# Patient Record
Sex: MALE | Race: WHITE | NOT HISPANIC OR LATINO | Employment: FULL TIME | URBAN - METROPOLITAN AREA
[De-identification: names, ages, dates, MRNs, and addresses within clinical notes are randomized per-mention and may not be internally consistent; named-entity substitution may affect disease eponyms.]

---

## 2023-08-19 ENCOUNTER — HOSPITAL ENCOUNTER (OUTPATIENT)
Dept: DATA CONVERSION | Facility: HOSPITAL | Age: 48
Discharge: HOME | End: 2023-08-19
Payer: COMMERCIAL

## 2023-08-19 DIAGNOSIS — I10 ESSENTIAL (PRIMARY) HYPERTENSION: ICD-10-CM

## 2023-08-19 LAB
ANION GAP SERPL CALCULATED.3IONS-SCNC: 10 MMOL/L (ref 0–19)
BUN SERPL-MCNC: 19 MG/DL (ref 8–25)
BUN/CREAT SERPL: 19 RATIO (ref 8–21)
CALCIUM SERPL-MCNC: 9.3 MG/DL (ref 8.5–10.4)
CHLORIDE SERPL-SCNC: 107 MMOL/L (ref 97–107)
CO2 SERPL-SCNC: 25 MMOL/L (ref 24–31)
CREAT SERPL-MCNC: 1 MG/DL (ref 0.4–1.6)
GFR SERPL CREATININE-BSD FRML MDRD: 93 ML/MIN/1.73 M2
GLUCOSE SERPL-MCNC: 137 MG/DL (ref 65–99)
POTASSIUM SERPL-SCNC: 5.7 MMOL/L (ref 3.4–5.1)
SODIUM SERPL-SCNC: 142 MMOL/L (ref 133–145)

## 2023-10-06 PROCEDURE — 83036 HEMOGLOBIN GLYCOSYLATED A1C: CPT

## 2023-10-06 PROCEDURE — 84132 ASSAY OF SERUM POTASSIUM: CPT

## 2023-10-11 ENCOUNTER — TELEPHONE (OUTPATIENT)
Dept: PRIMARY CARE | Facility: CLINIC | Age: 48
End: 2023-10-11
Payer: COMMERCIAL

## 2023-10-12 NOTE — TELEPHONE ENCOUNTER
Called and informed patient of message, he is aware and understands. He will call back to make CPE appt. In Feb.

## 2023-10-31 ENCOUNTER — PHARMACY VISIT (OUTPATIENT)
Dept: PHARMACY | Facility: CLINIC | Age: 48
End: 2023-10-31
Payer: COMMERCIAL

## 2023-11-02 ENCOUNTER — PHARMACY VISIT (OUTPATIENT)
Dept: PHARMACY | Facility: CLINIC | Age: 48
End: 2023-11-02
Payer: COMMERCIAL

## 2023-11-02 PROCEDURE — RXMED WILLOW AMBULATORY MEDICATION CHARGE

## 2023-11-08 ENCOUNTER — TELEPHONE (OUTPATIENT)
Dept: PRIMARY CARE | Facility: CLINIC | Age: 48
End: 2023-11-08
Payer: COMMERCIAL

## 2023-11-08 DIAGNOSIS — I10 HYPERTENSION, UNSPECIFIED TYPE: ICD-10-CM

## 2023-11-08 DIAGNOSIS — E78.00 PURE HYPERCHOLESTEROLEMIA: ICD-10-CM

## 2023-11-08 DIAGNOSIS — Z00.00 ROUTINE MEDICAL EXAM: ICD-10-CM

## 2023-12-06 ENCOUNTER — OFFICE VISIT (OUTPATIENT)
Dept: PRIMARY CARE | Facility: CLINIC | Age: 48
End: 2023-12-06
Payer: COMMERCIAL

## 2023-12-06 VITALS
TEMPERATURE: 98.5 F | WEIGHT: 187 LBS | OXYGEN SATURATION: 95 % | BODY MASS INDEX: 26.77 KG/M2 | HEIGHT: 70 IN | HEART RATE: 83 BPM | DIASTOLIC BLOOD PRESSURE: 72 MMHG | SYSTOLIC BLOOD PRESSURE: 112 MMHG

## 2023-12-06 DIAGNOSIS — F33.1 MODERATE EPISODE OF RECURRENT MAJOR DEPRESSIVE DISORDER (MULTI): ICD-10-CM

## 2023-12-06 DIAGNOSIS — F41.9 ANXIETY: ICD-10-CM

## 2023-12-06 DIAGNOSIS — I10 HYPERTENSION, UNSPECIFIED TYPE: ICD-10-CM

## 2023-12-06 DIAGNOSIS — Z12.11 COLON CANCER SCREENING: ICD-10-CM

## 2023-12-06 DIAGNOSIS — Z00.00 WELL ADULT EXAM: Primary | ICD-10-CM

## 2023-12-06 DIAGNOSIS — Z72.0 TOBACCO USE: ICD-10-CM

## 2023-12-06 PROBLEM — M54.12 CERVICAL RADICULOPATHY: Status: ACTIVE | Noted: 2023-12-06

## 2023-12-06 PROBLEM — F32.A DEPRESSION: Status: ACTIVE | Noted: 2022-09-12

## 2023-12-06 PROBLEM — R73.9 HYPERGLYCEMIA: Status: ACTIVE | Noted: 2023-12-06

## 2023-12-06 PROCEDURE — 99212 OFFICE O/P EST SF 10 MIN: CPT | Performed by: NURSE PRACTITIONER

## 2023-12-06 PROCEDURE — 1036F TOBACCO NON-USER: CPT | Performed by: NURSE PRACTITIONER

## 2023-12-06 PROCEDURE — 3074F SYST BP LT 130 MM HG: CPT | Performed by: NURSE PRACTITIONER

## 2023-12-06 PROCEDURE — 90686 IIV4 VACC NO PRSV 0.5 ML IM: CPT | Performed by: NURSE PRACTITIONER

## 2023-12-06 PROCEDURE — 3078F DIAST BP <80 MM HG: CPT | Performed by: NURSE PRACTITIONER

## 2023-12-06 PROCEDURE — 99396 PREV VISIT EST AGE 40-64: CPT | Performed by: NURSE PRACTITIONER

## 2023-12-06 PROCEDURE — 90471 IMMUNIZATION ADMIN: CPT | Performed by: NURSE PRACTITIONER

## 2023-12-06 PROCEDURE — 93000 ELECTROCARDIOGRAM COMPLETE: CPT | Performed by: NURSE PRACTITIONER

## 2023-12-06 RX ORDER — VENLAFAXINE HYDROCHLORIDE 37.5 MG/1
37.5 CAPSULE, EXTENDED RELEASE ORAL DAILY
Qty: 30 CAPSULE | Refills: 1 | Status: SHIPPED | OUTPATIENT
Start: 2023-12-06 | End: 2024-01-02 | Stop reason: DRUGHIGH

## 2023-12-06 RX ORDER — LORAZEPAM 0.5 MG/1
0.5 TABLET ORAL 2 TIMES DAILY PRN
Qty: 6 TABLET | Refills: 0 | Status: SHIPPED | OUTPATIENT
Start: 2023-12-06 | End: 2023-12-09

## 2023-12-06 ASSESSMENT — PATIENT HEALTH QUESTIONNAIRE - PHQ9
3. TROUBLE FALLING OR STAYING ASLEEP OR SLEEPING TOO MUCH: NEARLY EVERY DAY
SUM OF ALL RESPONSES TO PHQ QUESTIONS 1-9: 24
2. FEELING DOWN, DEPRESSED OR HOPELESS: NEARLY EVERY DAY
SUM OF ALL RESPONSES TO PHQ9 QUESTIONS 1 AND 2: 6
8. MOVING OR SPEAKING SO SLOWLY THAT OTHER PEOPLE COULD HAVE NOTICED. OR THE OPPOSITE, BEING SO FIGETY OR RESTLESS THAT YOU HAVE BEEN MOVING AROUND A LOT MORE THAN USUAL: NEARLY EVERY DAY
5. POOR APPETITE OR OVEREATING: NEARLY EVERY DAY
9. THOUGHTS THAT YOU WOULD BE BETTER OFF DEAD, OR OF HURTING YOURSELF: NOT AT ALL
6. FEELING BAD ABOUT YOURSELF - OR THAT YOU ARE A FAILURE OR HAVE LET YOURSELF OR YOUR FAMILY DOWN: NEARLY EVERY DAY
7. TROUBLE CONCENTRATING ON THINGS, SUCH AS READING THE NEWSPAPER OR WATCHING TELEVISION: NEARLY EVERY DAY
10. IF YOU CHECKED OFF ANY PROBLEMS, HOW DIFFICULT HAVE THESE PROBLEMS MADE IT FOR YOU TO DO YOUR WORK, TAKE CARE OF THINGS AT HOME, OR GET ALONG WITH OTHER PEOPLE: EXTREMELY DIFFICULT
1. LITTLE INTEREST OR PLEASURE IN DOING THINGS: NEARLY EVERY DAY
4. FEELING TIRED OR HAVING LITTLE ENERGY: NEARLY EVERY DAY

## 2023-12-06 ASSESSMENT — PAIN SCALES - GENERAL: PAINLEVEL: 0-NO PAIN

## 2023-12-06 NOTE — PATIENT INSTRUCTIONS
Thank you for choosing our office for your healthcare needs    Please work towards healthy lifestyle including [employer] diet and regular exercise  Smoking cessation encouraged    Restart venlafaxine 37.5mg daily  Plan to follow-up in the office in 6 to 8 weeks

## 2023-12-06 NOTE — PROGRESS NOTES
"Subjective   Patient ID: Roman Ellis is a 48 y.o. male who presents for Annual Exam (Concerns about anxiety medication).    HPI   Diet \"crappy\" past few weeks  Exercise - has [employer] membership  + smoking - varies 1/2 - 1ppd     Colonoscopy - never  No colon cancer family history    Hx  of htn  Tolerating medications   Does not check blood pressure at home    Pt quit venlafaxine - stopped when rx ran out 5 month ago. He is feeling depressed.  Pt and wife state he is not himself. Pt is crabby, irritable, anhedonia. No SI/HI.   Pt was on something previously that had sexual side effects. He is interested in restarting     No predraw blood work    Review of Systems  Constitutional Symptoms: negative for fever, loss of appetite, headaches, fatigue.   Eyes: negative for loss and blurring of vision, double vision.   Ear, Nose, Mouth, Throat: negative for hearing loss, tinnitus, nasal congestion, rhinorrhea, nose bleeds, teeth problems, mouth sores, gum disease, dysphagia, sore throat.   Cardiovascular: negative for chest pain/pressure, palpitations, edema, claudication.   Respiratory: negative for shortness of breath, dyspnea on exertion, pain with breathing, coughing.   Breast: negative for tenderness, masses, gynecomastia.   Gastrointestinal: negative for anorexia, indigestion, nausea, vomiting, abdominal pain, change in bowel habits, diarrhea, constipation, hematochaezia, melena, blood in stool.    : Negative for urinary or penile complaints  Musculoskeletal: negative for joint pain, joint swelling, myalgias, cramps.   Integumentary: negative for change in mole, skin trouble or rash.   Neurological: negative for headache, numbness, tingling, weakness, tremors.   Psychiatric: Positive for depression, anxiety. Irritability, Anhedonia.  Negative for SI/HI  Endocrine: negative for weight gain, heat or cold intolerance, polyuria, polydipsia, polyphagia.   Hematologic/Lymphatic: negative for bruising, abnormal bleeding, " "swollen glands.      Objective   /72 (BP Location: Left arm)   Pulse 83   Temp 36.9 °C (98.5 °F) (Temporal)   Ht 1.778 m (5' 10\")   Wt 84.8 kg (187 lb)   SpO2 95%   BMI 26.83 kg/m²     Physical Exam  General Appearance: Vital Signs have been reviewed. Comfortable. Well nourished, and well developed. He is awake, alert, and oriented and appears his stated age. The patient is cooperative with exam.  Head: Hair pattern reveals a normal pattern for patient's age and The face shows no abnormalities.  Eyes: PERRLA, EOMI, conjunctiva and sclera clear. Extraocular muscle exam reveals EOMI.  Ears, Nose, Mouth, and Throat: EARS: External bilateral ears reveals normal helix, tragus and ear lobe. Bilateral canals are normal. Both tympanic membranes are pearly gray and landmarks normal.   NOSE: Nasal mucosa in both nostrils reveals no polyps, ulcerations, or lesions. Teeth reveal good repair. Posterior pharynx reveals no abnormalities.  Neck: Neck reveals supple, no adenopathy, no thyromegaly, or carotid bruits.  Chest: Lungs are clear to auscultation bilaterally with no wheezes, rales, or rhonchi.  Cardiovascular: RRR without MRG. Bilateral DP pulses are 2+. Extremities reveal no cyanosis, clubbing, or edema.  Abdomen: Abdomen is soft, NT, ND with no masses.   Genitourinary: Deferred.  No complaints  Musculoskeletal: 5/5 and equal strength in bilateral upper and lower extremities.  Skin: Skin reveals good turgor and no rashes.  Neurological: Neuro: Intact and non-focal.  Psychiatric: Patient has appropriate judgement. Patient has good insight. Mood and affect depressed    Assessment/Plan   Diagnoses and all orders for this visit:  Well adult exam  -     ECG 12 lead (Clinic Performed)  Continue current medications  Healthy diet and exercise  Mediterranean diet  Safety  Plan 6-month interval visit  Hypertension, unspecified type  -     ECG 12 lead (Clinic Performed)  See above  Anxiety  -     venlafaxine XR " (Effexor-XR) 37.5 mg 24 hr capsule; Take 1 capsule (37.5 mg) by mouth once daily. Do not crush or chew.  Restart venlafaxine XR as prescribed  Relaxation/mindfulness  Calm brittany  Consider therapy  If not noting improvement in about 3 weeks or so, we can increase medicine to 75 mg and have follow-up  Plan to follow-up in 6 to 8 weeks    Oarrs reviewed  3 day supply ativan given to use while restarting venlafaxine. Understands if he needs more, he will need to see psych or have further evaluation.   Moderate episode of recurrent major depressive disorder (CMS/HCC)  -     venlafaxine XR (Effexor-XR) 37.5 mg 24 hr capsule; Take 1 capsule (37.5 mg) by mouth once daily. Do not crush or chew.  See anxiety tab above  Colon cancer screening  -     Cologuard® colon cancer screening; Future  Refuses colonoscopy.  Agrees to Cologuard  Tobacco use  Smoking cessation encouraged.  Not ready to quit  Other orders  -     Flu vaccine (IIV4) age 6 months and greater, preservative free  Indications, benefits and risks/side effects discussed  -     Follow Up In Primary Care - Established; Future

## 2023-12-29 LAB — NONINV COLON CA DNA+OCC BLD SCRN STL QL: NEGATIVE

## 2024-01-02 ENCOUNTER — TELEPHONE (OUTPATIENT)
Dept: PRIMARY CARE | Facility: CLINIC | Age: 49
End: 2024-01-02
Payer: COMMERCIAL

## 2024-01-02 DIAGNOSIS — F33.1 MODERATE EPISODE OF RECURRENT MAJOR DEPRESSIVE DISORDER (MULTI): ICD-10-CM

## 2024-01-02 DIAGNOSIS — F41.9 ANXIETY: Primary | ICD-10-CM

## 2024-01-02 DIAGNOSIS — F41.9 ANXIETY: ICD-10-CM

## 2024-01-02 PROCEDURE — RXMED WILLOW AMBULATORY MEDICATION CHARGE

## 2024-01-02 RX ORDER — VENLAFAXINE HYDROCHLORIDE 75 MG/1
75 CAPSULE, EXTENDED RELEASE ORAL DAILY
Qty: 90 CAPSULE | Refills: 1 | Status: SHIPPED | OUTPATIENT
Start: 2024-01-02 | End: 2024-04-09 | Stop reason: SDUPTHER

## 2024-01-02 RX ORDER — VENLAFAXINE HYDROCHLORIDE 75 MG/1
75 CAPSULE, EXTENDED RELEASE ORAL DAILY
Qty: 30 CAPSULE | Refills: 5 | Status: SHIPPED | OUTPATIENT
Start: 2024-01-02 | End: 2024-01-02 | Stop reason: SDUPTHER

## 2024-01-02 NOTE — TELEPHONE ENCOUNTER
Called and informed wife of this, she is aware. She is requesting 90 days to be sent to [facility]. Pended for K[employer] to send.

## 2024-01-04 ENCOUNTER — PHARMACY VISIT (OUTPATIENT)
Dept: PHARMACY | Facility: CLINIC | Age: 49
End: 2024-01-04
Payer: COMMERCIAL

## 2024-02-07 PROCEDURE — RXMED WILLOW AMBULATORY MEDICATION CHARGE

## 2024-02-08 ENCOUNTER — PHARMACY VISIT (OUTPATIENT)
Dept: PHARMACY | Facility: CLINIC | Age: 49
End: 2024-02-08
Payer: COMMERCIAL

## 2024-03-27 ENCOUNTER — PHARMACY VISIT (OUTPATIENT)
Dept: PHARMACY | Facility: CLINIC | Age: 49
End: 2024-03-27
Payer: COMMERCIAL

## 2024-03-27 PROCEDURE — RXMED WILLOW AMBULATORY MEDICATION CHARGE

## 2024-04-09 ENCOUNTER — PHARMACY VISIT (OUTPATIENT)
Dept: PHARMACY | Facility: CLINIC | Age: 49
End: 2024-04-09
Payer: COMMERCIAL

## 2024-04-09 ENCOUNTER — TELEPHONE (OUTPATIENT)
Dept: PRIMARY CARE | Facility: CLINIC | Age: 49
End: 2024-04-09
Payer: COMMERCIAL

## 2024-04-09 DIAGNOSIS — F33.1 MODERATE EPISODE OF RECURRENT MAJOR DEPRESSIVE DISORDER (MULTI): ICD-10-CM

## 2024-04-09 DIAGNOSIS — F41.9 ANXIETY: ICD-10-CM

## 2024-04-09 PROCEDURE — RXMED WILLOW AMBULATORY MEDICATION CHARGE

## 2024-04-09 RX ORDER — VENLAFAXINE HYDROCHLORIDE 75 MG/1
CAPSULE, EXTENDED RELEASE ORAL
Qty: 30 CAPSULE | Refills: 0 | OUTPATIENT
Start: 2024-04-09

## 2024-04-09 RX ORDER — VENLAFAXINE HYDROCHLORIDE 75 MG/1
75 CAPSULE, EXTENDED RELEASE ORAL DAILY
Qty: 30 CAPSULE | Refills: 0 | Status: SHIPPED | OUTPATIENT
Start: 2024-04-09 | End: 2024-05-09

## 2024-04-09 NOTE — TELEPHONE ENCOUNTER
RX REQUEST  [facility]  # 30  [facility]     PT NEEDS THIS CALLED TO LOCAL PHARM JUST TILL THE MAIL ORDER COMES

## 2024-05-06 DIAGNOSIS — I10 HYPERTENSION, UNSPECIFIED TYPE: ICD-10-CM

## 2024-05-06 PROCEDURE — RXMED WILLOW AMBULATORY MEDICATION CHARGE

## 2024-05-06 RX ORDER — LISINOPRIL 10 MG/1
10 TABLET ORAL DAILY
Qty: 90 TABLET | Refills: 1 | Status: SHIPPED | OUTPATIENT
Start: 2024-05-06 | End: 2025-05-06

## 2024-05-07 ENCOUNTER — PHARMACY VISIT (OUTPATIENT)
Dept: PHARMACY | Facility: CLINIC | Age: 49
End: 2024-05-07
Payer: COMMERCIAL

## 2024-06-18 PROCEDURE — RXMED WILLOW AMBULATORY MEDICATION CHARGE

## 2024-06-20 ENCOUNTER — PHARMACY VISIT (OUTPATIENT)
Dept: PHARMACY | Facility: CLINIC | Age: 49
End: 2024-06-20
Payer: COMMERCIAL

## 2024-07-17 ENCOUNTER — PHARMACY VISIT (OUTPATIENT)
Dept: PHARMACY | Facility: CLINIC | Age: 49
End: 2024-07-17
Payer: COMMERCIAL

## 2024-07-17 PROCEDURE — RXMED WILLOW AMBULATORY MEDICATION CHARGE

## 2024-08-02 PROCEDURE — RXMED WILLOW AMBULATORY MEDICATION CHARGE

## 2024-08-05 ENCOUNTER — PHARMACY VISIT (OUTPATIENT)
Dept: PHARMACY | Facility: CLINIC | Age: 49
End: 2024-08-05
Payer: COMMERCIAL

## 2024-08-09 DIAGNOSIS — F41.9 ANXIETY: ICD-10-CM

## 2024-08-09 PROCEDURE — RXMED WILLOW AMBULATORY MEDICATION CHARGE

## 2024-08-09 RX ORDER — VENLAFAXINE HYDROCHLORIDE 75 MG/1
CAPSULE, EXTENDED RELEASE ORAL
Qty: 30 CAPSULE | Refills: 5 | Status: SHIPPED | OUTPATIENT
Start: 2024-08-09

## 2024-08-10 ENCOUNTER — PHARMACY VISIT (OUTPATIENT)
Dept: PHARMACY | Facility: CLINIC | Age: 49
End: 2024-08-10
Payer: COMMERCIAL

## 2024-09-16 DIAGNOSIS — I10 HYPERTENSION, UNSPECIFIED TYPE: Primary | ICD-10-CM

## 2024-09-16 PROCEDURE — RXMED WILLOW AMBULATORY MEDICATION CHARGE

## 2024-09-16 RX ORDER — AMLODIPINE BESYLATE 10 MG/1
10 TABLET ORAL DAILY
Qty: 90 TABLET | Refills: 0 | Status: SHIPPED | OUTPATIENT
Start: 2024-09-16 | End: 2024-12-18

## 2024-09-17 DIAGNOSIS — I10 HYPERTENSION, UNSPECIFIED TYPE: ICD-10-CM

## 2024-09-17 PROCEDURE — RXMED WILLOW AMBULATORY MEDICATION CHARGE

## 2024-09-17 RX ORDER — LISINOPRIL 10 MG/1
10 TABLET ORAL DAILY
Qty: 90 TABLET | Refills: 0 | Status: SHIPPED | OUTPATIENT
Start: 2024-09-17 | End: 2024-12-16

## 2024-09-19 ENCOUNTER — PHARMACY VISIT (OUTPATIENT)
Dept: PHARMACY | Facility: CLINIC | Age: 49
End: 2024-09-19
Payer: COMMERCIAL

## 2024-10-04 PROCEDURE — RXMED WILLOW AMBULATORY MEDICATION CHARGE

## 2024-10-05 ENCOUNTER — PHARMACY VISIT (OUTPATIENT)
Dept: PHARMACY | Facility: CLINIC | Age: 49
End: 2024-10-05
Payer: COMMERCIAL

## 2024-10-14 ENCOUNTER — TELEPHONE (OUTPATIENT)
Dept: PRIMARY CARE | Facility: CLINIC | Age: 49
End: 2024-10-14
Payer: COMMERCIAL

## 2024-10-14 DIAGNOSIS — R73.9 HYPERGLYCEMIA: ICD-10-CM

## 2024-10-14 DIAGNOSIS — Z00.00 WELL ADULT EXAM: ICD-10-CM

## 2024-10-14 DIAGNOSIS — I10 HYPERTENSION, UNSPECIFIED TYPE: ICD-10-CM

## 2024-10-14 DIAGNOSIS — E78.00 PURE HYPERCHOLESTEROLEMIA: ICD-10-CM

## 2024-12-11 DIAGNOSIS — F41.9 ANXIETY: ICD-10-CM

## 2024-12-11 PROCEDURE — RXMED WILLOW AMBULATORY MEDICATION CHARGE

## 2024-12-11 RX ORDER — VENLAFAXINE HYDROCHLORIDE 75 MG/1
CAPSULE, EXTENDED RELEASE ORAL
Qty: 30 CAPSULE | Refills: 5 | Status: SHIPPED | OUTPATIENT
Start: 2024-12-11

## 2024-12-13 ENCOUNTER — PHARMACY VISIT (OUTPATIENT)
Dept: PHARMACY | Facility: CLINIC | Age: 49
End: 2024-12-13
Payer: COMMERCIAL

## 2024-12-14 ENCOUNTER — LAB (OUTPATIENT)
Dept: LAB | Facility: LAB | Age: 49
End: 2024-12-14
Payer: COMMERCIAL

## 2024-12-14 DIAGNOSIS — Z00.00 WELL ADULT EXAM: ICD-10-CM

## 2024-12-14 DIAGNOSIS — E78.00 PURE HYPERCHOLESTEROLEMIA: ICD-10-CM

## 2024-12-14 DIAGNOSIS — I10 HYPERTENSION, UNSPECIFIED TYPE: ICD-10-CM

## 2024-12-14 DIAGNOSIS — R73.9 HYPERGLYCEMIA: ICD-10-CM

## 2024-12-14 LAB
ALBUMIN SERPL BCP-MCNC: 4.2 G/DL (ref 3.4–5)
ALP SERPL-CCNC: 38 U/L (ref 33–120)
ALT SERPL W P-5'-P-CCNC: 28 U/L (ref 10–52)
ANION GAP SERPL CALC-SCNC: 17 MMOL/L (ref 10–20)
AST SERPL W P-5'-P-CCNC: 19 U/L (ref 9–39)
BASOPHILS # BLD AUTO: 0.03 X10*3/UL (ref 0–0.1)
BASOPHILS NFR BLD AUTO: 0.3 %
BILIRUB SERPL-MCNC: 0.6 MG/DL (ref 0–1.2)
BUN SERPL-MCNC: 10 MG/DL (ref 6–23)
CALCIUM SERPL-MCNC: 8.8 MG/DL (ref 8.6–10.3)
CHLORIDE SERPL-SCNC: 98 MMOL/L (ref 98–107)
CHOLEST SERPL-MCNC: 162 MG/DL (ref 0–199)
CHOLESTEROL/HDL RATIO: 4
CO2 SERPL-SCNC: 29 MMOL/L (ref 21–32)
CREAT SERPL-MCNC: 0.9 MG/DL (ref 0.5–1.3)
CREAT UR-MCNC: 93.1 MG/DL (ref 20–370)
EGFRCR SERPLBLD CKD-EPI 2021: >90 ML/MIN/1.73M*2
EOSINOPHIL # BLD AUTO: 0.16 X10*3/UL (ref 0–0.7)
EOSINOPHIL NFR BLD AUTO: 1.5 %
ERYTHROCYTE [DISTWIDTH] IN BLOOD BY AUTOMATED COUNT: 12.6 % (ref 11.5–14.5)
EST. AVERAGE GLUCOSE BLD GHB EST-MCNC: 134 MG/DL
GLUCOSE SERPL-MCNC: 110 MG/DL (ref 74–99)
HBA1C MFR BLD: 6.3 %
HCT VFR BLD AUTO: 45.7 % (ref 41–52)
HDLC SERPL-MCNC: 41 MG/DL
HGB BLD-MCNC: 15.5 G/DL (ref 13.5–17.5)
IMM GRANULOCYTES # BLD AUTO: 0.04 X10*3/UL (ref 0–0.7)
IMM GRANULOCYTES NFR BLD AUTO: 0.4 % (ref 0–0.9)
LDLC SERPL CALC-MCNC: 105 MG/DL
LYMPHOCYTES # BLD AUTO: 1.58 X10*3/UL (ref 1.2–4.8)
LYMPHOCYTES NFR BLD AUTO: 14.8 %
MCH RBC QN AUTO: 30.4 PG (ref 26–34)
MCHC RBC AUTO-ENTMCNC: 33.9 G/DL (ref 32–36)
MCV RBC AUTO: 90 FL (ref 80–100)
MICROALBUMIN UR-MCNC: <7 MG/L
MICROALBUMIN/CREAT UR: NORMAL MG/G{CREAT}
MONOCYTES # BLD AUTO: 0.78 X10*3/UL (ref 0.1–1)
MONOCYTES NFR BLD AUTO: 7.3 %
NEUTROPHILS # BLD AUTO: 8.05 X10*3/UL (ref 1.2–7.7)
NEUTROPHILS NFR BLD AUTO: 75.7 %
NON HDL CHOLESTEROL: 121 MG/DL (ref 0–149)
NRBC BLD-RTO: 0 /100 WBCS (ref 0–0)
PLATELET # BLD AUTO: 251 X10*3/UL (ref 150–450)
POTASSIUM SERPL-SCNC: 4.8 MMOL/L (ref 3.5–5.3)
PROT SERPL-MCNC: 6.5 G/DL (ref 6.4–8.2)
RBC # BLD AUTO: 5.1 X10*6/UL (ref 4.5–5.9)
SODIUM SERPL-SCNC: 139 MMOL/L (ref 136–145)
TRIGL SERPL-MCNC: 81 MG/DL (ref 0–149)
VLDL: 16 MG/DL (ref 0–40)
WBC # BLD AUTO: 10.6 X10*3/UL (ref 4.4–11.3)

## 2024-12-14 PROCEDURE — 36415 COLL VENOUS BLD VENIPUNCTURE: CPT

## 2024-12-14 PROCEDURE — 80053 COMPREHEN METABOLIC PANEL: CPT

## 2024-12-14 PROCEDURE — 82043 UR ALBUMIN QUANTITATIVE: CPT

## 2024-12-14 PROCEDURE — 85025 COMPLETE CBC W/AUTO DIFF WBC: CPT

## 2024-12-14 PROCEDURE — 80061 LIPID PANEL: CPT

## 2024-12-14 PROCEDURE — 83036 HEMOGLOBIN GLYCOSYLATED A1C: CPT

## 2024-12-14 PROCEDURE — 82570 ASSAY OF URINE CREATININE: CPT

## 2024-12-16 DIAGNOSIS — I10 HYPERTENSION, UNSPECIFIED TYPE: ICD-10-CM

## 2024-12-16 PROCEDURE — RXMED WILLOW AMBULATORY MEDICATION CHARGE

## 2024-12-16 RX ORDER — AMLODIPINE BESYLATE 10 MG/1
10 TABLET ORAL DAILY
Qty: 90 TABLET | Refills: 1 | Status: SHIPPED | OUTPATIENT
Start: 2024-12-16 | End: 2025-06-14

## 2024-12-17 ENCOUNTER — PHARMACY VISIT (OUTPATIENT)
Dept: PHARMACY | Facility: CLINIC | Age: 49
End: 2024-12-17
Payer: COMMERCIAL

## 2024-12-18 ENCOUNTER — LAB (OUTPATIENT)
Dept: LAB | Facility: LAB | Age: 49
End: 2024-12-18
Payer: COMMERCIAL

## 2024-12-18 ENCOUNTER — APPOINTMENT (OUTPATIENT)
Dept: PRIMARY CARE | Facility: CLINIC | Age: 49
End: 2024-12-18
Payer: COMMERCIAL

## 2024-12-18 VITALS
TEMPERATURE: 98 F | BODY MASS INDEX: 26.77 KG/M2 | WEIGHT: 187 LBS | HEIGHT: 70 IN | SYSTOLIC BLOOD PRESSURE: 132 MMHG | OXYGEN SATURATION: 97 % | DIASTOLIC BLOOD PRESSURE: 84 MMHG | HEART RATE: 78 BPM

## 2024-12-18 DIAGNOSIS — R35.0 URINARY FREQUENCY: ICD-10-CM

## 2024-12-18 DIAGNOSIS — F33.1 MODERATE EPISODE OF RECURRENT MAJOR DEPRESSIVE DISORDER: ICD-10-CM

## 2024-12-18 DIAGNOSIS — R73.01 IFG (IMPAIRED FASTING GLUCOSE): ICD-10-CM

## 2024-12-18 DIAGNOSIS — I10 PRIMARY HYPERTENSION: ICD-10-CM

## 2024-12-18 DIAGNOSIS — Z00.01 ENCOUNTER FOR WELL ADULT EXAM WITH ABNORMAL FINDINGS: Primary | ICD-10-CM

## 2024-12-18 DIAGNOSIS — E78.5 DYSLIPIDEMIA: ICD-10-CM

## 2024-12-18 LAB
APPEARANCE UR: CLEAR
BILIRUB UR STRIP.AUTO-MCNC: NEGATIVE MG/DL
COLOR UR: COLORLESS
GLUCOSE UR STRIP.AUTO-MCNC: NORMAL MG/DL
KETONES UR STRIP.AUTO-MCNC: NEGATIVE MG/DL
LEUKOCYTE ESTERASE UR QL STRIP.AUTO: NEGATIVE
NITRITE UR QL STRIP.AUTO: NEGATIVE
PH UR STRIP.AUTO: 6.5 [PH]
PROT UR STRIP.AUTO-MCNC: NEGATIVE MG/DL
RBC # UR STRIP.AUTO: NEGATIVE /UL
SP GR UR STRIP.AUTO: 1
UROBILINOGEN UR STRIP.AUTO-MCNC: NORMAL MG/DL

## 2024-12-18 PROCEDURE — 3079F DIAST BP 80-89 MM HG: CPT | Performed by: NURSE PRACTITIONER

## 2024-12-18 PROCEDURE — 1036F TOBACCO NON-USER: CPT | Performed by: NURSE PRACTITIONER

## 2024-12-18 PROCEDURE — 81003 URINALYSIS AUTO W/O SCOPE: CPT

## 2024-12-18 PROCEDURE — 3008F BODY MASS INDEX DOCD: CPT | Performed by: NURSE PRACTITIONER

## 2024-12-18 PROCEDURE — 99214 OFFICE O/P EST MOD 30 MIN: CPT | Performed by: NURSE PRACTITIONER

## 2024-12-18 PROCEDURE — 99396 PREV VISIT EST AGE 40-64: CPT | Performed by: NURSE PRACTITIONER

## 2024-12-18 PROCEDURE — 3075F SYST BP GE 130 - 139MM HG: CPT | Performed by: NURSE PRACTITIONER

## 2024-12-18 ASSESSMENT — PATIENT HEALTH QUESTIONNAIRE - PHQ9
SUM OF ALL RESPONSES TO PHQ9 QUESTIONS 1 AND 2: 0
1. LITTLE INTEREST OR PLEASURE IN DOING THINGS: NOT AT ALL
2. FEELING DOWN, DEPRESSED OR HOPELESS: NOT AT ALL

## 2024-12-18 ASSESSMENT — PAIN SCALES - GENERAL: PAINLEVEL_OUTOF10: 0-NO PAIN

## 2024-12-18 NOTE — PROGRESS NOTES
Subjective   Patient ID: Roman Ellis is a 49 y.o. male who presents for Annual Exam.    HPI   Diet is improving, protein/veggies  Exercise - lacking  + smoking - varies 1/2 - 1ppd. Working on quitting     Immunizations reviewed     Colonoscopy - never  Cologuard Negative 2023  No colon cancer family history     Hx  of htn  Tolerating medications   Does not check blood pressure at home     History of depression.  Presently on venlafaxine   He is doing well.  He is less crabby, irritable, anhedonia. No SI/HI.   He did try a different antidepressant in the past which had sexual side effects, likely SSRI     Review of Systems  Constitutional Symptoms: negative for fever, loss of appetite, headaches, fatigue.   Eyes: negative for loss and blurring of vision, double vision.   Ear, Nose, Mouth, Throat: negative for hearing loss, tinnitus, nasal congestion, rhinorrhea, nose bleeds, teeth problems, mouth sores, gum disease, dysphagia, sore throat.   Cardiovascular: negative for chest pain/pressure, palpitations, edema, claudication.   Respiratory: negative for shortness of breath, dyspnea on exertion, pain with breathing, coughing.   Breast: negative for tenderness, masses, gynecomastia.   Gastrointestinal: negative for anorexia, indigestion, nausea, vomiting, abdominal pain, change in bowel habits, diarrhea, constipation, hematochezia, melena, blood in stool.    : Negative for urinary or genital complaints positive for urinary frequency  Musculoskeletal: negative for joint pain, joint swelling, myalgia, cramps.   Integumentary: negative for change in mole, skin trouble or rash.   Neurological: negative for headache, numbness, tingling, weakness, tremors.   Psychiatric: negative for depression, anxiety.   Endocrine: negative for weight gain, heat or cold intolerance, polyuria, polydipsia, polyphagia.   Hematologic/Lymphatic: negative for bruising, abnormal bleeding, swollen glands     Objective   /84   Pulse 78    "Temp 36.7 °C (98 °F)   Ht 1.778 m (5' 10\")   Wt 84.8 kg (187 lb)   SpO2 97%   BMI 26.83 kg/m²     Physical Exam  General Appearance: Vital Signs have been reviewed. Comfortable. Well nourished, and well developed. He is awake, alert, and oriented and appears his stated age. The patient is cooperative with exam.  Head: Hair pattern reveals a normal pattern for patient's age and The face shows no abnormalities.  Eyes: PERRLA, EOMI, conjunctiva and sclera clear. Extraocular muscle exam reveals EOMI.  Ears, Nose, Mouth, and Throat: EARS: External bilateral ears reveals normal helix, tragus and ear lobe. Bilateral canals are normal. Both tympanic membranes are pearly gray and landmarks normal.   NOSE: Nasal mucosa in both nostrils reveals no polyps, ulcerations, or lesions. Teeth reveal good repair. Posterior pharynx reveals no abnormalities.  Neck: Neck reveals supple, no adenopathy, no thyromegaly, or carotid bruits.  Chest: Lungs are clear to auscultation bilaterally with no wheezes, rales, or rhonchi.  Cardiovascular: RRR without MRG. Bilateral DP pulses are 2+. Extremities reveal no cyanosis, clubbing, or edema.  Abdomen: Abdomen with normoactive bowel sounds x4 quads, Abd is soft, NT, ND with no masses.   Genitourinary: defers  Musculoskeletal: 5/5 and equal strength in bilateral upper and lower extremities.  Skin: Skin reveals good turgor and no rashes.  Neurological:  Intact and non-focal.  Psychiatric: Patient has appropriate judgement. Patient has good insight. Patient's mood is appropriate.      Assessment/Plan   Diagnoses and all orders for this visit:  Encounter for well adult exam with abnormal findings  49-year-old male well exam  Preventative screenings reviewed  Immunizations reviewed  Mediterranean diet, exercise, safety  Smoking cessation encouraged  Follow up 6 months    Urinary frequency  -     Urinalysis with Reflex Microscopic; Future  Complains of urinary frequency  Admits to drinking 1 pot of " coffee per day, is a smoker  Encouraged to limit caffeine and quit smoking    Primary hypertension  BP stable on lisinopril 10 mg, amlodipine 10 mg  Continue current medications  Mediterranean diet/JAMAL diet  Exercise  Safety  Monitor bp and record  Follow up 6 months      IFG (impaired fasting glucose)  Glucose 110 with A1c 6.3  Healthy diet, exercise  Recheck 6 months    Dyslipidemia  The 10-year ASCVD risk score (Laurie JACK, et al., 2019) is: 3.5%    Values used to calculate the score:      Age: 49 years      Sex: Male      Is Non- : No      Diabetic: No      Tobacco smoker: No      Systolic Blood Pressure: 132 mmHg      Is BP treated: Yes      HDL Cholesterol: 41 mg/dL      Total Cholesterol: 162 mg/dL  Mediterranean diet, exercise, smoking cessation  Follow-up 1 year    Moderate episode of recurrent major depressive disorder  Stable on Effexor 75 mg daily  Relaxation, mindfulness, calm brittany  Follow-up 6 months    Other orders  -     Follow Up In Primary Care - Established; Future

## 2025-01-27 DIAGNOSIS — I10 HYPERTENSION, UNSPECIFIED TYPE: ICD-10-CM

## 2025-01-27 RX ORDER — LISINOPRIL 10 MG/1
10 TABLET ORAL DAILY
Qty: 90 TABLET | Refills: 0 | Status: SHIPPED | OUTPATIENT
Start: 2025-01-27 | End: 2025-04-30

## 2025-01-28 PROCEDURE — RXMED WILLOW AMBULATORY MEDICATION CHARGE

## 2025-01-30 ENCOUNTER — PHARMACY VISIT (OUTPATIENT)
Dept: PHARMACY | Facility: CLINIC | Age: 50
End: 2025-01-30
Payer: COMMERCIAL

## 2025-02-12 ENCOUNTER — TELEPHONE (OUTPATIENT)
Dept: PRIMARY CARE | Facility: CLINIC | Age: 50
End: 2025-02-12

## 2025-02-12 ENCOUNTER — OFFICE VISIT (OUTPATIENT)
Dept: PRIMARY CARE | Facility: CLINIC | Age: 50
End: 2025-02-12
Payer: COMMERCIAL

## 2025-02-12 ENCOUNTER — HOSPITAL ENCOUNTER (OUTPATIENT)
Dept: RADIOLOGY | Facility: CLINIC | Age: 50
Discharge: HOME | End: 2025-02-12
Payer: COMMERCIAL

## 2025-02-12 VITALS
HEART RATE: 80 BPM | HEIGHT: 70 IN | DIASTOLIC BLOOD PRESSURE: 80 MMHG | WEIGHT: 180 LBS | SYSTOLIC BLOOD PRESSURE: 128 MMHG | BODY MASS INDEX: 25.77 KG/M2 | OXYGEN SATURATION: 98 %

## 2025-02-12 DIAGNOSIS — S49.91XA INJURY OF RIGHT SHOULDER, INITIAL ENCOUNTER: Primary | ICD-10-CM

## 2025-02-12 PROBLEM — R50.9 FEVER: Status: RESOLVED | Noted: 2025-02-12 | Resolved: 2025-02-12

## 2025-02-12 PROBLEM — Z90.49 HISTORY OF LAPAROSCOPIC APPENDECTOMY: Status: RESOLVED | Noted: 2025-02-12 | Resolved: 2025-02-12

## 2025-02-12 PROCEDURE — 3008F BODY MASS INDEX DOCD: CPT | Performed by: STUDENT IN AN ORGANIZED HEALTH CARE EDUCATION/TRAINING PROGRAM

## 2025-02-12 PROCEDURE — 1036F TOBACCO NON-USER: CPT | Performed by: STUDENT IN AN ORGANIZED HEALTH CARE EDUCATION/TRAINING PROGRAM

## 2025-02-12 PROCEDURE — 73030 X-RAY EXAM OF SHOULDER: CPT | Mod: RT

## 2025-02-12 PROCEDURE — 3079F DIAST BP 80-89 MM HG: CPT | Performed by: STUDENT IN AN ORGANIZED HEALTH CARE EDUCATION/TRAINING PROGRAM

## 2025-02-12 PROCEDURE — 99213 OFFICE O/P EST LOW 20 MIN: CPT | Performed by: STUDENT IN AN ORGANIZED HEALTH CARE EDUCATION/TRAINING PROGRAM

## 2025-02-12 PROCEDURE — 3074F SYST BP LT 130 MM HG: CPT | Performed by: STUDENT IN AN ORGANIZED HEALTH CARE EDUCATION/TRAINING PROGRAM

## 2025-02-12 RX ORDER — ACETAMINOPHEN 500 MG
500 TABLET ORAL EVERY 6 HOURS PRN
COMMUNITY

## 2025-02-12 RX ORDER — CYCLOBENZAPRINE HCL 10 MG
10 TABLET ORAL 3 TIMES DAILY PRN
Qty: 30 TABLET | Refills: 0 | Status: SHIPPED | OUTPATIENT
Start: 2025-02-12 | End: 2025-04-13

## 2025-02-12 RX ORDER — HYDROCODONE BITARTRATE AND ACETAMINOPHEN 5; 325 MG/1; MG/1
1 TABLET ORAL EVERY 6 HOURS PRN
Qty: 6 TABLET | Refills: 0 | Status: SHIPPED | OUTPATIENT
Start: 2025-02-12 | End: 2025-02-19

## 2025-02-12 ASSESSMENT — ENCOUNTER SYMPTOMS
SLEEP DISTURBANCE: 0
LIGHT-HEADEDNESS: 0
FEVER: 0
SHORTNESS OF BREATH: 0
POLYDIPSIA: 0
CHILLS: 0
VOMITING: 0
DIARRHEA: 0
COUGH: 0
PALPITATIONS: 0
NAUSEA: 0
DYSURIA: 0
HEADACHES: 0
RHINORRHEA: 0
DYSPHORIC MOOD: 0
MYALGIAS: 1
ARTHRALGIAS: 1
WHEEZING: 0
SINUS PAIN: 0
NERVOUS/ANXIOUS: 0
CONSTIPATION: 0
WOUND: 0
FATIGUE: 0
FREQUENCY: 0
DIZZINESS: 0
SINUS PRESSURE: 0

## 2025-02-12 ASSESSMENT — PAIN SCALES - GENERAL: PAINLEVEL_OUTOF10: 6

## 2025-02-12 NOTE — TELEPHONE ENCOUNTER
Pt wife Ron called [phone]  he was seen today had a xray and scheduled a US but cannot be seen until 3/3 was the first available at any location he cannot wait that long as the pain is very bad, he was also given Hydrocodone 6 pills, can he get a MRI instead ?

## 2025-02-12 NOTE — TELEPHONE ENCOUNTER
I'll have to get the x-ray back before ordering an MRI.  But if the x-ray doesn't show the problem, MRI will be appropriate.     The can also go to the same day ortho clinic attached to [facility] if they want to get things done more efficiently.  The folks there can often get an MRI much faster than I can.     [facility]   [facility] [place]  [facility]  [address]  [address], [address]  [place] [id number] [phone]

## 2025-02-12 NOTE — PROGRESS NOTES
"Subjective   Patient ID: Roman Ellis is a 49 y.o. male who presents for Shoulder Pain (Not sure what happed. Did slip on ice on Sunday no fall,  hard to raise, rotate or turn arm. ).    Here today with right shoulder pain.  Slipped on ice on 2/9/25.  Jerked arms forward to regain balance.  Did not fall, but having ongoing right shoulder pain since then.  Pain with range of motion.  Has had weakness of flexion and abduction.     Shoulder Pain   Pertinent negatives include no fever.       Review of Systems   Constitutional:  Negative for chills, fatigue and fever.   HENT:  Negative for congestion, hearing loss, rhinorrhea, sinus pressure, sinus pain and tinnitus.    Eyes:  Negative for visual disturbance.   Respiratory:  Negative for cough, shortness of breath and wheezing.    Cardiovascular:  Negative for chest pain, palpitations and leg swelling.   Gastrointestinal:  Negative for constipation, diarrhea, nausea and vomiting.   Endocrine: Negative for cold intolerance, heat intolerance, polydipsia and polyuria.   Genitourinary:  Negative for dysuria, frequency and urgency.   Musculoskeletal:  Positive for arthralgias and myalgias.   Skin:  Negative for pallor, rash and wound.   Neurological:  Negative for dizziness, light-headedness and headaches.   Psychiatric/Behavioral:  Negative for dysphoric mood and sleep disturbance. The patient is not nervous/anxious.        Objective     Visit Vitals  /80 (BP Location: Left arm, Patient Position: Sitting, BP Cuff Size: Adult)   Pulse 80   Ht 1.778 m (5' 10\")   Wt 81.6 kg (180 lb)   SpO2 98%   BMI 25.83 kg/m²   Smoking Status Former   BSA 2.01 m²         Physical Exam  Constitutional:       Appearance: Normal appearance.   HENT:      Head: Normocephalic and atraumatic.      Right Ear: Tympanic membrane, ear canal and external ear normal.      Left Ear: Tympanic membrane, ear canal and external ear normal.      Nose: Nose normal.      Mouth/Throat:      Mouth: Mucous " membranes are moist.      Pharynx: Oropharynx is clear.   Eyes:      Extraocular Movements: Extraocular movements intact.      Conjunctiva/sclera: Conjunctivae normal.      Pupils: Pupils are equal, round, and reactive to light.   Cardiovascular:      Rate and Rhythm: Normal rate and regular rhythm.      Pulses: Normal pulses.      Heart sounds: Normal heart sounds.      Comments: Pulses intact distal to injury.  Pulmonary:      Effort: Pulmonary effort is normal.      Breath sounds: Normal breath sounds.   Abdominal:      General: There is no distension.      Palpations: Abdomen is soft.      Tenderness: There is no abdominal tenderness.   Musculoskeletal:         General: Normal range of motion.      Comments: At rest, patient sitting with right scapula shifted superior laterally and right humerus internally rotated.  Able to move to symmetrical stance with effort.    There is reduced active flexion and abduction of the right shoulder, normal motion of left shoulder.  Passive flexion and abduction of right shoulder demonstrate normal range of motion, but with pain reproduced.    There is weakness in external rotation of the right shoulder.  There is normal strength in internal rotation of the right shoulder.  There is a positive Romero Rah and empty can test on the right.  There is a negative Beer's test on the right.   Skin:     General: Skin is warm and dry.   Neurological:      Mental Status: He is alert and oriented to person, place, and time. Mental status is at baseline.   Psychiatric:         Mood and Affect: Mood normal.         Behavior: Behavior normal.         Assessment & Plan  Injury of right shoulder, initial encounter    Orders:    XR shoulder right 2+ views    US MSK upper extremity joints tendons muscles; Future    cyclobenzaprine (Flexeril) 10 mg tablet; Take 1 tablet (10 mg) by mouth 3 times a day as needed for muscle spasms.    HYDROcodone-acetaminophen (Norco) 5-325 mg tablet; Take 1  tablet by mouth every 6 hours if needed for severe pain (7 - 10) for up to 7 days.  Will obtain imaging to assess bony and muscular structures.  If ultrasound is not revealing, MRI of the shoulder would be appropriate.    Provided information regarding orthopedic same-day clinic in the event that patient was pursuing expedited workup.       Reviewed and approved by RAYMOND DU on 2/12/25 at 7:47 PM.

## 2025-03-03 ENCOUNTER — APPOINTMENT (OUTPATIENT)
Dept: RADIOLOGY | Facility: HOSPITAL | Age: 50
End: 2025-03-03
Payer: COMMERCIAL

## 2025-03-11 PROCEDURE — RXMED WILLOW AMBULATORY MEDICATION CHARGE

## 2025-03-13 ENCOUNTER — PHARMACY VISIT (OUTPATIENT)
Dept: PHARMACY | Facility: CLINIC | Age: 50
End: 2025-03-13
Payer: COMMERCIAL

## 2025-04-09 ENCOUNTER — TELEPHONE (OUTPATIENT)
Dept: PRIMARY CARE | Facility: CLINIC | Age: 50
End: 2025-04-09
Payer: COMMERCIAL

## 2025-04-09 DIAGNOSIS — I10 PRIMARY HYPERTENSION: ICD-10-CM

## 2025-04-09 DIAGNOSIS — R73.01 IFG (IMPAIRED FASTING GLUCOSE): ICD-10-CM

## 2025-04-09 DIAGNOSIS — E78.5 DYSLIPIDEMIA: ICD-10-CM

## 2025-04-25 DIAGNOSIS — I10 HYPERTENSION, UNSPECIFIED TYPE: ICD-10-CM

## 2025-04-25 PROCEDURE — RXMED WILLOW AMBULATORY MEDICATION CHARGE

## 2025-04-25 RX ORDER — LISINOPRIL 10 MG/1
10 TABLET ORAL DAILY
Qty: 90 TABLET | Refills: 1 | Status: SHIPPED | OUTPATIENT
Start: 2025-04-25 | End: 2025-07-24

## 2025-04-28 ENCOUNTER — PHARMACY VISIT (OUTPATIENT)
Dept: PHARMACY | Facility: CLINIC | Age: 50
End: 2025-04-28
Payer: COMMERCIAL

## 2025-04-30 ENCOUNTER — HOSPITAL ENCOUNTER (EMERGENCY)
Facility: HOSPITAL | Age: 50
Discharge: PSYCHIATRIC OR SUBSTANCE USE FACILITY | DRG: 881 | End: 2025-05-01
Attending: EMERGENCY MEDICINE
Payer: COMMERCIAL

## 2025-04-30 ENCOUNTER — APPOINTMENT (OUTPATIENT)
Dept: CARDIOLOGY | Facility: HOSPITAL | Age: 50
DRG: 881 | End: 2025-04-30
Payer: COMMERCIAL

## 2025-04-30 DIAGNOSIS — F29 PSYCHOSIS, UNSPECIFIED PSYCHOSIS TYPE (MULTI): Primary | ICD-10-CM

## 2025-04-30 LAB
ALBUMIN SERPL BCP-MCNC: 5 G/DL (ref 3.4–5)
ALP SERPL-CCNC: 43 U/L (ref 33–120)
ALT SERPL W P-5'-P-CCNC: 27 U/L (ref 10–52)
AMPHETAMINES UR QL SCN: ABNORMAL
ANION GAP SERPL CALC-SCNC: 15 MMOL/L (ref 10–20)
APAP SERPL-MCNC: <10 UG/ML (ref ?–30)
AST SERPL W P-5'-P-CCNC: 29 U/L (ref 9–39)
BARBITURATES UR QL SCN: ABNORMAL
BASOPHILS # BLD AUTO: 0.04 X10*3/UL (ref 0–0.1)
BASOPHILS NFR BLD AUTO: 0.4 %
BENZODIAZ UR QL SCN: ABNORMAL
BILIRUB SERPL-MCNC: 0.5 MG/DL (ref 0–1.2)
BUN SERPL-MCNC: 10 MG/DL (ref 6–23)
BZE UR QL SCN: ABNORMAL
CALCIUM SERPL-MCNC: 9.3 MG/DL (ref 8.6–10.3)
CANNABINOIDS UR QL SCN: ABNORMAL
CHLORIDE SERPL-SCNC: 99 MMOL/L (ref 98–107)
CO2 SERPL-SCNC: 26 MMOL/L (ref 21–32)
CREAT SERPL-MCNC: 0.92 MG/DL (ref 0.5–1.3)
EGFRCR SERPLBLD CKD-EPI 2021: >90 ML/MIN/1.73M*2
EOSINOPHIL # BLD AUTO: 0.14 X10*3/UL (ref 0–0.7)
EOSINOPHIL NFR BLD AUTO: 1.3 %
ERYTHROCYTE [DISTWIDTH] IN BLOOD BY AUTOMATED COUNT: 12.8 % (ref 11.5–14.5)
ETHANOL SERPL-MCNC: <10 MG/DL
FENTANYL+NORFENTANYL UR QL SCN: ABNORMAL
GLUCOSE SERPL-MCNC: 131 MG/DL (ref 74–99)
HCT VFR BLD AUTO: 44.2 % (ref 41–52)
HGB BLD-MCNC: 15.8 G/DL (ref 13.5–17.5)
IMM GRANULOCYTES # BLD AUTO: 0.02 X10*3/UL (ref 0–0.7)
IMM GRANULOCYTES NFR BLD AUTO: 0.2 % (ref 0–0.9)
LYMPHOCYTES # BLD AUTO: 1.88 X10*3/UL (ref 1.2–4.8)
LYMPHOCYTES NFR BLD AUTO: 17.4 %
MCH RBC QN AUTO: 30.9 PG (ref 26–34)
MCHC RBC AUTO-ENTMCNC: 35.7 G/DL (ref 32–36)
MCV RBC AUTO: 86 FL (ref 80–100)
METHADONE UR QL SCN: ABNORMAL
MONOCYTES # BLD AUTO: 0.93 X10*3/UL (ref 0.1–1)
MONOCYTES NFR BLD AUTO: 8.6 %
NEUTROPHILS # BLD AUTO: 7.82 X10*3/UL (ref 1.2–7.7)
NEUTROPHILS NFR BLD AUTO: 72.1 %
NRBC BLD-RTO: 0 /100 WBCS (ref 0–0)
OPIATES UR QL SCN: ABNORMAL
OXYCODONE+OXYMORPHONE UR QL SCN: ABNORMAL
PCP UR QL SCN: ABNORMAL
PLATELET # BLD AUTO: 303 X10*3/UL (ref 150–450)
POTASSIUM SERPL-SCNC: 3.7 MMOL/L (ref 3.5–5.3)
PROT SERPL-MCNC: 7.7 G/DL (ref 6.4–8.2)
RBC # BLD AUTO: 5.12 X10*6/UL (ref 4.5–5.9)
SALICYLATES SERPL-MCNC: <3 MG/DL (ref ?–20)
SODIUM SERPL-SCNC: 136 MMOL/L (ref 136–145)
WBC # BLD AUTO: 10.8 X10*3/UL (ref 4.4–11.3)

## 2025-04-30 PROCEDURE — 80307 DRUG TEST PRSMV CHEM ANLYZR: CPT | Performed by: NURSE PRACTITIONER

## 2025-04-30 PROCEDURE — 85025 COMPLETE CBC W/AUTO DIFF WBC: CPT | Performed by: NURSE PRACTITIONER

## 2025-04-30 PROCEDURE — 93005 ELECTROCARDIOGRAM TRACING: CPT

## 2025-04-30 PROCEDURE — 80053 COMPREHEN METABOLIC PANEL: CPT | Performed by: NURSE PRACTITIONER

## 2025-04-30 PROCEDURE — 36415 COLL VENOUS BLD VENIPUNCTURE: CPT | Performed by: NURSE PRACTITIONER

## 2025-04-30 PROCEDURE — 80143 DRUG ASSAY ACETAMINOPHEN: CPT | Performed by: NURSE PRACTITIONER

## 2025-04-30 PROCEDURE — 99285 EMERGENCY DEPT VISIT HI MDM: CPT | Performed by: EMERGENCY MEDICINE

## 2025-04-30 PROCEDURE — 80320 DRUG SCREEN QUANTALCOHOLS: CPT | Performed by: NURSE PRACTITIONER

## 2025-04-30 SDOH — HEALTH STABILITY: MENTAL HEALTH: SUICIDE ASSESSMENT: ADULT (C-SSRS)

## 2025-04-30 SDOH — HEALTH STABILITY: MENTAL HEALTH: HAVE YOU HAD THESE THOUGHTS AND HAD SOME INTENTION OF ACTING ON THEM?: NO

## 2025-04-30 SDOH — HEALTH STABILITY: MENTAL HEALTH: BEHAVIORAL HEALTH(WDL): WITHIN DEFINED LIMITS

## 2025-04-30 SDOH — HEALTH STABILITY: MENTAL HEALTH: HAVE YOU BEEN THINKING ABOUT HOW YOU MIGHT DO THIS?: NO

## 2025-04-30 SDOH — HEALTH STABILITY: MENTAL HEALTH: HAVE YOU EVER DONE ANYTHING, STARTED TO DO ANYTHING, OR PREPARED TO DO ANYTHING TO END YOUR LIFE?: NO

## 2025-04-30 SDOH — HEALTH STABILITY: MENTAL HEALTH
HAVE YOU STARTED TO WORK OUT OR WORKED OUT THE DETAILS OF HOW TO KILL YOURSELF? DO YOU INTENT TO CARRY OUT THIS PLAN?: NO

## 2025-04-30 SDOH — HEALTH STABILITY: MENTAL HEALTH: HAVE YOU WISHED YOU WERE DEAD OR WISHED YOU COULD GO TO SLEEP AND NOT WAKE UP?: YES

## 2025-04-30 SDOH — HEALTH STABILITY: MENTAL HEALTH: HAVE YOU ACTUALLY HAD ANY THOUGHTS OF KILLING YOURSELF?: YES

## 2025-04-30 ASSESSMENT — PAIN SCALES - GENERAL
PAINLEVEL_OUTOF10: 0 - NO PAIN

## 2025-04-30 ASSESSMENT — LIFESTYLE VARIABLES
EVER FELT BAD OR GUILTY ABOUT YOUR DRINKING: NO
HAVE PEOPLE ANNOYED YOU BY CRITICIZING YOUR DRINKING: NO
HAVE YOU EVER FELT YOU SHOULD CUT DOWN ON YOUR DRINKING: NO
TOTAL SCORE: 0
EVER HAD A DRINK FIRST THING IN THE MORNING TO STEADY YOUR NERVES TO GET RID OF A HANGOVER: NO

## 2025-04-30 ASSESSMENT — COLUMBIA-SUICIDE SEVERITY RATING SCALE - C-SSRS
1. IN THE PAST MONTH, HAVE YOU WISHED YOU WERE DEAD OR WISHED YOU COULD GO TO SLEEP AND NOT WAKE UP?: NO
6. HAVE YOU EVER DONE ANYTHING, STARTED TO DO ANYTHING, OR PREPARED TO DO ANYTHING TO END YOUR LIFE?: NO
2. HAVE YOU ACTUALLY HAD ANY THOUGHTS OF KILLING YOURSELF?: NO

## 2025-04-30 ASSESSMENT — PAIN - FUNCTIONAL ASSESSMENT: PAIN_FUNCTIONAL_ASSESSMENT: 0-10

## 2025-04-30 NOTE — ED PROVIDER NOTES
"Emergency Department Encounter  [facility] EMERGENCY MEDICINE    Patient: Roman Ellis  MRN: [id number]  : 1975  Date of Evaluation: 2025  ED Provider: MANDI Kirkpatrick    ED care was supervised by Dr. Horner who independently examined and evaluated the patient. Please see their attestation note for further details.    Limitations to history: none  Independent Historian: self, wife  Records reviewed: Care everywhere, paper chart, Inpatient and outpatient notes    Chief Complaint       Chief Complaint   Patient presents with    Brought in by RADHAO-probated today for SI comments     [place]    (Location/Symptom, Timing/Onset, Context/Setting, Quality, Duration, Modifying Factors, Severity) Note limiting factors.   Roman Ellis is a 49 y.o. male with past medical history of anxiety, hypertension, previous history of alcohol abuse, who presents to the emergency department complaining of psych eval, brought in by  for evaluation. He states to call his wife to find out why he is here: Patient denies any chest pain, shortness of breath, abdominal pain, nausea, vomiting.  Denies any suicidal ideation, homicidal ideation, hallucinations.  Per wife (wife is a RN on a  psych unit), has been in AA since February, now having increased usage of marijuana.  Last few days has been increasingly irritable, not sleeping, agitated.  In the past few days, made the statement to wife that he would \"blow his fucking head off\".  Has family hx suicide (grandfather).  Wife does report he has access to guns.  Wife noticed today that he was out on the porch talking to himself.  Wife's cousin came to the house to get the kids so they wouldn't see the officers take him, cousin got there first and patient answered the door with a knife in his hand (unopened pocket knife).  Wife also states that he has stopped showering, took the last 2 days off.  No psychiatric diagnosis other than anxiety, was on " venlofaxine 75mg but unsure if he is taking that.  ROS:     Review of Systems  14 systems reviewed and otherwise acutely negative except as in the [place].      Past History   Medical History[1]  Surgical History[2]  Social History[3]    Medications/Allergies     Previous Medications    ACETAMINOPHEN (TYLENOL) 500 MG TABLET    Take 1 tablet (500 mg) by mouth every 6 hours if needed for mild pain (1 - 3).    AMLODIPINE (NORVASC) 10 MG TABLET    TAKE 1 TABLET BY MOUTH ONE TIME DAILY    CYCLOBENZAPRINE (FLEXERIL) 10 MG TABLET    Take 1 tablet (10 mg) by mouth 3 times a day as needed for muscle spasms.    LISINOPRIL 10 MG TABLET    TAKE 1 TABLET BY MOUTH ONCE DAILY    VENLAFAXINE XR (EFFEXOR-XR) 75 MG 24 HR CAPSULE    Take 1 capsule by mouth once a day with food     Allergies[4]     Physical Exam       ED Triage Vitals [04/30/25 1639]   Temperature Heart Rate Respirations BP   36.8 °C (98.2 °F) (!) 107 17 (!) 187/98      Pulse Ox Temp Source Heart Rate Source Patient Position   98 % Temporal Monitor --      BP Location FiO2 (%)     Right arm --         Physical Exam    GENERAL:  The patient appears nourished and normally developed. Vital signs as documented.     HEENT:  Head normocephalic, atraumatic, EOMs intact, PERRLA, Mucous membranes moist. Nares patent without copious rhinorrhea.  No lymphadenopathy.    PULMONARY:  Lungs are clear to auscultation, without any respiratory distress. Able to speak full sentences, no accessory muscle use    CARDIAC:   Normal rate. No murmurs, rubs or gallops    ABDOMEN:  Soft, non-distended, non-tender, BS positive x 4 quadrants, No rebound or guarding, no peritoneal signs, no CVA tenderness, no masses or organomegaly      MUSCULOSKELETAL:   Able to ambulate, Non edematous, with no obvious deformities. Pulses intact distal    SKIN:   Good color, with no significant rashes.  No pallor.    NEURO:  No obvious neurological deficits, normal sensation and strength bilaterally.  Able to follow  commands, NIH 0, CN 2-12 intact.    Psych: Cooperative, pressured speech      Diagnostics   Labs:  Results for orders placed or performed during the hospital encounter of 04/30/25   CBC and Auto Differential    Collection Time: 04/30/25  4:49 PM   Result Value Ref Range    WBC 10.8 4.4 - 11.3 x10*3/uL    nRBC 0.0 0.0 - 0.0 /100 WBCs    RBC 5.12 4.50 - 5.90 x10*6/uL    Hemoglobin 15.8 13.5 - 17.5 g/dL    Hematocrit 44.2 41.0 - 52.0 %    MCV 86 80 - 100 fL    MCH 30.9 26.0 - 34.0 pg    MCHC 35.7 32.0 - 36.0 g/dL    RDW 12.8 11.5 - 14.5 %    Platelets 303 150 - 450 x10*3/uL    Neutrophils % 72.1 40.0 - 80.0 %    Immature Granulocytes %, Automated 0.2 0.0 - 0.9 %    Lymphocytes % 17.4 13.0 - 44.0 %    Monocytes % 8.6 2.0 - 10.0 %    Eosinophils % 1.3 0.0 - 6.0 %    Basophils % 0.4 0.0 - 2.0 %    Neutrophils Absolute 7.82 (H) 1.20 - 7.70 x10*3/uL    Immature Granulocytes Absolute, Automated 0.02 0.00 - 0.70 x10*3/uL    Lymphocytes Absolute 1.88 1.20 - 4.80 x10*3/uL    Monocytes Absolute 0.93 0.10 - 1.00 x10*3/uL    Eosinophils Absolute 0.14 0.00 - 0.70 x10*3/uL    Basophils Absolute 0.04 0.00 - 0.10 x10*3/uL   Comprehensive Metabolic Panel    Collection Time: 04/30/25  4:49 PM   Result Value Ref Range    Glucose 131 (H) 74 - 99 mg/dL    Sodium 136 136 - 145 mmol/L    Potassium 3.7 3.5 - 5.3 mmol/L    Chloride 99 98 - 107 mmol/L    Bicarbonate 26 21 - 32 mmol/L    Anion Gap 15 10 - 20 mmol/L    Urea Nitrogen 10 6 - 23 mg/dL    Creatinine 0.92 0.50 - 1.30 mg/dL    eGFR >90 >60 mL/min/1.73m*2    Calcium 9.3 8.6 - 10.3 mg/dL    Albumin 5.0 3.4 - 5.0 g/dL    Alkaline Phosphatase 43 33 - 120 U/L    Total Protein 7.7 6.4 - 8.2 g/dL    AST 29 9 - 39 U/L    Bilirubin, Total 0.5 0.0 - 1.2 mg/dL    ALT 27 10 - 52 U/L   Acute Toxicology Panel, Blood    Collection Time: 04/30/25  4:49 PM   Result Value Ref Range    Acetaminophen <10.0 10.0 - 30.0 ug/mL    Salicylate  <3 4 - 20 mg/dL    Alcohol <10 <=10 mg/dL   Drug Screen, Urine     "Collection Time: 04/30/25  4:53 PM   Result Value Ref Range    Amphetamine Screen, Urine Presumptive Negative Presumptive Negative    Barbiturate Screen, Urine Presumptive Negative Presumptive Negative    Benzodiazepines Screen, Urine Presumptive Negative Presumptive Negative    Cannabinoid Screen, Urine Presumptive Positive (A) Presumptive Negative    Cocaine Metabolite Screen, Urine Presumptive Negative Presumptive Negative    Fentanyl Screen, Urine Presumptive Negative Presumptive Negative    Opiate Screen, Urine Presumptive Negative Presumptive Negative    Oxycodone Screen, Urine Presumptive Negative Presumptive Negative    PCP Screen, Urine Presumptive Negative Presumptive Negative    Methadone Screen, Urine Presumptive Negative Presumptive Negative     Radiographs:  No orders to display       Procedures:   Procedures     EKG: Independently reviewed by this provider at 1627  Indication: Medical clearance  Rate: 112  Rhythm: Sinus tachycardia  Interval: Normal  Axis: Normal  ST Segment: No ST elevation    Assessment   In brief, Roman Ellis is a 49 y.o. male who presented to the emergency department for psychiatric evaluation, wife is concerned about psychosis drug-induced versus nondrug-induced bobo.    Plan   Medical clearance and evaluation by Radha Yeh    Differentials   Drug-induced psychosis  Manic episode  Psychosis  Intoxication    ED Course     Diagnoses as of 04/30/25 1729   Psychosis, unspecified psychosis type (Multi)       Visit Vitals  BP (!) 187/98 (BP Location: Right arm)   Pulse (!) 107   Temp 36.8 °C (98.2 °F) (Temporal)   Resp 17   Ht 1.753 m (5' 9\")   Wt 86.2 kg (190 lb)   SpO2 98%   BMI 28.06 kg/m²   Smoking Status Every Day   BSA 2.05 m²       Medications - No data to display    Plan of care discussed, patient is stable appearing, has been medically cleared, has been calm, cooperative.  Spoke with patient's wife, plan is for patient to be evaluated by Radha Yeh      Final Impression  "     1. Psychosis, unspecified psychosis type (Multi)          DISPOSITION  Disposition: {Plan; disposition:32897}  Patient condition is {condition:46725}    Comment: Please note this report has been produced using speech recognition software and may contain errors related to that system including errors in grammar, punctuation, and spelling, as well as words and phrases that may be inappropriate.  If there are any questions or concerns please feel free to contact the dictating provider for clarification.    KOKI Kirkpatrick-MICHELLE         [1]   Past Medical History:  Diagnosis Date    Fever 02/12/2025    History of laparoscopic appendectomy 02/12/2025   [2] History reviewed. No pertinent surgical history.  [3]   Social History  Socioeconomic History    Marital status:    Tobacco Use    Smoking status: Every Day     Types: Cigarettes    Smokeless tobacco: Never   Vaping Use    Vaping status: Never Used   Substance and Sexual Activity    Alcohol use: Not Currently    Drug use: Yes     Types: Marijuana   [4] No Known Allergies       Substance and Sexual Activity    Alcohol use: Not Currently    Drug use: Yes     Types: Marijuana     Social Drivers of Health     Financial Resource Strain: Low Risk  (5/2/2025)    Overall Financial Resource Strain (CARDIA)     Difficulty of Paying Living Expenses: Not hard at all   Food Insecurity: No Food Insecurity (5/1/2025)    Hunger Vital Sign     Worried About Running Out of Food in the Last Year: Never true     Ran Out of Food in the Last Year: Never true   Transportation Needs: No Transportation Needs (5/2/2025)    PRAPARE - Transportation     Lack of Transportation (Medical): No     Lack of Transportation (Non-Medical): No   Physical Activity: Sufficiently Active (5/1/2025)    Exercise Vital Sign     Days of Exercise per Week: 7 days     Minutes of Exercise per Session: 30 min   Stress: No Stress Concern Present (5/1/2025)    [employer] [place] [employer] - Occupational Stress Questionnaire     Feeling of Stress : Not at all   Social Connections: Moderately Integrated (5/1/2025)    Social Connection and Isolation Panel [NHANES]     Frequency of Communication with Friends and Family: Twice a week     Frequency of Social Gatherings with Friends and Family: Once a week     Attends Druze Services: More than 4 times per year     Active Member of Clubs or Organizations: No     Attends Club or Organization Meetings: Never     Marital Status:    Intimate Partner Violence: Not At Risk (5/1/2025)    Humiliation, Afraid, Rape, and Kick questionnaire     Fear of Current or Ex-Partner: No     Emotionally Abused: No     Physically Abused: No     Sexually Abused: No   Housing Stability: Low Risk  (5/2/2025)    Housing Stability Vital Sign     Unable to Pay for Housing in the Last Year: No     Number of Times Moved in the Last Year: 1     Homeless in the Last Year: No   [4] No Known Allergies       KOKI Kirkpatrick-MICHELLE  05/07/25 1322

## 2025-04-30 NOTE — ED PROVIDER NOTES
The patient was seen by the midlevel/resident.  I have personally saw the patient and made/approved the management plan and take responsibility for the patient management.  I reviewed the EKG's (when done) and agree with the interpretation.  I have seen and examined the patient; agree with the workup, evaluation, MDM, and diagnosis.  The care plan has been discussed with the midlevel/resident; I have reviewed the note and agree with the documented findings.     Patient was probated by his wife for bizarre behavior and suicidal thoughts.  He has history of alcohol abuse and of using marijuana.  He has had 1 episode in the remote past that was similar with delta 8 THC.  Concerned from the probate paperwork suggest the patient has been talking about blowing his brains out like his grandfather did and today he answered the door with a knife in his hand.  He was medically cleared and we are awaiting Valentin evaluation due to the probate.    Patient was endorsed to oncoming physician Dr. Jean Baptiste at 2200 awaiting placement by Radha Yeh/[place]    MEDICALLY CLEARED  ED Course as of 04/30/25 2352 Wed Apr 30, 2025 2024 Radha Yeh saw the patient in the agree that patient requires hospitalization.  They will forward the report to [facility]. [RZ]      ED Course User Index  [RZ] Anthony Horner MD         Diagnoses as of 04/30/25 2352   Psychosis, unspecified psychosis type (Multi)     MD Anthony Mittal MD  04/30/25 1725       Anthony Horner MD  04/30/25 2352

## 2025-05-01 ENCOUNTER — HOSPITAL ENCOUNTER (INPATIENT)
Facility: HOSPITAL | Age: 50
End: 2025-05-01
Attending: PSYCHIATRY & NEUROLOGY | Admitting: PSYCHIATRY & NEUROLOGY
Payer: COMMERCIAL

## 2025-05-01 VITALS
DIASTOLIC BLOOD PRESSURE: 95 MMHG | WEIGHT: 190 LBS | BODY MASS INDEX: 28.14 KG/M2 | SYSTOLIC BLOOD PRESSURE: 164 MMHG | OXYGEN SATURATION: 98 % | HEART RATE: 72 BPM | TEMPERATURE: 98.4 F | RESPIRATION RATE: 20 BRPM | HEIGHT: 69 IN

## 2025-05-01 DIAGNOSIS — R45.851 DEPRESSION WITH SUICIDAL IDEATION: ICD-10-CM

## 2025-05-01 DIAGNOSIS — F41.9 ANXIETY: ICD-10-CM

## 2025-05-01 DIAGNOSIS — F17.200 TOBACCO USE DISORDER: Primary | ICD-10-CM

## 2025-05-01 DIAGNOSIS — F32.A DEPRESSION WITH SUICIDAL IDEATION: ICD-10-CM

## 2025-05-01 PROCEDURE — S4991 NICOTINE PATCH NONLEGEND: HCPCS | Performed by: NURSE PRACTITIONER

## 2025-05-01 PROCEDURE — 1240000001 HC SEMI-PRIVATE BH ROOM DAILY

## 2025-05-01 PROCEDURE — 2500000001 HC RX 250 WO HCPCS SELF ADMINISTERED DRUGS (ALT 637 FOR MEDICARE OP): Performed by: NURSE PRACTITIONER

## 2025-05-01 PROCEDURE — 2500000002 HC RX 250 W HCPCS SELF ADMINISTERED DRUGS (ALT 637 FOR MEDICARE OP, ALT 636 FOR OP/ED): Performed by: NURSE PRACTITIONER

## 2025-05-01 PROCEDURE — 99252 IP/OBS CONSLTJ NEW/EST SF 35: CPT | Performed by: NURSE PRACTITIONER

## 2025-05-01 PROCEDURE — 2500000001 HC RX 250 WO HCPCS SELF ADMINISTERED DRUGS (ALT 637 FOR MEDICARE OP): Performed by: PSYCHIATRY & NEUROLOGY

## 2025-05-01 RX ORDER — IBUPROFEN 200 MG
1 TABLET ORAL DAILY
Status: DISPENSED | OUTPATIENT
Start: 2025-05-01

## 2025-05-01 RX ORDER — DIPHENHYDRAMINE HYDROCHLORIDE 50 MG/ML
50 INJECTION, SOLUTION INTRAMUSCULAR; INTRAVENOUS ONCE AS NEEDED
Status: ACTIVE | OUTPATIENT
Start: 2025-05-01

## 2025-05-01 RX ORDER — HALOPERIDOL 5 MG/1
10 TABLET ORAL EVERY 6 HOURS PRN
Status: ACTIVE | OUTPATIENT
Start: 2025-05-01

## 2025-05-01 RX ORDER — MICONAZOLE NITRATE 2 %
2 CREAM (GRAM) TOPICAL EVERY 2 HOUR PRN
Status: ACTIVE | OUTPATIENT
Start: 2025-05-01

## 2025-05-01 RX ORDER — HALOPERIDOL LACTATE 5 MG/ML
10 INJECTION, SOLUTION INTRAMUSCULAR EVERY 6 HOURS PRN
Status: ACTIVE | OUTPATIENT
Start: 2025-05-01

## 2025-05-01 RX ORDER — AMLODIPINE BESYLATE 5 MG/1
5 TABLET ORAL DAILY
Status: DISPENSED | OUTPATIENT
Start: 2025-05-01

## 2025-05-01 RX ORDER — LORAZEPAM 2 MG/ML
2 INJECTION INTRAMUSCULAR EVERY 6 HOURS PRN
Status: ACTIVE | OUTPATIENT
Start: 2025-05-01

## 2025-05-01 RX ORDER — HYDROXYZINE HYDROCHLORIDE 25 MG/1
50 TABLET, FILM COATED ORAL EVERY 4 HOURS PRN
Status: ACTIVE | OUTPATIENT
Start: 2025-05-01

## 2025-05-01 RX ORDER — LORAZEPAM 1 MG/1
2 TABLET ORAL EVERY 6 HOURS PRN
Status: ACTIVE | OUTPATIENT
Start: 2025-05-01

## 2025-05-01 RX ORDER — ACETAMINOPHEN 500 MG
5 TABLET ORAL NIGHTLY PRN
Status: ACTIVE | OUTPATIENT
Start: 2025-05-01

## 2025-05-01 RX ORDER — LISINOPRIL 5 MG/1
10 TABLET ORAL DAILY
Status: DISPENSED | OUTPATIENT
Start: 2025-05-01

## 2025-05-01 RX ORDER — ALUMINUM HYDROXIDE, MAGNESIUM HYDROXIDE, AND SIMETHICONE 1200; 120; 1200 MG/30ML; MG/30ML; MG/30ML
30 SUSPENSION ORAL EVERY 6 HOURS PRN
Status: ACTIVE | OUTPATIENT
Start: 2025-05-01

## 2025-05-01 RX ORDER — ACETAMINOPHEN 325 MG/1
650 TABLET ORAL EVERY 4 HOURS PRN
Status: ACTIVE | OUTPATIENT
Start: 2025-05-01

## 2025-05-01 RX ORDER — DIPHENHYDRAMINE HCL 50 MG
50 CAPSULE ORAL EVERY 6 HOURS PRN
Status: ACTIVE | OUTPATIENT
Start: 2025-05-01

## 2025-05-01 RX ORDER — POLYETHYLENE GLYCOL 3350 17 G/17G
17 POWDER, FOR SOLUTION ORAL DAILY PRN
Status: ACTIVE | OUTPATIENT
Start: 2025-05-01

## 2025-05-01 RX ADMIN — AMLODIPINE BESYLATE 5 MG: 5 TABLET ORAL at 16:34

## 2025-05-01 RX ADMIN — LISINOPRIL 10 MG: 5 TABLET ORAL at 14:11

## 2025-05-01 SDOH — ECONOMIC STABILITY: INCOME INSECURITY: IN THE PAST 12 MONTHS HAS THE ELECTRIC, GAS, OIL, OR WATER COMPANY THREATENED TO SHUT OFF SERVICES IN YOUR HOME?: NO

## 2025-05-01 SDOH — SOCIAL STABILITY: SOCIAL NETWORK: PARENT/GUARDIAN/SIGNIFICANT OTHER INVOLVEMENT: OTHER (COMMENT)

## 2025-05-01 SDOH — SOCIAL STABILITY: SOCIAL NETWORK: HOW OFTEN DO YOU ATTEND CHURCH OR RELIGIOUS SERVICES?: MORE THAN 4 TIMES PER YEAR

## 2025-05-01 SDOH — ECONOMIC STABILITY: FOOD INSECURITY: WITHIN THE PAST 12 MONTHS, THE FOOD YOU BOUGHT JUST DIDN'T LAST AND YOU DIDN'T HAVE MONEY TO GET MORE.: NEVER TRUE

## 2025-05-01 SDOH — HEALTH STABILITY: MENTAL HEALTH
OTHER SUICIDE PRECAUTIONS INCLUDE: PATIENT PLACED IN AN EASILY OBSERVABLE ROOM WITH DOOR/CURTAIN REMAINING OPEN;PATIENT PLACED IN GOWN (SNAPS OR PAPER GOWNS PREFERRED) AND WANDED;REMAINING RISKS IDENTIFIED AND MITIGATED;PATIENT PLACED IN PSYCH SAFE ROOM (IF AVAILABLE);PROVIDER NOTIFIED;ELOPEMENT RISK IDENTIFIED;HOURLY BEHAVIORAL ASSESSMENT PERFORMED;PERSONAL BELONGINGS SECURED

## 2025-05-01 SDOH — SOCIAL STABILITY: SOCIAL INSECURITY: DO YOU FEEL ANYONE HAS EXPLOITED OR TAKEN ADVANTAGE OF YOU FINANCIALLY OR OF YOUR PERSONAL PROPERTY?: NO

## 2025-05-01 SDOH — SOCIAL STABILITY: SOCIAL INSECURITY: HAVE YOU HAD THOUGHTS OF HARMING ANYONE ELSE?: NO

## 2025-05-01 SDOH — ECONOMIC STABILITY: FOOD INSECURITY: WITHIN THE PAST 12 MONTHS, YOU WORRIED THAT YOUR FOOD WOULD RUN OUT BEFORE YOU GOT THE MONEY TO BUY MORE.: NEVER TRUE

## 2025-05-01 SDOH — HEALTH STABILITY: MENTAL HEALTH

## 2025-05-01 SDOH — HEALTH STABILITY: MENTAL HEALTH: HOW MANY DRINKS CONTAINING ALCOHOL DO YOU HAVE ON A TYPICAL DAY WHEN YOU ARE DRINKING?: PATIENT DOES NOT DRINK

## 2025-05-01 SDOH — ECONOMIC STABILITY: HOUSING INSECURITY: AT ANY TIME IN THE PAST 12 MONTHS, WERE YOU HOMELESS OR LIVING IN A SHELTER (INCLUDING NOW)?: NO

## 2025-05-01 SDOH — SOCIAL STABILITY: SOCIAL INSECURITY: ABUSE: ADULT

## 2025-05-01 SDOH — ECONOMIC STABILITY: TRANSPORTATION INSECURITY: IN THE PAST 12 MONTHS, HAS LACK OF TRANSPORTATION KEPT YOU FROM MEDICAL APPOINTMENTS OR FROM GETTING MEDICATIONS?: NO

## 2025-05-01 SDOH — SOCIAL STABILITY: SOCIAL INSECURITY: ARE YOU MARRIED, WIDOWED, DIVORCED, SEPARATED, NEVER MARRIED, OR LIVING WITH A PARTNER?: MARRIED

## 2025-05-01 SDOH — HEALTH STABILITY: MENTAL HEALTH
DO YOU FEEL STRESS - TENSE, RESTLESS, NERVOUS, OR ANXIOUS, OR UNABLE TO SLEEP AT NIGHT BECAUSE YOUR MIND IS TROUBLED ALL THE TIME - THESE DAYS?: NOT AT ALL

## 2025-05-01 SDOH — SOCIAL STABILITY: SOCIAL NETWORK: HOW OFTEN DO YOU GET TOGETHER WITH FRIENDS OR RELATIVES?: ONCE A WEEK

## 2025-05-01 SDOH — SOCIAL STABILITY: SOCIAL NETWORK: IN A TYPICAL WEEK, HOW MANY TIMES DO YOU TALK ON THE PHONE WITH FAMILY, FRIENDS, OR NEIGHBORS?: TWICE A WEEK

## 2025-05-01 SDOH — HEALTH STABILITY: PHYSICAL HEALTH: ON AVERAGE, HOW MANY DAYS PER WEEK DO YOU ENGAGE IN MODERATE TO STRENUOUS EXERCISE (LIKE A BRISK WALK)?: 7 DAYS

## 2025-05-01 SDOH — SOCIAL STABILITY: SOCIAL INSECURITY: WERE YOU ABLE TO COMPLETE ALL THE BEHAVIORAL HEALTH SCREENINGS?: YES

## 2025-05-01 SDOH — SOCIAL STABILITY: SOCIAL NETWORK
DO YOU BELONG TO ANY CLUBS OR ORGANIZATIONS SUCH AS CHURCH GROUPS, UNIONS, FRATERNAL OR ATHLETIC GROUPS, OR SCHOOL GROUPS?: NO

## 2025-05-01 SDOH — HEALTH STABILITY: MENTAL HEALTH: HOW OFTEN DO YOU HAVE A DRINK CONTAINING ALCOHOL?: NEVER

## 2025-05-01 SDOH — ECONOMIC STABILITY: HOUSING INSECURITY: IN THE LAST 12 MONTHS, WAS THERE A TIME WHEN YOU WERE NOT ABLE TO PAY THE MORTGAGE OR RENT ON TIME?: NO

## 2025-05-01 SDOH — SOCIAL STABILITY: SOCIAL NETWORK: VISITOR BEHAVIORS: UNABLE TO ASSESS

## 2025-05-01 SDOH — SOCIAL STABILITY: SOCIAL INSECURITY: WITHIN THE LAST YEAR, HAVE YOU BEEN AFRAID OF YOUR PARTNER OR EX-PARTNER?: NO

## 2025-05-01 SDOH — HEALTH STABILITY: MENTAL HEALTH: HOW OFTEN DO YOU HAVE SIX OR MORE DRINKS ON ONE OCCASION?: NEVER

## 2025-05-01 SDOH — SOCIAL STABILITY: SOCIAL INSECURITY
WITHIN THE LAST YEAR, HAVE YOU BEEN KICKED, HIT, SLAPPED, OR OTHERWISE PHYSICALLY HURT BY YOUR PARTNER OR EX-PARTNER?: NO

## 2025-05-01 SDOH — SOCIAL STABILITY: SOCIAL INSECURITY
WITHIN THE LAST YEAR, HAVE YOU BEEN RAPED OR FORCED TO HAVE ANY KIND OF SEXUAL ACTIVITY BY YOUR PARTNER OR EX-PARTNER?: NO

## 2025-05-01 SDOH — SOCIAL STABILITY: SOCIAL INSECURITY: WITHIN THE LAST YEAR, HAVE YOU BEEN HUMILIATED OR EMOTIONALLY ABUSED IN OTHER WAYS BY YOUR PARTNER OR EX-PARTNER?: NO

## 2025-05-01 SDOH — HEALTH STABILITY: MENTAL HEALTH: SUICIDE ASSESSMENT: ADULT (C-SSRS)

## 2025-05-01 SDOH — ECONOMIC STABILITY: HOUSING INSECURITY: IN THE PAST 12 MONTHS, HOW MANY TIMES HAVE YOU MOVED WHERE YOU WERE LIVING?: 1

## 2025-05-01 SDOH — HEALTH STABILITY: MENTAL HEALTH: FOR HIGH RISK PATIENTS: 1:1 PATIENT OBSERVER AT ALL TIMES

## 2025-05-01 SDOH — HEALTH STABILITY: MENTAL HEALTH: HAVE YOU EVER DONE ANYTHING, STARTED TO DO ANYTHING, OR PREPARED TO DO ANYTHING TO END YOUR LIFE?: NO

## 2025-05-01 SDOH — HEALTH STABILITY: PHYSICAL HEALTH
HOW OFTEN DO YOU NEED TO HAVE SOMEONE HELP YOU WHEN YOU READ INSTRUCTIONS, PAMPHLETS, OR OTHER WRITTEN MATERIAL FROM YOUR DOCTOR OR PHARMACY?: NEVER

## 2025-05-01 SDOH — ECONOMIC STABILITY: FOOD INSECURITY: HOW HARD IS IT FOR YOU TO PAY FOR THE VERY BASICS LIKE FOOD, HOUSING, MEDICAL CARE, AND HEATING?: NOT HARD AT ALL

## 2025-05-01 SDOH — HEALTH STABILITY: PHYSICAL HEALTH: ON AVERAGE, HOW MANY MINUTES DO YOU ENGAGE IN EXERCISE AT THIS LEVEL?: 30 MIN

## 2025-05-01 SDOH — HEALTH STABILITY: MENTAL HEALTH: HAVE YOU WISHED YOU WERE DEAD OR WISHED YOU COULD GO TO SLEEP AND NOT WAKE UP?: YES

## 2025-05-01 SDOH — SOCIAL STABILITY: SOCIAL INSECURITY: HAVE YOU HAD ANY THOUGHTS OF HARMING ANYONE ELSE?: NO

## 2025-05-01 SDOH — HEALTH STABILITY: MENTAL HEALTH: HAVE YOU ACTUALLY HAD ANY THOUGHTS OF KILLING YOURSELF?: YES

## 2025-05-01 SDOH — SOCIAL STABILITY: SOCIAL INSECURITY: ARE YOU OR HAVE YOU BEEN THREATENED OR ABUSED PHYSICALLY, EMOTIONALLY, OR SEXUALLY BY ANYONE?: NO

## 2025-05-01 SDOH — SOCIAL STABILITY: SOCIAL INSECURITY: ARE THERE ANY APPARENT SIGNS OF INJURIES/BEHAVIORS THAT COULD BE RELATED TO ABUSE/NEGLECT?: NO

## 2025-05-01 SDOH — HEALTH STABILITY: MENTAL HEALTH: CONTENT: BLAMING SELF;BLAMING OTHERS

## 2025-05-01 SDOH — HEALTH STABILITY: MENTAL HEALTH: BEHAVIORS/MOOD: LABILE;MANIPULATIVE;CALM;COOPERATIVE

## 2025-05-01 SDOH — SOCIAL STABILITY: SOCIAL NETWORK: HOW OFTEN DO YOU ATTEND MEETINGS OF THE CLUBS OR ORGANIZATIONS YOU BELONG TO?: NEVER

## 2025-05-01 SDOH — SOCIAL STABILITY: SOCIAL INSECURITY: DO YOU FEEL UNSAFE GOING BACK TO THE PLACE WHERE YOU ARE LIVING?: NO

## 2025-05-01 SDOH — HEALTH STABILITY: MENTAL HEALTH: HAVE YOU BEEN THINKING ABOUT HOW YOU MIGHT DO THIS?: NO

## 2025-05-01 SDOH — HEALTH STABILITY: MENTAL HEALTH: SLEEP PATTERN: SLEEPS ALL NIGHT

## 2025-05-01 SDOH — SOCIAL STABILITY: SOCIAL INSECURITY: FAMILY BEHAVIORS: UNABLE TO ASSESS

## 2025-05-01 SDOH — HEALTH STABILITY: MENTAL HEALTH: HAVE YOU HAD THESE THOUGHTS AND HAD SOME INTENTION OF ACTING ON THEM?: NO

## 2025-05-01 SDOH — SOCIAL STABILITY: SOCIAL INSECURITY: DOES ANYONE TRY TO KEEP YOU FROM HAVING/CONTACTING OTHER FRIENDS OR DOING THINGS OUTSIDE YOUR HOME?: NO

## 2025-05-01 SDOH — HEALTH STABILITY: MENTAL HEALTH: BEHAVIORAL HEALTH(WDL): EXCEPTIONS TO WDL

## 2025-05-01 SDOH — SOCIAL STABILITY: SOCIAL INSECURITY: HAS ANYONE EVER THREATENED TO HURT YOUR FAMILY OR YOUR PETS?: NO

## 2025-05-01 ASSESSMENT — LIFESTYLE VARIABLES
NAUSEA AND VOMITING: NO NAUSEA AND NO VOMITING
TOTAL_SCORE: 0
PAROXYSMAL SWEATS: NO SWEAT VISIBLE
HOW OFTEN DO YOU HAVE 6 OR MORE DRINKS ON ONE OCCASION: NEVER
ANXIETY: NO ANXIETY, AT EASE
ORIENTATION AND CLOUDING OF SENSORIUM: ORIENTED AND CAN DO SERIAL ADDITIONS
TREMOR: NO TREMOR
NAUSEA AND VOMITING: NO NAUSEA AND NO VOMITING
SUBSTANCE_ABUSE_PAST_12_MONTHS: YES
AUDIT-C TOTAL SCORE: 0
AUDITORY DISTURBANCES: NOT PRESENT
VISUAL DISTURBANCES: NOT PRESENT
AUDIT-C TOTAL SCORE: 0
AGITATION: NORMAL ACTIVITY
HOW OFTEN DO YOU HAVE A DRINK CONTAINING ALCOHOL: NEVER
HEADACHE, FULLNESS IN HEAD: NOT PRESENT
SKIP TO QUESTIONS 9-10: 1
PRESCIPTION_ABUSE_PAST_12_MONTHS: NO
SKIP TO QUESTIONS 9-10: 1
AUDIT-C TOTAL SCORE: 0
CIWA TOTAL SCORE: 0
HOW MANY STANDARD DRINKS CONTAINING ALCOHOL DO YOU HAVE ON A TYPICAL DAY: PATIENT DOES NOT DRINK

## 2025-05-01 ASSESSMENT — PATIENT HEALTH QUESTIONNAIRE - PHQ9
1. LITTLE INTEREST OR PLEASURE IN DOING THINGS: NOT AT ALL
2. FEELING DOWN, DEPRESSED OR HOPELESS: NOT AT ALL
SUM OF ALL RESPONSES TO PHQ9 QUESTIONS 1 & 2: 0

## 2025-05-01 ASSESSMENT — ACTIVITIES OF DAILY LIVING (ADL)
GROOMING: INDEPENDENT
DRESSING YOURSELF: INDEPENDENT
HEARING - RIGHT EAR: FUNCTIONAL
PATIENT'S MEMORY ADEQUATE TO SAFELY COMPLETE DAILY ACTIVITIES?: YES
HEARING - LEFT EAR: FUNCTIONAL
ADEQUATE_TO_COMPLETE_ADL: YES
JUDGMENT_ADEQUATE_SAFELY_COMPLETE_DAILY_ACTIVITIES: YES
TOILETING: INDEPENDENT
LACK_OF_TRANSPORTATION: NO
FEEDING YOURSELF: INDEPENDENT
BATHING: INDEPENDENT
WALKS IN HOME: INDEPENDENT

## 2025-05-01 ASSESSMENT — PAIN - FUNCTIONAL ASSESSMENT
PAIN_FUNCTIONAL_ASSESSMENT: 0-10
PAIN_FUNCTIONAL_ASSESSMENT: 0-10

## 2025-05-01 ASSESSMENT — PAIN SCALES - GENERAL
PAINLEVEL_OUTOF10: 0 - NO PAIN
PAINLEVEL_OUTOF10: 0 - NO PAIN

## 2025-05-01 NOTE — PROGRESS NOTES
"ED care transition note      Roman Ellis  49 y.o.       I took over care of this patient at 0600    Pt signed out to me pending placement.  In brief this patient was brought in by the  for evaluation.  There was concern that he answered the door with a knife in his hand.  He does have access to firearms in his home.  Signed out as medically clear with the EPAT to place.  I updated the patient about a plan for placement.  He is obviously frustrated by this.  States that he feels safe and fine at this point but also understands that he got himself into this situation. Will reluctantly follow the process.   Nurse from the [facility] came down to assess the patient.  He is excepted to the [facility] pending at discharge later today.  She updated the patient.      1. Psychosis, unspecified psychosis type (Multi)           Vitals:    04/30/25 1639 04/30/25 1800   BP: (!) 187/98 168/81   BP Location: Right arm    Pulse: (!) 107 98   Resp: 17 17   Temp: 36.8 °C (98.2 °F)    TempSrc: Temporal    SpO2: 98% 99%   Weight: 86.2 kg (190 lb)    Height: 1.753 m (5' 9\")         No orders to display      Labs Reviewed   CBC WITH AUTO DIFFERENTIAL - Abnormal       Result Value    WBC 10.8      nRBC 0.0      RBC 5.12      Hemoglobin 15.8      Hematocrit 44.2      MCV 86      MCH 30.9      MCHC 35.7      RDW 12.8      Platelets 303      Neutrophils % 72.1      Immature Granulocytes %, Automated 0.2      Lymphocytes % 17.4      Monocytes % 8.6      Eosinophils % 1.3      Basophils % 0.4      Neutrophils Absolute 7.82 (*)     Immature Granulocytes Absolute, Automated 0.02      Lymphocytes Absolute 1.88      Monocytes Absolute 0.93      Eosinophils Absolute 0.14      Basophils Absolute 0.04     COMPREHENSIVE METABOLIC PANEL - Abnormal    Glucose 131 (*)     Sodium 136      Potassium 3.7      Chloride 99      Bicarbonate 26      Anion Gap 15      Urea Nitrogen 10      Creatinine 0.92      eGFR >90      Calcium 9.3      Albumin 5.0  "     Alkaline Phosphatase 43      Total Protein 7.7      AST 29      Bilirubin, Total 0.5      ALT 27     DRUG SCREEN,URINE - Abnormal    Amphetamine Screen, Urine Presumptive Negative      Barbiturate Screen, Urine Presumptive Negative      Benzodiazepines Screen, Urine Presumptive Negative      Cannabinoid Screen, Urine Presumptive Positive (*)     Cocaine Metabolite Screen, Urine Presumptive Negative      Fentanyl Screen, Urine Presumptive Negative      Opiate Screen, Urine Presumptive Negative      Oxycodone Screen, Urine Presumptive Negative      PCP Screen, Urine Presumptive Negative      Methadone Screen, Urine Presumptive Negative      Narrative:     Drug screen results are presumptive and should not be used to assess   compliance with prescribed medication. Contact the performing [facility] laboratory   to add-on definitive confirmatory testing if clinically indicated.    Toxicology screening results are reported qualitatively. The concentration must   be greater than or equal to the cutoff to be reported as positive. The concentration   at which the screening test can detect an individual drug or metabolite varies.   The absence of expected drug(s) and/or drug metabolite(s) may indicate non-compliance,   inappropriate timing of specimen collection relative to drug administration, poor drug   absorption, diluted/adulterated urine, or limitations of testing. For medical purposes   only; not valid for forensic use.    Interpretive questions should be directed to the laboratory medical directors.   ACUTE TOXICOLOGY PANEL, BLOOD - Normal    Acetaminophen <10.0      Salicylate  <3      Alcohol <10          Kae Ambrose MD

## 2025-05-01 NOTE — CARE PLAN
"The patient's goals for the shift include \"to shower and put on clean clothes\"    The clinical goals for the shift include maintain safety    Over the shift, the patient did make progress toward the following goals. Barriers to progression include acuteness of illness. Recommendations to address these barriers include educate patient and maintain Q 15 minute rounds for patient safety.      Problem: Sensory Perceptual Alteration as Evidenced by  Goal: Cooperates with admission process  Outcome: Progressing     Problem: Sensory Perceptual Alteration as Evidenced by  Goal: Participates in unit activities  Outcome: Progressing     Problem: Sensory Perceptual Alteration as Evidenced by  Goal: Initiates reality-based interactions  Outcome: Progressing     Problem: Potential for Harm to Self or Others  Goal: Identifies deescalation techniques  Outcome: Progressing     Problem: Ineffective Coping  Goal: Identifies ineffective coping skills  Outcome: Progressing     Problem: Ineffective Coping  Goal: Identifies healthy coping skills  Outcome: Progressing     Problem: Ineffective Coping  Goal: Demonstrates healthy coping skills  Outcome: Progressing       "

## 2025-05-01 NOTE — CONSULTS
Inpatient consult to Medicine  Consult performed by: KOKI Marrero-CNP  Consult ordered by: Anthony Albarran MD  Reason for consult: Medical Management          Reason For Consult  Medical Management    History Of Present Illness  Roman Ellis is a 49 y.o. male with a medical history of HTN, ETOH Use, and Depression/Anxiety, who was brought into ED by  office due to bizarre behaviors and suicidal statements. In the ED, T. 36.8, , RR 17, /98, Pox 98%. In the ED, T. 36.8, , RR 17, /98, Pox 98%. Glucose 131, sodium 136, potassium 3.7, BUN 10, creatinine 0.92. WBC 10.8, Hgb 15.8, Plts 303. ETOH 10. Urine tox positive for cannabis. Cleared by [facility] for psychiatric admission. Consulted for medical management.  Seen and examined in [facility] this afternoon. Awake and alert. Very pleasant and cooperative. He offers no new complaints at this time. He denies chest pain, breathing difficulties, abdominal pain, N/V/D/C, fever, or chills.  States last alcoholic beverage was nearly 3 months ago.      Past Medical History  Depression/Anxiety  Alcohol Use Disorder  Hypertension  Hyperlipidemia  Marijuana Use     Surgical History  Appendectomy     Social History  He reports that he has been smoking cigarettes. He has never used smokeless tobacco. He reports that he does not currently use alcohol. He reports current drug use. Drug: Marijuana.    Family History  Family History[1]     Allergies  Patient has no known allergies.    Review of Systems  Constitutional: Denies fever, chills, fatigue, weight loss/gain  HEENT: Denies ear ache, sore throat, nasal drainage  Eyes: Denies blurred or double vision  Respiratory: Denies cough, shortness or breath, wheezing  Cardiovascular: Denies chest pain, palpitations, shortness of breath with exertion, edema  GI: Denies abdominal pain, nausea, vomiting, diarrhea, constipation, bloody stools  : Denies urinary burning, urgency, frequency,  hematuria  Musculoskeletal: Denies back, neck, or joint pain; joint redness, swelling, or tenderness  Endocrine: Denies cold/heat intolerance, excessive thirst, excessive hunger  Neuro: Denies dizziness, lightheadedness, seizures, headaches  Psychiatric: See HPI  Skin: Denies wounds, rashes, lesions  Hematologic: Denies easy bruising, easy bleeding, clotting disorder     Physical Exam  Constitutional: A&O x 3; NAD; calm and cooperative  Eyes: EOM's intact  HEENT: Normocephalic, Atraumatic. Oral mucosa moist.   Neck: Supple. No JVD, lymphadenopathy.   Lungs: CTAB with fair air movement. Respirations even and unlabored on room air.   Heart: RRR  Abdomen: Soft, non-tender, non-distended, +BS  MS/Extremities: BECKWITH equally x 4. No edema. Peripheral pulses intact bilaterally.   Neuro: A&O x3; no focal deficits; gross motor and sensation intact. CN II-XII intact.   Skin: Warm and dry. No rashes or lesions  Psych: Normal affect.       Last Recorded Vitals  /90 (BP Location: Right arm, Patient Position: Sitting)   Pulse 69   Temp 37 °C (98.6 °F) (Temporal)   Resp 20   Wt 78.6 kg (173 lb 4.5 oz)   SpO2 99%     Relevant Results  Results for orders placed or performed during the hospital encounter of 04/30/25 (from the past 96 hours)   CBC and Auto Differential   Result Value Ref Range    WBC 10.8 4.4 - 11.3 x10*3/uL    nRBC 0.0 0.0 - 0.0 /100 WBCs    RBC 5.12 4.50 - 5.90 x10*6/uL    Hemoglobin 15.8 13.5 - 17.5 g/dL    Hematocrit 44.2 41.0 - 52.0 %    MCV 86 80 - 100 fL    MCH 30.9 26.0 - 34.0 pg    MCHC 35.7 32.0 - 36.0 g/dL    RDW 12.8 11.5 - 14.5 %    Platelets 303 150 - 450 x10*3/uL    Neutrophils % 72.1 40.0 - 80.0 %    Immature Granulocytes %, Automated 0.2 0.0 - 0.9 %    Lymphocytes % 17.4 13.0 - 44.0 %    Monocytes % 8.6 2.0 - 10.0 %    Eosinophils % 1.3 0.0 - 6.0 %    Basophils % 0.4 0.0 - 2.0 %    Neutrophils Absolute 7.82 (H) 1.20 - 7.70 x10*3/uL    Immature Granulocytes Absolute, Automated 0.02 0.00 - 0.70  x10*3/uL    Lymphocytes Absolute 1.88 1.20 - 4.80 x10*3/uL    Monocytes Absolute 0.93 0.10 - 1.00 x10*3/uL    Eosinophils Absolute 0.14 0.00 - 0.70 x10*3/uL    Basophils Absolute 0.04 0.00 - 0.10 x10*3/uL   Comprehensive Metabolic Panel   Result Value Ref Range    Glucose 131 (H) 74 - 99 mg/dL    Sodium 136 136 - 145 mmol/L    Potassium 3.7 3.5 - 5.3 mmol/L    Chloride 99 98 - 107 mmol/L    Bicarbonate 26 21 - 32 mmol/L    Anion Gap 15 10 - 20 mmol/L    Urea Nitrogen 10 6 - 23 mg/dL    Creatinine 0.92 0.50 - 1.30 mg/dL    eGFR >90 >60 mL/min/1.73m*2    Calcium 9.3 8.6 - 10.3 mg/dL    Albumin 5.0 3.4 - 5.0 g/dL    Alkaline Phosphatase 43 33 - 120 U/L    Total Protein 7.7 6.4 - 8.2 g/dL    AST 29 9 - 39 U/L    Bilirubin, Total 0.5 0.0 - 1.2 mg/dL    ALT 27 10 - 52 U/L   Acute Toxicology Panel, Blood   Result Value Ref Range    Acetaminophen <10.0 10.0 - 30.0 ug/mL    Salicylate  <3 4 - 20 mg/dL    Alcohol <10 <=10 mg/dL   Electrocardiogram, 12-lead   Result Value Ref Range    Ventricular Rate 112 BPM    Atrial Rate 112 BPM    KY Interval 152 ms    QRS Duration 84 ms    QT Interval 334 ms    QTC Calculation(Bazett) 455 ms    P Axis 74 degrees    R Axis 85 degrees    T Axis 63 degrees    QRS Count 18 beats    Q Onset 217 ms    P Onset 141 ms    P Offset 193 ms    T Offset 384 ms    QTC Fredericia 411 ms   Drug Screen, Urine   Result Value Ref Range    Amphetamine Screen, Urine Presumptive Negative Presumptive Negative    Barbiturate Screen, Urine Presumptive Negative Presumptive Negative    Benzodiazepines Screen, Urine Presumptive Negative Presumptive Negative    Cannabinoid Screen, Urine Presumptive Positive (A) Presumptive Negative    Cocaine Metabolite Screen, Urine Presumptive Negative Presumptive Negative    Fentanyl Screen, Urine Presumptive Negative Presumptive Negative    Opiate Screen, Urine Presumptive Negative Presumptive Negative    Oxycodone Screen, Urine Presumptive Negative Presumptive Negative    PCP  Screen, Urine Presumptive Negative Presumptive Negative    Methadone Screen, Urine Presumptive Negative Presumptive Negative      Scheduled medications  Scheduled Medications[2]  Continuous medications  Continuous Medications[3]  PRN medications  PRN Medications[4]   Assessment/Plan   49 y.o. male with a medical history of HTN, ETOH Use, and Depression/Anxiety, who was brought into ED by [facility] office due to bizarre behaviors and suicidal statements. In the ED, consulted for medical management.     Hypertension  -Accelerated in ED  -Resumed home regimen: Amlodipine, Lisinopril   -Will monitor and titrate as needed    Acute Psychosis with Suicide Ideations  -H/O anxiety/depression  -Under the care of Dr. Albarran for management    Alcohol Use Disorder  -Sober x 3 months on 5/7/25    Tobacco and Marijuana Use Disorder  -Cessation advised  -Add Nicotine Patch    Disposition  -Plan of care discussed with nursing staff.   -Discharge per psychiatry.  -> 60 minutes spent in coordination of care, including physical examination, review of chart, and discussion with pertinent staff.      Thank you for the consult. Please call/message via secure chat with medical questions.     Shayla Lee, APRN-CNP             [1]   Family History  Problem Relation Name Age of Onset    Diabetes Mother      Diabetes Father      Hypertension Father      Stroke Maternal Grandfather     [2] amLODIPine, 5 mg, oral, Daily  lisinopril, 10 mg, oral, Daily  nicotine, 1 patch, transdermal, Daily     [3]    [4] PRN medications: acetaminophen, alum-mag hydroxide-simeth, diphenhydrAMINE **OR** diphenhydrAMINE, haloperidol **OR** haloperidol lactate, hydrOXYzine HCL, LORazepam **OR** LORazepam, melatonin, nicotine polacrilex, polyethylene glycol

## 2025-05-01 NOTE — PROGRESS NOTES
This was a signout on April 30 2025 at 2200 hours. Patient is still awaiting a bed assignment. Will sign out to oncoming physician. No complaints overnight, patient slept.     Krista Guerra, DO

## 2025-05-01 NOTE — GROUP NOTE
"Group Topic: Leisure Skills   Group Date: 5/1/2025  Start Time: 1600  End Time: 1715  Facilitators: BOSSMAN Mantilla   Department: [facility] REHAB THERAPY VIRTUAL    Number of Participants: 9   Group Focus: concentration, leisure skills, problem solving, and social skills  Treatment Modality: Recreation Therapy  Interventions utilized were: Ball Toss Trivia (Over/Under), leisure development, mental fitness, and problem solving  Purpose: Leisure Awareness/Education, Social/Cognitive Stimulation, Physical Activity, Teamwork, Pleasurable Activity, communication skills    Name: Roman Ellis YOB: 1975   MR: [id number]      Facilitator: Recreational Therapist  Level of Participation: active  Quality of Participation: appropriate/pleasant, cooperative, engaged, and initiates communication  Interactions with others: loud and appropriate  Mood/Affect: appropriate and elevated   Triggers (if applicable): N/A  Cognition: capable, opinionated   Progress: Minimal  Comments: This session involved a leisure activity called \"Ball Toss Trivia (over/under)\".  The activity involved cognitive tasks, social interactions, healthy competition, physical activity, leisure awareness, and a pleasurable experience. All participants were encouraged to listen to the rules, ask questions as needed, and participate in the activity to the best of their abilities.     Mr. Ellis attended the entire session and completed all group tasks. He was social, slightly elevated, and appeared comfortable in this being his first group on the [facility]. Patient worked well with his teammates and attempted to problem solve each question/answer sharing his thoughts and encouraging others. He was independent with movement tasks.     Plan: patient will be encouraged to complete the RT assessment and attend future unit programming      "

## 2025-05-01 NOTE — GROUP NOTE
"Group Topic: Dialectical Behavioral Therapy - Distress Tolerance   Group Date: 5/1/2025  Start Time: 1400  End Time: 1510  Facilitators: BOSSMAN Mantilla   Department: [facility] REHAB THERAPY VIRTUAL    Number of Participants: 9   Group Focus: acceptance and coping skills  Treatment Modality: Recreation Therapy  Interventions utilized were: DBT-DT-STOP and Turning the Mind skills, clarification, confrontation, exploration, patient education, and support  Purpose: coping skills, maladaptive thinking, and feelings    Name: Roman Ellis YOB: 1975   MR: [id number]      Facilitator: Recreational Therapist  Level of Participation: did not attend, new admission   Progress: None  Comments: This group discussed the S.T.O.P. acronym which is stop, take a step back, observe, and proceed mindfully. Participants were encouraged to share how they may use this skill and when it may be most effective. Group also involved the distress tolerance skill \"turning the mind\" and understanding the step-by-step process to work at acceptance verse rejection. Steps include: observation, making an inner commitment, practice/doing it again, and developing a plan.     Plan: patient will be encouraged to complete the RT assessment and attend future unit programming      "

## 2025-05-01 NOTE — NURSING NOTE
"Patient was admitted this shift. Patient was cooperative with admission. Patient presents as friendly and calm. Patient was assessed sitting in the hallway away from peers for patient's privacy. Patient out on the unit and social with peers this shift. Rated anxiety 0/10 and depression 0/10. Denied SI/HI. Denied auditory/visual/other hallucinations. Patient has been medication and group compliant. No PRN medications given this shift. No complaints of pain or discomfort. Patient's stated goal for today is \"take a shower and put on clean clothes\". Able to state positive coping skills such as \"listen to music and meditation\". Patient has been pleasant and cooperative with staff. Q 15 minute checks to be maintained throughout shift for safety.    "

## 2025-05-01 NOTE — SIGNIFICANT EVENT
Application for Emergency Admission      Ready for Transfer?  Is the patient medically cleared for transfer to inpatient psychiatry: Yes  Has the patient been accepted to an inpatient psychiatric hospital: Yes    Application for Emergency Admission  IN ACCORDANCE WITH [place]  The Chief Clinical Officer of: [facility]  5/1/2025 .9:00 AM    Reason for Hospitalization  The undersigned has reason to believe that: Roman Ellis Is a mentally ill person subject to hospitalization by court order under division B Section 5122.01 of the Revised Code, i.e., this person:    1.Yes  Represents a substantial risk of physical harm to self as manifested by evidence of threats of, or attempts at, suicide or serious self-inflicted bodily harm    2.No Represents a substantial risk of physical harm to others as manifested by evidence of recent homicidal or other violent behavior, evidence of recent threats that place another in reasonable fear of violent behavior and serious physical harm, or other evidence of present dangerousness    3.No Represents a substantial and immediate risk of serious physical impairment or injury to self as manifested by  evidence that the person is unable to provide for and is not providing for the person's basic physical needs because of the person's mental illness and that appropriate provision for those needs cannot be made  immediately available in the community    4.Yes Would benefit from treatment in a hospital for his mental illness and is in need of such treatment as manifested by evidence of behavior that creates a grave and imminent risk to substantial rights of others or  himself.    5.No Would benefit from treatment as manifested by evidence of behavior that indicates all of the following:       (a) The person is unlikely to survive safely in the community without supervision, based on a clinical determination.       (b) The person has a history of lack of compliance  with treatment for mental illness and one of the following applies:      (i) At least twice within the thirty-six months prior to the filing of an affidavit seeking court-ordered treatment of the person under section 5122.111 of the Revised Code, the lack of compliance has been a significant factor in necessitating hospitalization in a hospital or receipt of services in a forensic or other mental health unit of a correctional facility, provided that the thirty-six-month period shall be extended by the length of any hospitalization or incarceration of the person that occurred within the thirty-six-month period.      (ii) Within the forty-eight months prior to the filing of an affidavit seeking court-ordered treatment of the person under section 5122.111 of the Revised Code, the lack of compliance resulted in one or more acts of serious violent behavior toward self or others or threats of, or attempts at, serious physical harm to self or others, provided that the forty-eight-month period shall be extended by the length of any hospitalization or incarceration of the person that occurred within the forty-eight-month period.      (c) The person, as a result of mental illness, is unlikely to voluntarily participate in necessary treatment.       (d) In view of the person's treatment history and current behavior, the person is in need of treatment in order to prevent a relapse or deterioration that would be likely to result in substantial risk of serious harm to the person or others.    (e) Represents a substantial risk of physical harm to self or others if allowed to remain at liberty pending examination.    Therefore, it is requested that said person be admitted to the above named facility.    STATEMENT OF BELIEF    Must be filled out by one of the following: a psychiatrist, licensed physician, licensed clinical psychologist, health or ,  or .  (Statement shall include the circumstances  under which the individual was taken into custody and the reason for the person's belief that hospitalization is necessary. The statement shall also include a reference to efforts made to secure the individual's property at his residence if he was taken into custody there. Every reasonable and appropriate effort should be made to take this person into custody in the least conspicuous manner possible.)    Patient brought in by law enforcement with concern for violence and possible suicidal ideation.  Wife is concerned and called law enforcement.  Patient does have access to guns.    Kae Ambrose MD 5/1/2025     _____________________________________________________________   Place of Employment: [employer]    STATEMENT OF OBSERVATION BY PSYCHIATRIST, LICENSED PHYSICIAN, OR LICENSED CLINICAL PSYCHOLOGIST, IF APPLICABLE    Place of Observation (e.g., [facility] mental health center, general hospital, office, emergency facility)  (If applicable, please complete)    Kae Ambrose MD 5/1/2025    _____________________________________________________________

## 2025-05-02 PROBLEM — F12.20 CANNABIS USE DISORDER, SEVERE: Status: ACTIVE | Noted: 2025-05-02

## 2025-05-02 PROBLEM — Z87.898 HISTORY OF ALCOHOL USE DISORDER: Status: ACTIVE | Noted: 2025-05-02

## 2025-05-02 PROBLEM — F19.91 HISTORY OF ILLICIT DRUG USE: Status: ACTIVE | Noted: 2025-05-02

## 2025-05-02 PROBLEM — F17.200 TOBACCO USE DISORDER: Status: ACTIVE | Noted: 2025-05-02

## 2025-05-02 LAB
25(OH)D3 SERPL-MCNC: 27 NG/ML (ref 30–100)
ATRIAL RATE: 112 BPM
CHOLEST SERPL-MCNC: 209 MG/DL (ref 0–199)
CHOLESTEROL/HDL RATIO: 2.3
GLUCOSE P FAST SERPL-MCNC: 85 MG/DL (ref 74–99)
HDLC SERPL-MCNC: 89 MG/DL
LDLC SERPL CALC-MCNC: 111 MG/DL
NON HDL CHOLESTEROL: 120 MG/DL (ref 0–149)
P AXIS: 74 DEGREES
P OFFSET: 193 MS
P ONSET: 141 MS
PR INTERVAL: 152 MS
Q ONSET: 217 MS
QRS COUNT: 18 BEATS
QRS DURATION: 84 MS
QT INTERVAL: 334 MS
QTC CALCULATION(BAZETT): 455 MS
QTC FREDERICIA: 411 MS
R AXIS: 85 DEGREES
T AXIS: 63 DEGREES
T OFFSET: 384 MS
TRIGL SERPL-MCNC: 45 MG/DL (ref 0–149)
VENTRICULAR RATE: 112 BPM
VLDL: 9 MG/DL (ref 0–40)

## 2025-05-02 PROCEDURE — 82947 ASSAY GLUCOSE BLOOD QUANT: CPT | Performed by: PSYCHIATRY & NEUROLOGY

## 2025-05-02 PROCEDURE — 99222 1ST HOSP IP/OBS MODERATE 55: CPT | Performed by: PSYCHIATRY & NEUROLOGY

## 2025-05-02 PROCEDURE — 2500000001 HC RX 250 WO HCPCS SELF ADMINISTERED DRUGS (ALT 637 FOR MEDICARE OP): Performed by: PSYCHIATRY & NEUROLOGY

## 2025-05-02 PROCEDURE — 97150 GROUP THERAPEUTIC PROCEDURES: CPT | Mod: GO,CO

## 2025-05-02 PROCEDURE — 36415 COLL VENOUS BLD VENIPUNCTURE: CPT | Performed by: PSYCHIATRY & NEUROLOGY

## 2025-05-02 PROCEDURE — 97165 OT EVAL LOW COMPLEX 30 MIN: CPT | Mod: GO

## 2025-05-02 PROCEDURE — 82306 VITAMIN D 25 HYDROXY: CPT | Mod: GEALAB | Performed by: PSYCHIATRY & NEUROLOGY

## 2025-05-02 PROCEDURE — 80061 LIPID PANEL: CPT | Performed by: PSYCHIATRY & NEUROLOGY

## 2025-05-02 PROCEDURE — 1240000001 HC SEMI-PRIVATE BH ROOM DAILY

## 2025-05-02 PROCEDURE — 2500000001 HC RX 250 WO HCPCS SELF ADMINISTERED DRUGS (ALT 637 FOR MEDICARE OP): Performed by: NURSE PRACTITIONER

## 2025-05-02 PROCEDURE — 2500000002 HC RX 250 W HCPCS SELF ADMINISTERED DRUGS (ALT 637 FOR MEDICARE OP, ALT 636 FOR OP/ED): Performed by: PSYCHIATRY & NEUROLOGY

## 2025-05-02 RX ORDER — VENLAFAXINE HYDROCHLORIDE 75 MG/1
75 CAPSULE, EXTENDED RELEASE ORAL DAILY
Status: DISPENSED | OUTPATIENT
Start: 2025-05-02

## 2025-05-02 RX ADMIN — LISINOPRIL 10 MG: 5 TABLET ORAL at 08:19

## 2025-05-02 RX ADMIN — VENLAFAXINE HYDROCHLORIDE 75 MG: 75 CAPSULE, EXTENDED RELEASE ORAL at 09:55

## 2025-05-02 RX ADMIN — AMLODIPINE BESYLATE 5 MG: 5 TABLET ORAL at 08:19

## 2025-05-02 SDOH — SOCIAL STABILITY: SOCIAL INSECURITY: HAVE YOU HAD ANY THOUGHTS OF HARMING ANYONE ELSE?: NO

## 2025-05-02 SDOH — HEALTH STABILITY: MENTAL HEALTH
EXPERIENCED ANY OF THE FOLLOWING LIFE EVENTS: DEATH OF FAMILY/FRIEND;WITNESS TO INJURY TO OR DEATH OF SOMEONE ELSE;PHYSICAL ASSAULT

## 2025-05-02 SDOH — SOCIAL STABILITY: SOCIAL INSECURITY: DO YOU FEEL UNSAFE GOING BACK TO THE PLACE WHERE YOU ARE LIVING?: NO

## 2025-05-02 SDOH — SOCIAL STABILITY: SOCIAL INSECURITY: WERE YOU ABLE TO COMPLETE ALL THE BEHAVIORAL HEALTH SCREENINGS?: YES

## 2025-05-02 SDOH — SOCIAL STABILITY: SOCIAL INSECURITY: HAS ANYONE EVER THREATENED TO HURT YOUR FAMILY OR YOUR PETS?: NO

## 2025-05-02 SDOH — SOCIAL STABILITY: SOCIAL INSECURITY: ARE THERE ANY APPARENT SIGNS OF INJURIES/BEHAVIORS THAT COULD BE RELATED TO ABUSE/NEGLECT?: NO

## 2025-05-02 SDOH — SOCIAL STABILITY: SOCIAL INSECURITY: ARE YOU OR HAVE YOU BEEN THREATENED OR ABUSED PHYSICALLY, EMOTIONALLY, OR SEXUALLY BY ANYONE?: YES

## 2025-05-02 SDOH — SOCIAL STABILITY: SOCIAL INSECURITY: DOES ANYONE TRY TO KEEP YOU FROM HAVING/CONTACTING OTHER FRIENDS OR DOING THINGS OUTSIDE YOUR HOME?: NO

## 2025-05-02 SDOH — SOCIAL STABILITY: SOCIAL INSECURITY: ABUSE: ADULT

## 2025-05-02 SDOH — SOCIAL STABILITY: SOCIAL INSECURITY: DO YOU FEEL ANYONE HAS EXPLOITED OR TAKEN ADVANTAGE OF YOU FINANCIALLY OR OF YOUR PERSONAL PROPERTY?: NO

## 2025-05-02 ASSESSMENT — LIFESTYLE VARIABLES
SUBSTANCE_ABUSE_PAST_12_MONTHS: YES
HOW OFTEN DO YOU HAVE A DRINK CONTAINING ALCOHOL: NEVER
PRESCIPTION_ABUSE_PAST_12_MONTHS: NO
SKIP TO QUESTIONS 9-10: 1
AUDIT-C TOTAL SCORE: 0
AUDIT-C TOTAL SCORE: 0
HOW MANY STANDARD DRINKS CONTAINING ALCOHOL DO YOU HAVE ON A TYPICAL DAY: PATIENT DOES NOT DRINK
HOW OFTEN DO YOU HAVE 6 OR MORE DRINKS ON ONE OCCASION: NEVER

## 2025-05-02 ASSESSMENT — COLUMBIA-SUICIDE SEVERITY RATING SCALE - C-SSRS
1. IN THE PAST MONTH, HAVE YOU WISHED YOU WERE DEAD OR WISHED YOU COULD GO TO SLEEP AND NOT WAKE UP?: NO
1. IN THE PAST MONTH, HAVE YOU WISHED YOU WERE DEAD OR WISHED YOU COULD GO TO SLEEP AND NOT WAKE UP?: NO
6. HAVE YOU EVER DONE ANYTHING, STARTED TO DO ANYTHING, OR PREPARED TO DO ANYTHING TO END YOUR LIFE?: NO
2. HAVE YOU ACTUALLY HAD ANY THOUGHTS OF KILLING YOURSELF?: NO
6. HAVE YOU EVER DONE ANYTHING, STARTED TO DO ANYTHING, OR PREPARED TO DO ANYTHING TO END YOUR LIFE?: NO
2. HAVE YOU ACTUALLY HAD ANY THOUGHTS OF KILLING YOURSELF?: NO

## 2025-05-02 ASSESSMENT — PAIN - FUNCTIONAL ASSESSMENT: PAIN_FUNCTIONAL_ASSESSMENT: 0-10

## 2025-05-02 ASSESSMENT — PAIN SCALES - GENERAL
PAINLEVEL_OUTOF10: 0 - NO PAIN
PAINLEVEL_OUTOF10: 0 - NO PAIN

## 2025-05-02 ASSESSMENT — ACTIVITIES OF DAILY LIVING (ADL): LACK_OF_TRANSPORTATION: NO

## 2025-05-02 NOTE — NURSING NOTE
Patient assessed in his room after he had a lengthy phone conversation with his wife. Patient eager to talk about the situation that let to his admission.  Requesting information on the treatment plan.  Patient discussed in lengths his journey through AA for the past several months and how he smokes THC daily to help with staying clean from alcohol.  Patient does state he has cut down on his daily use as a method of tapering back.  Patient mentions this is an area of concern with his wife and creates many problems in their marriage.  He does deny any anxiety, depression, suicidal ideations, and hallucinations.  States he plays with his kids, draws, listens to music, or meditates as ways to coping skills.  His strengths include his intelligence and gianna and his goal is to make sure his two kids are better men than he is.  Patient declined any PRN medications at this time.  States he will have no issues with sleep.  Encouraged patient to request assistance if he experiences any form of insomnia.  Will continue to monitor.      5/3/25  0520  Patient able to sleep well throgh the night.  No PRN medications administered.   No changes from previous assessmet

## 2025-05-02 NOTE — PROGRESS NOTES
"Occupational Therapy     REHAB Therapy Assessment & Treatment    Patient Name: Roman Ellis  MRN: [id number]  Today's Date: 5/2/2025      Activity Assessment:   Emotional Wellness and Life Balance Group: [id number]  Positive Affirmations and Gratitude Group: [id number]  Leisure Exploration and Team Collaboration Group ([facility]): [id number]    2/3 Groups attended      Following OT eval and in collaboration with the OTR/L, pt attends and participates in all above groups excluding affirmation group as pt with provider at that time. During attended groups, pt initially presents self as demanding/entitled, attention seeking, and initiates sharing aloud throughout. Pt at times, is observed taking over peers when they are sharing, not in a mean way but more to make sure the group hears what he has to say. Pt at one point tells another peer to \"shh\" so that he can have \"5 seconds of meditation\" while he introduces himself to the group. As this same group continues, pt is able to demo improved social interaction and is able to make relatable comments intermittently to select peers. Pt also able to share X1 way he works toward emotional wellbeing stating \"I need to allow myself to feel anger with no judgement and not hurt anyone at the same time\" During leisure group, pt with much improved social interaction and awareness for others during the activity. Pt is with brighter affect, less attention seeking behaviors, and is able to share \"enjoying\" the activity near the end of same group. Pt with gradual progress toward OT goals as evidenced above. Pt would benefit from continued OT services in order to improve overall self-esteem, personal confidence and positive supports for safe transition at discharge.      Encounter Problems       Encounter Problems (Active)       OT Goals       Pt will improve ability to ID and express emotions while accepting and tolerating all emotions, in order to increase awareness of positive emotions. "  (Progressing)       Start:  05/02/25    Expected End:  05/16/25            Pt will demo/ID ways to improve effectiveness in completing tasks and responsibilities, while focusing attention on the present. (Progressing)       Start:  05/02/25    Expected End:  05/16/25            Pt will ID 2-3 effective ways to distract from crisis and ID stressors/ personal symptoms of stress in order to improve management of stress during daily activities.  (Progressing)       Start:  05/02/25    Expected End:  05/16/25            Pt will ID 2 community resources/programs to join/attend after D/C to improve their support system (Progressing)       Start:  05/02/25    Expected End:  05/16/25            Pt will explore and ID 1-2 strategies to manage stressors/symptoms of illness/ grief more effectively prior to discharge.  (Progressing)       Start:  05/02/25    Expected End:  05/16/25                     Additional Comments:  MCNAIR collaborated with patients nurse and charge nurse throughout the day to provide appropriate support and encouragement to attend groups. Pt up on unit when MCNAIR left last group of the day. All needs met.

## 2025-05-02 NOTE — PROGRESS NOTES
" REHAB Therapy Assessment & Treatment    Patient Name: Roman Ellis  MRN: [id number]  Today's Date: 5/2/2025    Activity Assessment:  Initial Assessment  Attention Span: 15-30 Miutes  Cognitive Behavior Status/Orientation: Person, Place, Time, Capable  Crisis Triggers: Emotions, Family/friends, Mood (possible divorce, \"preparing my kids to live in this messed up world\")  Emotional Concerns/Mood/Affect: Alert, Grandiose, Labile, Lability (Crying), Angry/irritable, Cooperative  Hearing: Adequate  Memory: Memory intact  Motivation Level: Minimum encouragement needed  Negative Coping Skills: Substance use (marijuana, ETOH, smoking/chewing, stimulants, \"controling conversations and talking faster than anyone else to do so\", self-sabotage)  Speech/Communication/Socialization: Verbal, Responds when approached, Initiatives conversattion with others (excessive, intrusive)  Vision: Adequate, Glasses  Additional Comments: refer to end of assessment for additional comments    Leisure Survey:  [facility] Leisure Interest Survey  Activity Preference: Group, Independent  Activity Tolerance: Fair 15-30 minutes  At Home ADL Deficits:  (denied any at this time)  Barriers to Leisure Participation: Emotions, Habits, Mood/affect, Personality, Substance use, Thought process, Behaviors (trust issues, divorce \"looming x1 year\", spouse with issues, \"guilt related to feeling good or doing good\")  Community Resources: Active in community groups (\"God\", 2 sons, A.A. meetings)  Creative Activities:  (unable to assess at this time)  Education/School:  (unknown)  Following Directions: Able to follow simple commands  Leisure Interests: Needs to expand leisure interests  Living Arrangement: Spouse, Relative  Motivators for Recreation/Leisure Involvement: Sense of well being/contentment, Fun/entertainment  Outdoor Activities:  (unable to assess at this time)  Passive Games: Educational games, Trivia games  Patient/Family Education Needs:  (Coping " Strategies)  Patient Strengths:  (unknown)  Patient Weakness:  (unknown)  Physial Activity: Fitness/exercise  Social/Group Activities: Clubs/organizations, Oriental orthodox/Scientology activities (A.A. meetings)  Solitary Activities: Watch/listen television, Meditation, Reading, Music  Special Hobbies:  (Coping Skills - meditation, breathing, talking)  Spectator Events: Movies, Sporting events  Transportation: Drives  Work/Volunteer:  (unknown)  Additional Comments: Mr. Ellis was met with during x3 RT sessions and during a 1:1 assessment. The assessment ended prior to its completion so patient could meet with SW and physician. If further assessment is required, it will be completed in the future. Patient has a history of depression, anxiety, HTN, and substance use. He has been experiencing poor sleep, irritability, and agitation. He was admitted for suicidal ideations. Patient shared information about his relationship with his wife. He states that they have had a tumultuous relationship x10 years with discussions of a possible divorce x1. Patient during the assessment was tearful, intense, elevated, and expressing grandiose thoughts. Patient is aware of unit programming and available independent leisure activities. He will be encouraged to continue attending a variety of unit programs.    Therapeutic Recreation:     Encounter Problems       Encounter Problems (Active)       Emotional BH RT       Behaviors (Progressing)       Start:  05/02/25    Expected End:  05/09/25               Mindfulness BH RT       To participate in an activity without judgement (Progressing)       Start:  05/02/25    Expected End:  05/09/25               Physical BH RT       Relaxation (Progressing)       Start:  05/02/25    Expected End:  05/09/25
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
## 2025-05-02 NOTE — CARE PLAN
Phoned wife, Ron, along with Dr. Silva, via speaker phone: wife stated patient shows a lot of symptoms of bipolar; sleeping 3 hours per night, increased energy, restless, not eating, showing rapid, pressured speech;  stated he smokes marijuana every 20 minutes from 4:30 am to 1 AM next morning and she thinks this has changed his personality, thinks he needs to be sober from the weed also, as he has been sober for 3 months on the alcohol; stated weed use has increased since he stopped drinking; stated glad he is not drinking but is worried about this addiction to weed;  Scheduled a family meeting for Monday, 05/05/2025 at 1 PM to talk with patient and wife about discharge plan; want to recommend dual diagnosis IOP/PHP to patient; still stabilizing; sw to follow.

## 2025-05-02 NOTE — CARE PLAN
Problem: Discharge Planning - Care Management  Goal: Discharge to post-acute care or home with appropriate resources  Outcome: Progressing     Problem: Community resource needs  Goal: Patient is receiving increased resource support to enhance ability to remain at home  Outcome: Progressing     Problem: Mental health issues  Goal: Stabilize adverse mental health factors affecting plan of care  Outcome: Progressing     Problem: Access to Care Issue  Goal: Assess and Address Access Barriers  Outcome: Progressing     Problem: Assessment of Caregiver  Goal: Caregiver Demonstrates Personal Health and Wellbeing; as well as Ability to Safely and Effectively Care for Patient  Outcome: Progressing     Problem: Assistance Required for Daily Activities  Goal: Develop a Plan to Assist Patient with Activites of Daily Living  Outcome: Progressing

## 2025-05-02 NOTE — NURSING NOTE
"Patient was assessed in the hallway away from peers for patient's privacy. Patient presents as bright and calm. Patient out on the unit and social with peers this shift. Rated anxiety 0/10 and depression 0/10. Denied SI/HI. Denied auditory/visual/other hallucinations. Patient has been medication and group compliant. No PRN medications requested this shift. No complaints of pain or discomfort. Patient's stated goal for today is \"take a shower and put on my own clothes\". Able to state positive coping skills such as \"meditation, prayer, love, kindness, and blue book from AA\". Patient has been friendly and cooperative with staff. Q 15 minute checks to be maintained throughout shift for safety.    "
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
## 2025-05-02 NOTE — CARE PLAN
"The patient's goals for the shift include \"take a shower and put on my clothes\"    The clinical goals for the shift include maintain safety    Over the shift, the patient did make progress toward the following goals. Barriers to progression include acuteness of illness. Recommendations to address these barriers include educate patient and maintain Q 15 minute rounds for patient safety.      Problem: Sensory Perceptual Alteration as Evidenced by  Goal: Participates in unit activities  Outcome: Progressing     Problem: Sensory Perceptual Alteration as Evidenced by  Goal: Initiates reality-based interactions  Outcome: Progressing     Problem: Ineffective Coping  Goal: Identifies ineffective coping skills  Outcome: Progressing     Problem: Ineffective Coping  Goal: Identifies healthy coping skills  Outcome: Progressing     Problem: Ineffective Coping  Goal: Demonstrates healthy coping skills  Outcome: Progressing     Problem: Anxiety  Goal: Attempts to manage anxiety with help  Outcome: Progressing     Problem: Anxiety  Goal: Verbalizes ways to manage anxiety  Outcome: Progressing     Problem: Anxiety  Goal: Implements measures to reduce anxiety  Outcome: Progressing     Problem: Anxiety  Goal: Free from restraint events  Outcome: Progressing       "

## 2025-05-02 NOTE — GROUP NOTE
Group Topic: Goals   Group Date: 5/2/2025  Start Time: 0730  End Time: 0830  Facilitators: BOSSMAN Mantilla   Department: [facility] REHAB THERAPY VIRTUAL    Number of Participants: 11   Group Focus: check in, daily focus, goals, and personal responsibility  Treatment Modality: Recreation Therapy  Interventions utilized were: Setting Life Goals (handout), clarification, confrontation, exploration, orientation, and support  Purpose: maladaptive thinking, feelings, insight or knowledge, self-worth, and self-care    Name: Roman Ellis YOB: 1975   MR: [id number]      Facilitator: Recreational Therapist  Level of Participation: active  Quality of Participation: cooperative, engaged, and initiates communication  Interactions with others: loud, assertive, excessive at times, and monopolizing  Mood/Affect: elevated, pressured, slightly labile, joking, blunt  Triggers (if applicable): N/A  Cognition: capable, slightly tangential   Progress: Moderate  Comments: Patients were provided a handout that included different areas of life (family/friends, leisure lifestyle, work/school, spirituality, physical, and mental health) and encouraged to think about each area and answer questions. Participants were provided examples and assistance to complete the activity. Questions included: what I’m doing well, where I need improvement, and my goals.     Mr. Ellis attended the entire session. He was vocal and slightly restless/elevated during the discussion. Patient shared his thoughts and opinions about each topic. He shared he's an alcoholic and had marital issues. Patient identified that his ETOH use has impacted his relationships. Patient exhibited some blaming of his partner. Patient was apologetic for his behavior and validated for checking in. Patient was able to complete all desired group tasks.     Plan: patient will be encouraged to complete the RT assessment and attend future group programming

## 2025-05-02 NOTE — PROGRESS NOTES
"Occupational Therapy     REHAB Therapy Assessment & Treatment    Patient Name: Roman Ellis  MRN: [id number]  Today's Date: 5/2/2025      Time In: 0847 Time Out: 0858  Date of OT Referral: 5/2/2025   Admission Diagnosis: depression with SI   Reason for Referral: psych  General Information   Past Medical History/History of Present Illness: HTN, ETOH Use, and Depression/Anxiety    Pain: 0/10   Precautions: none   Appearance: pt eating breakfast conversing with peers in common area upon arrival to evaluation   Initial Response to Eval: pt receptive to information provided but states \"there's nothing that can help me here\". Verbalizes willingness to participate in OT POC and group activities   Current Stressors: difficulty with marriage  Personal History   Marital Status: , states he and his wife have been having significant difficulties with communication and states \"I'm only here because my wife probated me, it's her who needs to accept who I am\"   Community Support: Attends AA meetings, enjoys them and reports they help significantly   Support System: good familial supports   Living Situation: at home with 2 kids, wife   DME Used and/or Owned: none   Prior Level of Function: independent   Level of Education: high school   Employment Status: works at [employer], has hx of being in  but was kicked out for using drugs   Leisure Interests: spending time with kids  Psychosocial/Cognition Assessment   Orientation: AOx4   Attention: Pt hyper verbal but able to stay on task through duration of evaluation   Follows Commands: WFL   Mood: impaired, states he is \"very passionate in what I talk about, my wife thinks I'm manic but I'm not\"   Safety Awareness: impaired as evidenced by admission reason    Patient Identified Life Roles: is very passionate about role as a father, \"I want them to have a better life than I had growing up\"   Patient Identified Goals-Short Term: go home, attend groups     Long " "Term:salvage marriage, provide for kids  Perceptual/Communication Skills   Speech (dysarthric, exp/rec aphasia, pressured, etc.): pt with rapid speech, passionate about explaining situation at home   Vision: WFL   Perception (inattention, delusions, hallucinations, suicidal, etc): WFL   Reality Based Behavior: WFL   Perseverations: believes wife needs to accept current version of himself, \"I use marijuana to help combat the alcohol addiction I had, and my wife is un accepting of that\"    Social Skills/Behavior: pt friendly, conversational, makes good eye contact. Asks/answers questions appropriately. Excited to participate in group activities  Basic Functional Mobility   Device Used: none   Bed Mobility: independent   Transfer Status: independent   Ambulatory Status: independent   Balance: WFL   Endurance: WFL  Activities of Daily Living   Grooming/Hygiene: independent   Dressing: independent   Bathing: independent   Toileting: independent   Sleep: WFL  Instrumental Activities of Daily Living   Medication Management/Compliance: unable to identify medication routine   Managing Finances: WFL   Patient Reported Daily Routine/Responsibility: able to identify components of daily routine including ADLs, IADLs, childcare   Emotional/Mental Health Management   Patient Identified Coping Skills- Positive: spending time with kids      Negative: marijuana   Knowledge of Illness/Emotions: pt lacks insight into responsibilities of actions, believes his wife is to blame for communication issues. States \"these groups aren't going to help me, they aren't as advanced as I neef them to be\"   Stress Management: Maximally impaired, would benefit from exploration of stress management techniques and coping skills   Anger/Frustration Management: impaired   Depression/Anxiety Management: Maximally impaired, would benefit from exploration of stress management techniques and coping skills   Relapse Prevention Skills/Supports: Pt would benefit " from exploration of community resources to use for post-discharge carryover of learned skills.          Encounter Problems       Encounter Problems (Active)       OT Goals       Pt will improve ability to ID and express emotions while accepting and tolerating all emotions, in order to increase awareness of positive emotions.        Start:  05/02/25    Expected End:  05/16/25            Pt will demo/ID ways to improve effectiveness in completing tasks and responsibilities, while focusing attention on the present.       Start:  05/02/25    Expected End:  05/16/25            Pt will ID 2-3 effective ways to distract from crisis and ID stressors/ personal symptoms of stress in order to improve management of stress during daily activities.        Start:  05/02/25    Expected End:  05/16/25            Pt will ID 2 community resources/programs to join/attend after D/C to improve their support system       Start:  05/02/25    Expected End:  05/16/25            Pt will explore and ID 1-2 strategies to manage stressors/symptoms of illness/ grief more effectively prior to discharge.        Start:  05/02/25    Expected End:  05/16/25                     Education Documentation  No documentation found.  Education Comments  No comments found.          Additional Comments:

## 2025-05-02 NOTE — GROUP NOTE
"Group Topic: Personal Responsibility   Group Date: 5/2/2025  Start Time: 1400  End Time: 1500  Facilitators: BOSSMAN Mantilla   Department: [facility] REHAB THERAPY VIRTUAL    Number of Participants: 9   Group Focus: communication and personal responsibility  Treatment Modality: Recreation Therapy  Interventions utilized were: Living Skills-Interpersonal Skills, Communication, Conflict Resolution, Maintaining Relationships, patient education, reminiscence, story telling, and support  Purpose: communication skills and insight or knowledge    Name: Roman Ellis YOB: 1975   MR: [id number]      Facilitator: Recreational Therapist  Level of Participation: active  Quality of Participation: cooperative, engaged, impulsive, and initiates communication  Interactions with others: load/boisterous, gave feedback, and intrusive  Mood/Affect: labile and elevated  Triggers (if applicable): N/A  Cognition: grandiose, capable with encouragement  Progress: Moderate  Comments: We watched a video, had dialogue, and reviewed handouts around the living skill of communication.  The handouts included:  positive communication skills, healthy steps to conflict resolution, building/maintaining relationships, and identifying what you have learned in relationships.  The video included educational information and the personal sharing of individuals both in recovery and assisting a recovery process.     Mr. Ellis attended most of the session. While in attendance he speaks his thoughts frequently and with authority. Patient expressed being aware of his less effective communication techniques. He also discussed \"being himself\" and the importance of \"loving oneself\". Patient also talked about not caring what others perceive. He expressed that he understood the information and importance of effective communication.     Plan: continue with services      "

## 2025-05-02 NOTE — CARE PLAN
"The patient's goals for the shift include \"Make sure my 2 boys are better men that I am\"    The clinical goals for the shift include Medication compliance and Safety    Over the shift, the patient did not make progress toward the following goals. Barriers to progression include acuity of illness. Recommendations to address these barriers include medication compliance, safety, and education.    "

## 2025-05-03 PROCEDURE — 1240000001 HC SEMI-PRIVATE BH ROOM DAILY

## 2025-05-03 PROCEDURE — 2500000001 HC RX 250 WO HCPCS SELF ADMINISTERED DRUGS (ALT 637 FOR MEDICARE OP): Performed by: PSYCHIATRY & NEUROLOGY

## 2025-05-03 PROCEDURE — 2500000001 HC RX 250 WO HCPCS SELF ADMINISTERED DRUGS (ALT 637 FOR MEDICARE OP): Performed by: NURSE PRACTITIONER

## 2025-05-03 PROCEDURE — 2500000002 HC RX 250 W HCPCS SELF ADMINISTERED DRUGS (ALT 637 FOR MEDICARE OP, ALT 636 FOR OP/ED): Performed by: PSYCHIATRY & NEUROLOGY

## 2025-05-03 PROCEDURE — 99232 SBSQ HOSP IP/OBS MODERATE 35: CPT | Performed by: PSYCHIATRY & NEUROLOGY

## 2025-05-03 RX ADMIN — VENLAFAXINE HYDROCHLORIDE 75 MG: 75 CAPSULE, EXTENDED RELEASE ORAL at 08:31

## 2025-05-03 RX ADMIN — LISINOPRIL 10 MG: 5 TABLET ORAL at 08:30

## 2025-05-03 RX ADMIN — AMLODIPINE BESYLATE 5 MG: 5 TABLET ORAL at 08:30

## 2025-05-03 ASSESSMENT — COLUMBIA-SUICIDE SEVERITY RATING SCALE - C-SSRS
6. HAVE YOU EVER DONE ANYTHING, STARTED TO DO ANYTHING, OR PREPARED TO DO ANYTHING TO END YOUR LIFE?: NO
2. HAVE YOU ACTUALLY HAD ANY THOUGHTS OF KILLING YOURSELF?: NO
1. IN THE PAST MONTH, HAVE YOU WISHED YOU WERE DEAD OR WISHED YOU COULD GO TO SLEEP AND NOT WAKE UP?: NO
6. HAVE YOU EVER DONE ANYTHING, STARTED TO DO ANYTHING, OR PREPARED TO DO ANYTHING TO END YOUR LIFE?: NO
2. HAVE YOU ACTUALLY HAD ANY THOUGHTS OF KILLING YOURSELF?: NO
1. IN THE PAST MONTH, HAVE YOU WISHED YOU WERE DEAD OR WISHED YOU COULD GO TO SLEEP AND NOT WAKE UP?: NO

## 2025-05-03 ASSESSMENT — PAIN SCALES - GENERAL
PAINLEVEL_OUTOF10: 0 - NO PAIN
PAINLEVEL_OUTOF10: 0 - NO PAIN

## 2025-05-03 ASSESSMENT — PAIN - FUNCTIONAL ASSESSMENT: PAIN_FUNCTIONAL_ASSESSMENT: 0-10

## 2025-05-03 NOTE — PROGRESS NOTES
"Roman Ellis is a 49 y.o. male on day 2 of admission presenting with Depression with suicidal ideation.    The patient was seen and examined. I reviewed the chart and vital signs from overnight. I reviewed previous notes. I reviewed medications, administered overnight and their reported benefits or side effects. Patient reported by nursing to have slept 8UB. I spoke with the patient this am, at the end of the hallway, in the presence of his nurse. Roman said he talked to his wife this am, stated \"she is allowing me to smoke weed, a little\". He said he would prefer not to be reliant on mental health medication. He said he has signed off on alcohol, hard drugs \"but not cannabis\". He gave reasons, excused, to keep using, could not identify why he is needing to use.   Patient denied drug side effects. The patient's nurse this morning reported, overnight, no agitated behavioral disturbances. I shared that the family phone meeting is to talk about the cannabis use, treatment op recs. One done, may be ready for dc home.      Mental Status Exam:   General: appropriately groomed and dressed in hospital attire.  Appearance: appears stated age.  Attitude: cooperative.  Behavior: appropriate eye contact, very pleasant  Motor Activity: no agitation or retardation. No EPS/TD, normal gait and station, normal muscle tone and bulk.  Speech: regular rate, rhythm, volume and tone, spontaneous, fluent, non-pressured.  Mood: denied depression, anxiety  Affect: animated, bright can be loud at times  Thought Process: organized, and goal directed.  Thought Content: does not currently endorse suicidal ideation,  denies homicidal ideations, no delusions elicited   Thought Perception: does not endorse auditory hallucinations, denies visual hallucinations, no tactile, olfactory, or gustatory hallucinations elicited.   Cognition: alert, oriented to person, place and time, adequate fund of knowledge, no deficit in recent and remote memory, no " deficits in attention, concentration or language.  Insight: fair, as patient recognizes symptoms of  illness and need for recommended treatments.   Judgment: fair, as patient can make reasonable decisions about ordinary activities of daily living and necessary medical care recommendations.      Objective:        5/1/2025     8:21 PM 5/2/2025     7:28 AM 5/2/2025     7:30 AM 5/2/2025     7:50 PM 5/2/2025     7:51 PM 5/3/2025     7:55 AM 5/3/2025     7:56 AM   Vitals   Systolic 137 147 156 139 156 143 156   Diastolic 77 96 92 79 92 95 97   BP Location Right arm Left arm  Right arm Right arm Left arm    Heart Rate 82 77 82 77 90 75 82   Temp  36.4 °C (97.5 °F)  36.3 °C (97.3 °F)  36.7 °C (98.1 °F)    Resp 16   16 16           Labs:  Results for orders placed or performed during the hospital encounter of 05/01/25 (from the past 96 hours)   Glucose, Fasting   Result Value Ref Range    Glucose, Fasting 85 74 - 99 mg/dL   Lipid Panel   Result Value Ref Range    Cholesterol 209 (H) 0 - 199 mg/dL    HDL-Cholesterol 89.0 mg/dL    Cholesterol/HDL Ratio 2.3     LDL Calculated 111 (H) <=99 mg/dL    VLDL 9 0 - 40 mg/dL    Triglycerides 45 0 - 149 mg/dL    Non HDL Cholesterol 120 0 - 149 mg/dL   Vitamin D 25-Hydroxy,Total (for eval of Vitamin D levels)   Result Value Ref Range    Vitamin D, 25-Hydroxy, Total 27 (L) 30 - 100 ng/mL          Assessment & Plan  Depression with suicidal ideation  Plan: 1) Venlafaxine XR 75mg Qdaily           2) Group and milieu therapy   Anxiety  Plan: 1) See above   Cannabis use disorder, severe  Plan: 1) Refer to outpatient treatment program   History of illicit drug use  Plan: 1) Refer to outpatient program            2) Continue to monitor   History of alcohol use disorder  Plan: 1) Continue AA   Tobacco use disorder  Plan: 1) Nicoderm patch as needed  Hypertension  Plan: 1) IM service to follow and manage      Family meeting Monday on phone, once done plan to dc home      I spent 40 minutes in the  professional and overall care of this patient.    Patient was seen and examined today. Time spent was in review of events and medications given overnight, both pending and completed lab work, consults pending/completed, medication prescribed (discussion of class, reason for recommendation, estimated onset of action, risk and benefits, group therapy notes, ot notes, vital signs, pcos (if appropriate), behaviors, nursing report, nursing assessments, plan of care, discharge plan.      Lorri Silva MD

## 2025-05-03 NOTE — NURSING NOTE
"0800- Pt was assessed in the hallway away from peers for privacy. Pt was very polite and friendly, pt was bright and out on the unit social with peers. Pt was out for breakfast this am. Pt denied all anxiety, depression, SI/HI, AVH and pain. Pt stated his goal is \"wait for my visit for my wife,\" his coping skill is \"AA tool bar,\" and his strength is \"helping people.\" Pt refused buffy patch as he stated \"I am using this stay to quit cold turkey.\" Pt was otherwise medication compliant.       1730- Pt had a good day, pt attended groups and was out social with peers. Pt is currently sitting in the front lounge without any obvious signs or symptoms of distress. No new orders to carry out at this time. Q15 minute safety checks maintained.   "

## 2025-05-03 NOTE — GROUP NOTE
Group Topic: Open Recreation   Group Date: 5/3/2025  Start Time: 1600  End Time: 1710  Facilitators: BOSSMAN Hayward   Department: [facility] REHAB THERAPY VIRTUAL    Number of Participants: 11   Group Focus: leisure skills and social skills  Treatment Modality: Recreational Therapy   Interventions utilized were Open Recreation/Guardians Game, exploration, leisure development, and support  Purpose: other: leisure awareness, social engagement    Name: Roman Ellis YOB: 1975   MR: [id number]      Facilitator: Recreational Therapist  Level of Participation: active  Quality of Participation: appropriate/pleasant, cooperative, and engaged  Interactions with others: appropriate  Mood/Affect: appropriate and positive  Triggers (if applicable): n/a  Cognition: coherent/clear and goal directed  Progress: Moderate  Comments: Patients were gathered in the group area and encouraged to engage in a leisure activity. Options included board games, puzzles, cards, word searches/crosswords, coloring, and song choices.  This group offers an opportunity to work on cognitive skills, promotes positive social interaction, and leisure awareness.    Pt was pleasant, social, and engaged with peers appropriately. He spent time walking the unit, listening to music, and talking 1:1 with CTRS.     Plan: continue with services

## 2025-05-03 NOTE — GROUP NOTE
"Group Topic: Leisure Skills   Group Date: 5/3/2025  Start Time: 0930  End Time: 1050  Facilitators: PHILLIP HaywardS   Department: [facility] REHAB THERAPY VIRTUAL    Number of Participants: 10   Group Focus: coping skills, dual diagnosis, goals, leisure skills, and social skills  Treatment Modality: Recreational Therapy   Interventions utilized were Leisure, Are You Complete?, exploration, leisure development, mental fitness, patient education, story telling, and support  Purpose: leisure awareness, coping skills, communication skills, insight or knowledge, self-worth, and self-care    Name: Roman Ellis YOB: 1975   MR: [id number]      Facilitator: Recreational Therapist  Level of Participation: active  Quality of Participation: appropriate/pleasant, cooperative, and engaged  Interactions with others: appropriate, gave feedback, and offered helpful suggestions  Mood/Affect: appropriate and positive  Triggers (if applicable): n/a  Cognition: coherent/clear, goal directed, and insightful  Progress: Moderate  Comments: Patients were provided with the “Leisure, Are You Complete?” handout. We discussed the importance of having a variety of leisure interests to be well-rounded in our leisure time. The areas included (improvement, pleasure, socialization, identification, escape, creativity, consumption, spiritual, and fitness). Patients were encouraged to record up to three leisure activities in each area. Patients were also asked to identify their two strongest areas and two areas they could improve upon.    Pt was pleasant, social, and engaged this morning. He shared openly and frequently, often speaking over peers/CTRS, but able to redirect when necessary. Pt very open about his addiction issues, past trauma, and struggles. Pt identified a long-term goals of becoming and addiction/recovery counselor and \"being the best father I can be\".     Plan: continue with services      "

## 2025-05-03 NOTE — CARE PLAN
"Roman had an uneventful night. He denies anxiety, depression, SI/HI, and all hallucinations. He stated his strengths as \"my AA toolbox.\" He is proud to be almost 3 months sober and states AA has helped him tremendously. He states his strengths as \"I am intelligent and charismatic.\" He stated his short-term goal as \"continue to help the people around feel better.\" He stated his long-term goal as \"making sure my children grow up to be a better man than I am.\" He had no medications this shift. Social with peers. Out on the unit. Pleasant and cooperative.  " Attending Attestation (For Attendings USE Only)...

## 2025-05-03 NOTE — CARE PLAN
The patient's goals for the shift include Wait for my visit    The clinical goals for the shift include maintain safety      Problem: Sensory Perceptual Alteration as Evidenced by  Goal: Patient/Family participate in treatment and discharge plans  Outcome: Progressing  Goal: Patient/Family verbalizes awareness of resources  Outcome: Progressing  Goal: Participates in unit activities  Outcome: Progressing  Goal: Discusses signs/symptoms of illness/treatment options  Outcome: Progressing  Goal: Initiates reality-based interactions  Outcome: Progressing  Goal: Able to discuss content of hallucinations/delusions  Outcome: Progressing  Goal: Notifies staff when experiencing hallucinations/delusions  Outcome: Progressing  Goal: Verbalizes reduction in hallucinations/delusions  Outcome: Progressing  Goal: Will not act on psychotic perception  Outcome: Progressing  Goal: Understands least restrictive measures  Outcome: Progressing  Goal: Free from restraint events  Outcome: Progressing

## 2025-05-03 NOTE — GROUP NOTE
"Group Topic: Leisure Skills   Group Date: 5/3/2025  Start Time: 1400  End Time: 1500  Facilitators: BOSSMAN Hayward   Department: [facility] REHAB THERAPY VIRTUAL    Number of Participants: 9   Group Focus: leisure skills, problem solving, and social skills  Treatment Modality: Recreational Therapy   Interventions utilized were Confident?, exploration, leisure development, and mental fitness  Purpose: other: cognitive skills/focus, team/healthy competition, leisure awareness     Name: Roman Ellis YOB: 1975   MR: [id number]      Facilitator: Recreational Therapist  Level of Participation: active  Quality of Participation: appropriate/pleasant, cooperative, and engaged  Interactions with others: appropriate and gave feedback  Mood/Affect: appropriate and positive  Triggers (if applicable): n/a  Cognition: coherent/clear and goal directed  Progress: Moderate  Comments: Patients were gathered to learn and participate in a game called \"Confident?\". This activity works on cognitive skills, following directions, positive social interaction/teamwork, and promotes leisure awareness.    Pt was pleasant, social, and engaged appropriately in group task. Pt can be loud and overbearing at times, but able to be redirected.     Plan: continue with services      "

## 2025-05-03 NOTE — CARE PLAN
"The patient's goals for the shift include \"continue to help the people around me here.\"    The clinical goals for the shift include pt will remain safe, pleasant, and cooperative, and pt will sleep >6 hours this shift    "

## 2025-05-04 VITALS
RESPIRATION RATE: 16 BRPM | BODY MASS INDEX: 25.67 KG/M2 | SYSTOLIC BLOOD PRESSURE: 122 MMHG | WEIGHT: 173.28 LBS | HEART RATE: 85 BPM | DIASTOLIC BLOOD PRESSURE: 87 MMHG | OXYGEN SATURATION: 98 % | TEMPERATURE: 97 F | HEIGHT: 69 IN

## 2025-05-04 PROCEDURE — 2500000002 HC RX 250 W HCPCS SELF ADMINISTERED DRUGS (ALT 637 FOR MEDICARE OP, ALT 636 FOR OP/ED): Performed by: PSYCHIATRY & NEUROLOGY

## 2025-05-04 PROCEDURE — 99233 SBSQ HOSP IP/OBS HIGH 50: CPT | Performed by: PSYCHIATRY & NEUROLOGY

## 2025-05-04 PROCEDURE — 1240000001 HC SEMI-PRIVATE BH ROOM DAILY

## 2025-05-04 PROCEDURE — 2500000001 HC RX 250 WO HCPCS SELF ADMINISTERED DRUGS (ALT 637 FOR MEDICARE OP): Performed by: PSYCHIATRY & NEUROLOGY

## 2025-05-04 PROCEDURE — 2500000001 HC RX 250 WO HCPCS SELF ADMINISTERED DRUGS (ALT 637 FOR MEDICARE OP): Performed by: NURSE PRACTITIONER

## 2025-05-04 RX ADMIN — LISINOPRIL 10 MG: 5 TABLET ORAL at 08:08

## 2025-05-04 RX ADMIN — VENLAFAXINE HYDROCHLORIDE 75 MG: 75 CAPSULE, EXTENDED RELEASE ORAL at 08:08

## 2025-05-04 RX ADMIN — AMLODIPINE BESYLATE 5 MG: 5 TABLET ORAL at 08:08

## 2025-05-04 ASSESSMENT — COLUMBIA-SUICIDE SEVERITY RATING SCALE - C-SSRS
2. HAVE YOU ACTUALLY HAD ANY THOUGHTS OF KILLING YOURSELF?: NO
1. IN THE PAST MONTH, HAVE YOU WISHED YOU WERE DEAD OR WISHED YOU COULD GO TO SLEEP AND NOT WAKE UP?: NO
1. IN THE PAST MONTH, HAVE YOU WISHED YOU WERE DEAD OR WISHED YOU COULD GO TO SLEEP AND NOT WAKE UP?: NO
2. HAVE YOU ACTUALLY HAD ANY THOUGHTS OF KILLING YOURSELF?: NO
6. HAVE YOU EVER DONE ANYTHING, STARTED TO DO ANYTHING, OR PREPARED TO DO ANYTHING TO END YOUR LIFE?: NO
6. HAVE YOU EVER DONE ANYTHING, STARTED TO DO ANYTHING, OR PREPARED TO DO ANYTHING TO END YOUR LIFE?: NO

## 2025-05-04 ASSESSMENT — PAIN SCALES - GENERAL
PAINLEVEL_OUTOF10: 0 - NO PAIN
PAINLEVEL_OUTOF10: 0 - NO PAIN

## 2025-05-04 ASSESSMENT — PAIN - FUNCTIONAL ASSESSMENT: PAIN_FUNCTIONAL_ASSESSMENT: 0-10

## 2025-05-04 NOTE — PROGRESS NOTES
Roman Ellis is a 49 y.o. male on day 3 of admission presenting with Depression with suicidal ideation.    The patient was seen and examined. I reviewed the chart and vital signs from overnight. I reviewed previous notes. I reviewed medications, administered overnight and their reported benefits or side effects. Patient reported by nursing to have slept 8UB. I spoke with the patient this am, at the end of the hallway, in the presence of his nurse. Roman said he is feeling good without use. Patient still did not seem decided on abstinence. Working on coping skills.      Mental Status Exam:   General: appropriately groomed and dressed in hospital attire.  Appearance: appears stated age.  Attitude: cooperative.  Behavior: appropriate eye contact, very pleasant  Motor Activity: no agitation or retardation. No EPS/TD, normal gait and station, normal muscle tone and bulk.  Speech: regular rate, rhythm, volume and tone, spontaneous, fluent, non-pressured.  Mood: denied depression, anxiety  Affect: animated, bright can be loud at times  Thought Process: organized, and goal directed.  Thought Content: does not currently endorse suicidal ideation,  denies homicidal ideations, no delusions elicited   Thought Perception: does not endorse auditory hallucinations, denies visual hallucinations, no tactile, olfactory, or gustatory hallucinations elicited.   Cognition: alert, oriented to person, place and time, adequate fund of knowledge, no deficit in recent and remote memory, no deficits in attention, concentration or language.  Insight: fair, as patient recognizes symptoms of  illness and need for recommended treatments.   Judgment: fair, as patient can make reasonable decisions about ordinary activities of daily living and necessary medical care recommendations.      Objective:        5/2/2025     7:51 PM 5/3/2025     7:55 AM 5/3/2025     7:56 AM 5/3/2025     7:00 PM 5/4/2025     6:51 AM 5/4/2025     6:52 AM 5/4/2025     8:08 AM    Vitals   Systolic 156 143 156 182 123 119 135   Diastolic 92 95 97 96 70 80 85   BP Location Right arm Left arm   Right arm Right arm    Heart Rate 90 75 82 63 68 77 82   Temp  36.7 °C (98.1 °F)  36.3 °C (97.3 °F) 36 °C (96.8 °F)     Resp 16               Labs:  Results for orders placed or performed during the hospital encounter of 05/01/25 (from the past 96 hours)   Glucose, Fasting   Result Value Ref Range    Glucose, Fasting 85 74 - 99 mg/dL   Lipid Panel   Result Value Ref Range    Cholesterol 209 (H) 0 - 199 mg/dL    HDL-Cholesterol 89.0 mg/dL    Cholesterol/HDL Ratio 2.3     LDL Calculated 111 (H) <=99 mg/dL    VLDL 9 0 - 40 mg/dL    Triglycerides 45 0 - 149 mg/dL    Non HDL Cholesterol 120 0 - 149 mg/dL   Vitamin D 25-Hydroxy,Total (for eval of Vitamin D levels)   Result Value Ref Range    Vitamin D, 25-Hydroxy, Total 27 (L) 30 - 100 ng/mL        Assessment & Plan  Depression with suicidal ideation  Plan: 1) Venlafaxine XR 75mg Qdaily           2) Group and milieu therapy   Anxiety  Plan: 1) See above   Cannabis use disorder, severe  Plan: 1) Refer to outpatient treatment program   History of illicit drug use  Plan: 1) Refer to outpatient program            2) Continue to monitor   History of alcohol use disorder  Plan: 1) Continue AA   Tobacco use disorder  Plan: 1) Nicoderm patch as needed  Hypertension  Plan: 1) IM service to follow and manage      Family meeting Monday on phone, once done plan to dc home      I spent 40 minutes in the professional and overall care of this patient.    Patient was seen and examined today. Time spent was in review of events and medications given overnight, both pending and completed lab work, consults pending/completed, medication prescribed (discussion of class, reason for recommendation, estimated onset of action, risk and benefits, group therapy notes, ot notes, vital signs, pcos (if appropriate), behaviors, nursing report, nursing assessments, plan of care, discharge  pb.      Lorri Silva MD

## 2025-05-04 NOTE — GROUP NOTE
Group Topic: Art Creative   Group Date: 5/4/2025  Start Time: 1600  End Time: 1715  Facilitators: PHILLIP HaywardS   Department: [facility] REHAB THERAPY VIRTUAL    Number of Participants: 11   Group Focus: leisure skills and social skills  Treatment Modality: Recreational Therapy   Interventions utilized were Acrylic Painting/Ceramics, Music, exploration and leisure development  Purpose: other: creative expression, leisure awareness, social engagement     Name: Roman Ellis YOB: 1975   MR: [id number]      Facilitator: Recreational Therapist  Level of Participation: active  Quality of Participation: appropriate/pleasant, cooperative, and engaged  Interactions with others: appropriate  Mood/Affect: appropriate and positive  Triggers (if applicable): n/a  Cognition: coherent/clear and goal directed  Progress: Moderate  Comments: Patients were gathered to participate in Acrylic Painting (Ceramics). This activity works on creative expression, fine motor skills, following directions, positive social interaction, and leisure awareness.    Pt politely declined to participate in creative task, but sat at separate table and talked 1:1 with CTRS for majority of session.     Plan: continue with services

## 2025-05-04 NOTE — GROUP NOTE
"Group Topic: Community   Group Date: 5/4/2025  Start Time: 1100  End Time: 1200  Facilitators: PHILLIP HaywardS   Department: [facility] REHAB THERAPY VIRTUAL    Number of Participants: 10   Group Focus: community group, leisure skills, and social skills  Treatment Modality: Recreational Therapy   Interventions utilized were Community Meeting, That's It, exploration, leisure development, and support  Purpose: other: community meeting/support, social interaction, leisure awareness    Name: Roman Ellis YOB: 1975   MR: [id number]      Facilitator: Recreational Therapist  Level of Participation: active  Quality of Participation: appropriate/pleasant, cooperative, and engaged  Interactions with others: appropriate and gave feedback  Mood/Affect: appropriate and positive  Triggers (if applicable): n/a  Cognition: coherent/clear and goal directed  Progress: Moderate  Comments: This session included providing participants an opportunity to understand unit guidelines, rules, expectations, and provide a healthy environment to give suggestions for unit improvements. CTRS prioritized suggestions to discuss during session through an activity based intervention (\"That's it\"). Community Meeting questionnaire slips were passed out and collected.    Pt was cooperative, pleasant, and engaged during session. He provided feedback when prompted and appeared to enjoy the friendly competition of the game.     Plan: continue with services      "

## 2025-05-04 NOTE — NURSING NOTE
"0930- Pt was assessed in the front lounge, pt was pleasant and cooperative. Pt denied anxiety, depression, SI/HI, AVH and pain. Pt stated his goal is \"enjoy my visit,\" his coping skill is \"AA,\" and his strength is \"making people smile.\" Pt has been out on the unit socializing with peers and watching TV. Pt refused buffy patch, but was otherwise medication compliant.     1700- Pt is out in the front lounge socializing with peers without any obvious physical signs or symptoms of distress. No new orders to carry out at this time. Q15 minute safety checks maintained.   "

## 2025-05-04 NOTE — CARE PLAN
"The patient's goals for the shift include \"sleep through tonight\"    The clinical goals for the shift include maintain safety      Problem: Sensory Perceptual Alteration as Evidenced by  Goal: Patient/Family participate in treatment and discharge plans  Outcome: Progressing  Goal: Patient/Family verbalizes awareness of resources  Outcome: Progressing  Goal: Participates in unit activities  Outcome: Progressing  Goal: Discusses signs/symptoms of illness/treatment options  Outcome: Progressing  Goal: Initiates reality-based interactions  Outcome: Progressing  Goal: Able to discuss content of hallucinations/delusions  Outcome: Progressing  Goal: Notifies staff when experiencing hallucinations/delusions  Outcome: Progressing  Goal: Verbalizes reduction in hallucinations/delusions  Outcome: Progressing  Goal: Will not act on psychotic perception  Outcome: Progressing  Goal: Understands least restrictive measures  Outcome: Progressing  Goal: Free from restraint events  Outcome: Progressing     "

## 2025-05-04 NOTE — CARE PLAN
"The patient's goals for the shift include \"sleep through tonight\"    The clinical goals for the shift include Maintain safety    Over the shift, the patient did make progress toward the following goals.   Problem: Altered Thought Processes as Evidenced by  Goal: STG - Desires improvement in ability to think and concentrate  Outcome: Progressing  Goal: STG - Participates in Occupational Therapy and other cognitive assessments  Outcome: Progressing     Problem: Potential for Harm to Self or Others  Goal: Participates in unit activities  Outcome: Progressing  Goal: Patient/Family participate in treatment and discharge plans  Outcome: Progressing  Goal: Identifies deescalation techniques  Outcome: Progressing  Goal: Understands least restrictive measures  Outcome: Progressing  Goal: Identifies stressors that lead to harmful behaviors  Outcome: Progressing  Goal: Notifies staff when experiencing harmful thoughts toward self/others  Outcome: Progressing  Goal: Denies harm toward self or others  Outcome: Progressing  Goal: Free from restraint events  Outcome: Progressing     Problem: Alteration in Sleep  Goal: STG - Reports nightly sleep, duration, and quality  Outcome: Progressing  Goal: STG - Identifies sleep hygiene aids  Outcome: Progressing  Goal: STG - Informs staff if unable to sleep  Outcome: Progressing  Goal: STG - Attends breathing and relaxation group  Outcome: Progressing     "

## 2025-05-04 NOTE — GROUP NOTE
"Group Topic: Dialectical Behavioral Therapy - Distress Tolerance   Group Date: 5/4/2025  Start Time: 0930  End Time: 1030  Facilitators: BOSSMAN Hayward   Department: [facility] REHAB THERAPY VIRTUAL    Number of Participants: 10   Group Focus: coping skills, mindfulness, and relaxation  Treatment Modality: Recreational Therapy  Interventions utilized were DBT - Self-Soothing, Guided Meditation, exploration, reminiscence, story telling, and support  Purpose: coping skills, feelings, and insight or knowledge    Name: Roman Ellis YOB: 1975   MR: [id number]      Facilitator: Recreational Therapist  Level of Participation: active  Quality of Participation: appropriate/pleasant, cooperative, and engaged  Interactions with others: appropriate and gave feedback  Mood/Affect: appropriate and positive  Triggers (if applicable): n/a  Cognition: coherent/clear and goal directed  Progress: Moderate  Comments: Patients were provided with the DBT - Distress Tolerance Handout “Self-Soothing” with our five senses. We discussed ways to self-soothe in each of the five categories (Vision, Hearing, Smell, Taste, Touch). We also listened/watched a 10 minute guided mediation as a group and had discussion afterwards.    Pt was pleasant, cooperative, and engaged this morning. He was able to listen others and shared a variety of self-soothing techniques including time in nature (his \"safe place\") and a comfortable velvet blanket at home.     Plan: continue with services      "

## 2025-05-04 NOTE — NURSING NOTE
"Pt out in group area social with peers, talking and playing chess. A&Ox4 with VSS. Calm and cooperative while maintaining good eye contact during nursing assessment. Pt denied anxiety, depression, SI/HI, VAT hallucinations and physical pain. Last BM 5/3. Pt stated he attended groups throughout the day. Coping skills are \"magical toolbox from AA\". Shift goal is to \"sleep through the night\". Patient stated strengths are \"spending time with my kids and being louder than life\". Pt out in the group room till 0000. In bed sleeping at 0030. Slept through till 0500. Sitting in lobby talking to peers.   "

## 2025-05-05 VITALS
OXYGEN SATURATION: 99 % | WEIGHT: 173.28 LBS | BODY MASS INDEX: 25.67 KG/M2 | SYSTOLIC BLOOD PRESSURE: 124 MMHG | TEMPERATURE: 97 F | HEART RATE: 76 BPM | DIASTOLIC BLOOD PRESSURE: 89 MMHG | RESPIRATION RATE: 16 BRPM | HEIGHT: 69 IN

## 2025-05-05 PROBLEM — F32.A DEPRESSION WITH SUICIDAL IDEATION: Status: RESOLVED | Noted: 2025-05-01 | Resolved: 2025-05-05

## 2025-05-05 PROBLEM — R45.851 DEPRESSION WITH SUICIDAL IDEATION: Status: RESOLVED | Noted: 2025-05-01 | Resolved: 2025-05-05

## 2025-05-05 PROBLEM — F41.9 ANXIETY: Status: RESOLVED | Noted: 2023-12-06 | Resolved: 2025-05-05

## 2025-05-05 PROBLEM — F19.91 HISTORY OF ILLICIT DRUG USE: Status: RESOLVED | Noted: 2025-05-02 | Resolved: 2025-05-05

## 2025-05-05 PROBLEM — Z87.898 HISTORY OF ALCOHOL USE DISORDER: Status: RESOLVED | Noted: 2025-05-02 | Resolved: 2025-05-05

## 2025-05-05 PROCEDURE — 2500000001 HC RX 250 WO HCPCS SELF ADMINISTERED DRUGS (ALT 637 FOR MEDICARE OP): Performed by: PSYCHIATRY & NEUROLOGY

## 2025-05-05 PROCEDURE — 2500000001 HC RX 250 WO HCPCS SELF ADMINISTERED DRUGS (ALT 637 FOR MEDICARE OP): Performed by: NURSE PRACTITIONER

## 2025-05-05 PROCEDURE — 97530 THERAPEUTIC ACTIVITIES: CPT | Mod: GO,CO

## 2025-05-05 PROCEDURE — 97150 GROUP THERAPEUTIC PROCEDURES: CPT | Mod: GO,CO

## 2025-05-05 PROCEDURE — 2500000002 HC RX 250 W HCPCS SELF ADMINISTERED DRUGS (ALT 637 FOR MEDICARE OP, ALT 636 FOR OP/ED): Performed by: PSYCHIATRY & NEUROLOGY

## 2025-05-05 PROCEDURE — 99239 HOSP IP/OBS DSCHRG MGMT >30: CPT | Performed by: PSYCHIATRY & NEUROLOGY

## 2025-05-05 RX ORDER — MICONAZOLE NITRATE 2 %
2 CREAM (GRAM) TOPICAL EVERY 2 HOUR PRN
Qty: 100 EACH | Refills: 0 | Status: SHIPPED | OUTPATIENT
Start: 2025-05-05

## 2025-05-05 RX ORDER — VENLAFAXINE HYDROCHLORIDE 75 MG/1
75 CAPSULE, EXTENDED RELEASE ORAL DAILY
Qty: 30 CAPSULE | Refills: 1 | Status: SHIPPED | OUTPATIENT
Start: 2025-05-06 | End: 2025-07-05

## 2025-05-05 RX ADMIN — LISINOPRIL 10 MG: 5 TABLET ORAL at 08:14

## 2025-05-05 RX ADMIN — VENLAFAXINE HYDROCHLORIDE 75 MG: 75 CAPSULE, EXTENDED RELEASE ORAL at 08:14

## 2025-05-05 RX ADMIN — AMLODIPINE BESYLATE 5 MG: 5 TABLET ORAL at 08:14

## 2025-05-05 ASSESSMENT — COLUMBIA-SUICIDE SEVERITY RATING SCALE - C-SSRS
2. HAVE YOU ACTUALLY HAD ANY THOUGHTS OF KILLING YOURSELF?: NO
6. HAVE YOU EVER DONE ANYTHING, STARTED TO DO ANYTHING, OR PREPARED TO DO ANYTHING TO END YOUR LIFE?: NO
1. IN THE PAST MONTH, HAVE YOU WISHED YOU WERE DEAD OR WISHED YOU COULD GO TO SLEEP AND NOT WAKE UP?: NO

## 2025-05-05 ASSESSMENT — PAIN SCALES - GENERAL: PAINLEVEL_OUTOF10: 0 - NO PAIN

## 2025-05-05 ASSESSMENT — PAIN - FUNCTIONAL ASSESSMENT: PAIN_FUNCTIONAL_ASSESSMENT: 0-10
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
## 2025-05-05 NOTE — DISCHARGE INSTR - APPOINTMENTS
Tobacco Cessation Follow Up Appointment:  At  [facility],  Date: 05/12/2025 at 11:00 AM.  This appointment will be conducted over the phone; Call [phone], to schedule or verify your appointment.      Outpatient Mental Health Follow Up Appointments:     Individual Therapy with Melchor Shirley, In Person, Dual Diagnosis (Mental Health & Addiction)  On  Friday, May 9th, 2025 at 11:00 AM.  And  2.   Psychiatry with ANA Ferrari, In Person, Dual Diagnosis (Mental Health & Addiction)  On  Friday, May 16th, 2025 at 2:00 PM.    Both above appointments at:  [facility],  [place] [facility],  [address]  [place], [place]    P. [phone];  F. [phone];    You will receive intake paperwork in your email, so please complete before the above appointments.

## 2025-05-05 NOTE — CARE PLAN
"The patient's goals for the shift include \"sleep more than 4 hrs\"    The clinical goals for the shift include maintain safety    Over the shift, the patient did make progress toward the following goals.   Problem: Altered Thought Processes as Evidenced by  Goal: STG - Desires improvement in ability to think and concentrate  Outcome: Progressing  Goal: STG - Participates in Occupational Therapy and other cognitive assessments  Outcome: Progressing     Problem: Potential for Harm to Self or Others  Goal: Participates in unit activities  Outcome: Progressing  Goal: Patient/Family participate in treatment and discharge plans  Outcome: Progressing  Goal: Identifies deescalation techniques  Outcome: Progressing  Goal: Understands least restrictive measures  Outcome: Progressing  Goal: Identifies stressors that lead to harmful behaviors  Outcome: Progressing  Goal: Notifies staff when experiencing harmful thoughts toward self/others  Outcome: Progressing  Goal: Denies harm toward self or others  Outcome: Progressing  Goal: Free from restraint events  Outcome: Progressing     Problem: Ineffective Coping  Goal: Identifies ineffective coping skills  Outcome: Progressing  Goal: Identifies healthy coping skills  Outcome: Progressing  Goal: Demonstrates healthy coping skills  Outcome: Progressing  Goal: Participates in unit activities  Outcome: Progressing  Goal: Patient/Family participate in treatment and discharge plans  Outcome: Progressing  Goal: Patient/Family verbalizes awareness of resources  Outcome: Progressing  Goal: Understands least restrictive measures  Outcome: Progressing  Goal: Free from restraint events  Outcome: Progressing     Problem: Alteration in Sleep  Goal: STG - Reports nightly sleep, duration, and quality  Outcome: Progressing  Goal: STG - Identifies sleep hygiene aids  Outcome: Progressing  Goal: STG - Informs staff if unable to sleep  Outcome: Progressing  Goal: STG - Attends breathing and relaxation " group  Outcome: Progressing

## 2025-05-05 NOTE — CARE PLAN
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

## 2025-05-05 NOTE — GROUP NOTE
Group Topic: Community   Group Date: 5/5/2025  Start Time: 0730  End Time: 0815  Facilitators: BOSSMAN Mantilla   Department: [facility] REHAB THERAPY VIRTUAL    Number of Participants: 9   Group Focus: check in and community group  Treatment Modality: Recreation Therapy, [employer] Volunteer  Interventions utilized were: JAM[employer] Information and Discussion, exploration, patient education, story telling, and support  Purpose: Community Reintegration, Support, coping skills, insight or knowledge, and self-care    Name: Roman Ellis YOB: 1975   MR: [id number]      Facilitator: Recreational Therapist  Level of Participation: active  Quality of Participation: cooperative, engaged, and initiates communication  Interactions with others: appropriate  Mood/Affect: appropriate  Triggers (if applicable): N/A  Cognition: capable  Progress: Moderate  Comments: This session involved a volunteer from [employer] ([employer] [place] [employer]) providing community based resources for participants to utilize post discharge. The group provided some education on [facility] services in addition to being a support group for those to share their experiences dealing with mental health, being hospitalized, and any additional feedback.      Mr. Ellis attended the entire session. He shared information about his personal journey and utilizing community support systems. Patient was receptive and eager to learn more about the [facility] offered services. Patient was provided additional resources after group concluded.     Plan: continue with services

## 2025-05-05 NOTE — GROUP NOTE
"Group Topic: Music Therapy   Group Date: 5/5/2025  Start Time: 0930  End Time: 1030  Facilitators: Yee Harris   Department: [facility] EXPRESSIVE THER VIRTUAL    Number of Participants: 10   Group Focus: communication/socialization, expressive outlet, music therapy, and opportunity for choice/control  Treatment Modality: Music Therapy  Interventions Utilized were: active music engagement, empathic listening/validating emotions, and sharing/discussion    Participants were presented with a \"song menu\" and given opportunities to choose preferred songs. Participants invited to sing along and discuss lyrics afterwards.  Name: Roman Ellis YOB: 1975   MR: [id number]      Level of Participation: active  Quality of Participation: appropriate/pleasant, attention seeking, and engaged  Interactions with others: monopolizing and supportive  Mood/Affect: brightens with interaction and positive  Cognition, Pre Treatment: attentive  Cognition, Post Treatment: coherent/clear  Progress: Moderate  Plan: continue with services    Pt was an enthusiastic participant throughout. He chose songs, discussed music/musicians with peers and MT, shared about personal impact of music and lyrics, and sang along consistently throughout the group. Pt needed occasional reminders to redirect attention back to the music and to \"turn the volume down,\" but was pleasant and agreeable.   "

## 2025-05-05 NOTE — NURSING NOTE
Interdisciplinary team met with the patient and the patient met criteria for discharge. The patient was given and reviewed signs and symptoms sheet regarding when to seek treatment. The crisis hotline was given and reviewed. The patient was provided information on all medications that he was prescribed upon discharge. The patient was minimally receptive to education. Discussed the importance of compliance with medications and follow up care to promote wellness, prevent exacerbation of illness/symptoms and to prevent readmission. The patient verbalized understanding of all discharge instructions and was able to repeat back how he is supposed to take his medications, purpose of them and when his follow up appointment is. The patient denies suicidal/homicidal ideation or safety concerns at the time of this assessment. The patient will be discharged home today. The patient was receptive to a follow up call at discharge.

## 2025-05-05 NOTE — CARE PLAN
"The patient's goals for the shift include \"go home with my wife today\"    The clinical goals for the shift include maintain safety    Over the shift, the patient did make progress toward the following goals. Barriers to progression include acuteness of illness. Recommendations to address these barriers include educate patient and maintain Q 15 minute rounds for patient safety.      Problem: Sensory Perceptual Alteration as Evidenced by  Goal: Participates in unit activities  Outcome: Progressing     Problem: Potential for Harm to Self or Others  Goal: Identifies deescalation techniques  Outcome: Progressing     Problem: Ineffective Coping  Goal: Identifies healthy coping skills  Outcome: Progressing     Problem: Ineffective Coping  Goal: Demonstrates healthy coping skills  Outcome: Progressing     Problem: Anxiety  Goal: Attempts to manage anxiety with help  Outcome: Progressing     Problem: Anxiety  Goal: Implements measures to reduce anxiety  Outcome: Progressing     Problem: Anxiety  Goal: Free from restraint events  Outcome: Progressing       "

## 2025-05-05 NOTE — NURSING NOTE
"Pt out in the group room. Social with peers and staff. A&Ox4 with VSS. Calm and cooperative, maintaining good eye contact during nursing assessment. Denied anxiety, depression, SI/HI, VAT hallucinations and physical pain. Last BM 5/4. Medication compliant. Coping skill is \"meditation\". Shift goal \"sleep more than 4 hours\". Patient stated strengths are \"gianna and compassion\". Family meeting planned for 1pm today with spouse. Pt in bed at 2130. Slept through the night with no issues.   "

## 2025-05-08 ENCOUNTER — PATIENT OUTREACH (OUTPATIENT)
Dept: CARE COORDINATION | Facility: CLINIC | Age: 50
End: 2025-05-08
Payer: COMMERCIAL

## 2025-05-08 NOTE — PROGRESS NOTES
Pt. Identified for post discharge services. Initial outreach call to introduce self, available services, and assess needs.  Unable to LVM due to number having vm full.  Will re-attempt outreach.   SHORTY Whaley  [facility]    Contact: [phone]

## 2025-05-08 NOTE — SIGNIFICANT EVENT
Follow Up Phone Call    Outgoing phone call    Spoke to: Rmoan Ellis Relationship:self   Phone number: [phone]      Outcome: No answer/busy signal   No chief complaint on file.         Diagnosis:Not applicable    Unable to contact patient. Voice mailbox full.

## 2025-05-15 ENCOUNTER — HOSPITAL ENCOUNTER (INPATIENT)
Facility: HOSPITAL | Age: 50
LOS: 1 days | Discharge: NURSING, REHAB OR LONG-TERM CARE | End: 2025-05-17
Attending: STUDENT IN AN ORGANIZED HEALTH CARE EDUCATION/TRAINING PROGRAM | Admitting: INTERNAL MEDICINE
Payer: COMMERCIAL

## 2025-05-15 DIAGNOSIS — F10.10 ALCOHOL ABUSE: Primary | ICD-10-CM

## 2025-05-15 DIAGNOSIS — F17.200 TOBACCO USE DISORDER: ICD-10-CM

## 2025-05-15 LAB
AMPHETAMINES UR QL SCN: ABNORMAL
BARBITURATES UR QL SCN: ABNORMAL
BENZODIAZ UR QL SCN: ABNORMAL
BZE UR QL SCN: ABNORMAL
CANNABINOIDS UR QL SCN: ABNORMAL
ETHANOL SERPL-MCNC: 193 MG/DL
FENTANYL+NORFENTANYL UR QL SCN: ABNORMAL
METHADONE UR QL SCN: ABNORMAL
OPIATES UR QL SCN: ABNORMAL
OXYCODONE+OXYMORPHONE UR QL SCN: ABNORMAL
PCP UR QL SCN: ABNORMAL

## 2025-05-15 PROCEDURE — 36415 COLL VENOUS BLD VENIPUNCTURE: CPT | Performed by: STUDENT IN AN ORGANIZED HEALTH CARE EDUCATION/TRAINING PROGRAM

## 2025-05-15 PROCEDURE — 80349 CANNABINOIDS NATURAL: CPT | Performed by: STUDENT IN AN ORGANIZED HEALTH CARE EDUCATION/TRAINING PROGRAM

## 2025-05-15 PROCEDURE — 99285 EMERGENCY DEPT VISIT HI MDM: CPT | Performed by: STUDENT IN AN ORGANIZED HEALTH CARE EDUCATION/TRAINING PROGRAM

## 2025-05-15 PROCEDURE — 82077 ASSAY SPEC XCP UR&BREATH IA: CPT | Performed by: STUDENT IN AN ORGANIZED HEALTH CARE EDUCATION/TRAINING PROGRAM

## 2025-05-15 PROCEDURE — 2500000001 HC RX 250 WO HCPCS SELF ADMINISTERED DRUGS (ALT 637 FOR MEDICARE OP): Performed by: STUDENT IN AN ORGANIZED HEALTH CARE EDUCATION/TRAINING PROGRAM

## 2025-05-15 PROCEDURE — 80307 DRUG TEST PRSMV CHEM ANLYZR: CPT | Performed by: STUDENT IN AN ORGANIZED HEALTH CARE EDUCATION/TRAINING PROGRAM

## 2025-05-15 RX ORDER — MULTIVIT-MIN/IRON FUM/FOLIC AC 7.5 MG-4
1 TABLET ORAL DAILY
Status: DISCONTINUED | OUTPATIENT
Start: 2025-05-15 | End: 2025-05-17 | Stop reason: HOSPADM

## 2025-05-15 RX ORDER — LANOLIN ALCOHOL/MO/W.PET/CERES
100 CREAM (GRAM) TOPICAL DAILY
Status: DISCONTINUED | OUTPATIENT
Start: 2025-05-15 | End: 2025-05-17 | Stop reason: HOSPADM

## 2025-05-15 RX ORDER — LORAZEPAM 2 MG/ML
0.5 INJECTION INTRAMUSCULAR EVERY 2 HOUR PRN
Status: DISCONTINUED | OUTPATIENT
Start: 2025-05-15 | End: 2025-05-17 | Stop reason: HOSPADM

## 2025-05-15 RX ORDER — LORAZEPAM 2 MG/ML
1 INJECTION INTRAMUSCULAR EVERY 2 HOUR PRN
Status: DISCONTINUED | OUTPATIENT
Start: 2025-05-15 | End: 2025-05-17 | Stop reason: HOSPADM

## 2025-05-15 RX ORDER — LORAZEPAM 2 MG/ML
2 INJECTION INTRAMUSCULAR EVERY 2 HOUR PRN
Status: DISCONTINUED | OUTPATIENT
Start: 2025-05-15 | End: 2025-05-17 | Stop reason: HOSPADM

## 2025-05-15 RX ORDER — FOLIC ACID 1 MG/1
1 TABLET ORAL DAILY
Status: DISCONTINUED | OUTPATIENT
Start: 2025-05-15 | End: 2025-05-17 | Stop reason: HOSPADM

## 2025-05-15 RX ADMIN — Medication 1 TABLET: at 23:03

## 2025-05-15 RX ADMIN — FOLIC ACID 1 MG: 1 TABLET ORAL at 23:03

## 2025-05-15 RX ADMIN — THIAMINE HCL TAB 100 MG 100 MG: 100 TAB at 23:03

## 2025-05-15 SDOH — HEALTH STABILITY: MENTAL HEALTH: NEEDS EXPRESSED: DENIES

## 2025-05-15 SDOH — HEALTH STABILITY: MENTAL HEALTH: BEHAVIORAL HEALTH(WDL): EXCEPTIONS TO WDL

## 2025-05-15 SDOH — SOCIAL STABILITY: SOCIAL INSECURITY: FAMILY BEHAVIORS: COOPERATIVE;CALM

## 2025-05-15 SDOH — HEALTH STABILITY: MENTAL HEALTH: BEHAVIORS/MOOD: AGITATED;APPEARS INTOXICATED;COOPERATIVE

## 2025-05-15 ASSESSMENT — LIFESTYLE VARIABLES
TOTAL SCORE: 0
PAROXYSMAL SWEATS: NO SWEAT VISIBLE
AUDITORY DISTURBANCES: NOT PRESENT
HAVE PEOPLE ANNOYED YOU BY CRITICIZING YOUR DRINKING: NO
VISUAL DISTURBANCES: NOT PRESENT
EVER HAD A DRINK FIRST THING IN THE MORNING TO STEADY YOUR NERVES TO GET RID OF A HANGOVER: NO
PULSE: 98
TREMOR: NO TREMOR
EVER FELT BAD OR GUILTY ABOUT YOUR DRINKING: NO
BLOOD PRESSURE: 132/86
AUDITORY DISTURBANCES: NOT PRESENT
AGITATION: NORMAL ACTIVITY
TREMOR: NO TREMOR
ANXIETY: NO ANXIETY, AT EASE
HAVE YOU EVER FELT YOU SHOULD CUT DOWN ON YOUR DRINKING: NO
AGITATION: NORMAL ACTIVITY
ANXIETY: NO ANXIETY, AT EASE
HEADACHE, FULLNESS IN HEAD: NOT PRESENT
HEADACHE, FULLNESS IN HEAD: NOT PRESENT
BLOOD PRESSURE: 140/80
TOTAL SCORE: 0
ORIENTATION AND CLOUDING OF SENSORIUM: ORIENTED AND CAN DO SERIAL ADDITIONS
VISUAL DISTURBANCES: NOT PRESENT
PULSE: 95
TOTAL SCORE: 0
PAROXYSMAL SWEATS: NO SWEAT VISIBLE
NAUSEA AND VOMITING: NO NAUSEA AND NO VOMITING
ORIENTATION AND CLOUDING OF SENSORIUM: ORIENTED AND CAN DO SERIAL ADDITIONS
NAUSEA AND VOMITING: NO NAUSEA AND NO VOMITING

## 2025-05-15 ASSESSMENT — ABNORMAL INVOLUNTARY MOVEMENT SCALE (AIMS)
UPPER_BODY_EXTREMITIES: NONE, NORMAL
LOWER_BODY_EXTREMITIES: NONE, NORMAL
NECK_SHOULDER_HIPS: NONE, NORMAL
TONGUE: NONE, NORMAL
LIPS_PARIETAL: NONE, NORMAL
PATIENT_WEARS_DENTURES: NO
CURRENT_PROBLEMS_TEETH_DENTURES: NO
JAW: NONE, NORMAL
AIMS_PATIENT_INCAPACITATION: NONE, NORMAL
FACIAL_EXPRESSION_MUSCLES: NONE, NORMAL

## 2025-05-15 ASSESSMENT — PAIN - FUNCTIONAL ASSESSMENT: PAIN_FUNCTIONAL_ASSESSMENT: 0-10

## 2025-05-15 ASSESSMENT — PAIN SCALES - GENERAL: PAINLEVEL_OUTOF10: 0 - NO PAIN

## 2025-05-16 ENCOUNTER — APPOINTMENT (OUTPATIENT)
Dept: CARDIOLOGY | Facility: HOSPITAL | Age: 50
End: 2025-05-16
Payer: COMMERCIAL

## 2025-05-16 PROBLEM — F10.10 ALCOHOL ABUSE: Status: ACTIVE | Noted: 2025-05-16

## 2025-05-16 LAB
ALBUMIN SERPL BCP-MCNC: 4.2 G/DL (ref 3.4–5)
ALBUMIN SERPL BCP-MCNC: 4.6 G/DL (ref 3.4–5)
ALP SERPL-CCNC: 39 U/L (ref 33–120)
ALP SERPL-CCNC: 43 U/L (ref 33–120)
ALT SERPL W P-5'-P-CCNC: 35 U/L (ref 10–52)
ALT SERPL W P-5'-P-CCNC: 42 U/L (ref 10–52)
ANION GAP SERPL CALC-SCNC: 12 MMOL/L (ref 10–20)
ANION GAP SERPL CALC-SCNC: 17 MMOL/L (ref 10–20)
AST SERPL W P-5'-P-CCNC: 33 U/L (ref 9–39)
AST SERPL W P-5'-P-CCNC: 41 U/L (ref 9–39)
ATRIAL RATE: 84 BPM
BILIRUB SERPL-MCNC: 0.4 MG/DL (ref 0–1.2)
BILIRUB SERPL-MCNC: 0.5 MG/DL (ref 0–1.2)
BUN SERPL-MCNC: 10 MG/DL (ref 6–23)
BUN SERPL-MCNC: 9 MG/DL (ref 6–23)
CALCIUM SERPL-MCNC: 8.9 MG/DL (ref 8.6–10.3)
CALCIUM SERPL-MCNC: 8.9 MG/DL (ref 8.6–10.3)
CHLORIDE SERPL-SCNC: 102 MMOL/L (ref 98–107)
CHLORIDE SERPL-SCNC: 103 MMOL/L (ref 98–107)
CO2 SERPL-SCNC: 24 MMOL/L (ref 21–32)
CO2 SERPL-SCNC: 29 MMOL/L (ref 21–32)
CREAT SERPL-MCNC: 0.86 MG/DL (ref 0.5–1.3)
CREAT SERPL-MCNC: 0.9 MG/DL (ref 0.5–1.3)
EGFRCR SERPLBLD CKD-EPI 2021: >90 ML/MIN/1.73M*2
EGFRCR SERPLBLD CKD-EPI 2021: >90 ML/MIN/1.73M*2
ERYTHROCYTE [DISTWIDTH] IN BLOOD BY AUTOMATED COUNT: 13.6 % (ref 11.5–14.5)
ERYTHROCYTE [DISTWIDTH] IN BLOOD BY AUTOMATED COUNT: 13.7 % (ref 11.5–14.5)
GLUCOSE SERPL-MCNC: 102 MG/DL (ref 74–99)
GLUCOSE SERPL-MCNC: 117 MG/DL (ref 74–99)
HCT VFR BLD AUTO: 44 % (ref 41–52)
HCT VFR BLD AUTO: 44.5 % (ref 41–52)
HGB BLD-MCNC: 15.4 G/DL (ref 13.5–17.5)
HGB BLD-MCNC: 15.5 G/DL (ref 13.5–17.5)
INR PPP: 1 (ref 0.9–1.1)
MAGNESIUM SERPL-MCNC: 2.35 MG/DL (ref 1.6–2.4)
MCH RBC QN AUTO: 30.4 PG (ref 26–34)
MCH RBC QN AUTO: 30.9 PG (ref 26–34)
MCHC RBC AUTO-ENTMCNC: 34.6 G/DL (ref 32–36)
MCHC RBC AUTO-ENTMCNC: 35.2 G/DL (ref 32–36)
MCV RBC AUTO: 88 FL (ref 80–100)
MCV RBC AUTO: 88 FL (ref 80–100)
NRBC BLD-RTO: 0 /100 WBCS (ref 0–0)
NRBC BLD-RTO: 0 /100 WBCS (ref 0–0)
P AXIS: 19 DEGREES
P OFFSET: 188 MS
P ONSET: 131 MS
PLATELET # BLD AUTO: 301 X10*3/UL (ref 150–450)
PLATELET # BLD AUTO: 343 X10*3/UL (ref 150–450)
POTASSIUM SERPL-SCNC: 4.1 MMOL/L (ref 3.5–5.3)
POTASSIUM SERPL-SCNC: 4.2 MMOL/L (ref 3.5–5.3)
PR INTERVAL: 168 MS
PROT SERPL-MCNC: 6.5 G/DL (ref 6.4–8.2)
PROT SERPL-MCNC: 7.2 G/DL (ref 6.4–8.2)
PROTHROMBIN TIME: 11.2 SECONDS (ref 9.8–12.4)
Q ONSET: 215 MS
QRS COUNT: 14 BEATS
QRS DURATION: 82 MS
QT INTERVAL: 380 MS
QTC CALCULATION(BAZETT): 449 MS
QTC FREDERICIA: 424 MS
R AXIS: 83 DEGREES
RBC # BLD AUTO: 5.01 X10*6/UL (ref 4.5–5.9)
RBC # BLD AUTO: 5.07 X10*6/UL (ref 4.5–5.9)
SODIUM SERPL-SCNC: 139 MMOL/L (ref 136–145)
SODIUM SERPL-SCNC: 140 MMOL/L (ref 136–145)
T AXIS: 58 DEGREES
T OFFSET: 405 MS
VENTRICULAR RATE: 84 BPM
WBC # BLD AUTO: 10.9 X10*3/UL (ref 4.4–11.3)
WBC # BLD AUTO: 8.4 X10*3/UL (ref 4.4–11.3)

## 2025-05-16 PROCEDURE — 36415 COLL VENOUS BLD VENIPUNCTURE: CPT

## 2025-05-16 PROCEDURE — 2500000004 HC RX 250 GENERAL PHARMACY W/ HCPCS (ALT 636 FOR OP/ED): Mod: JZ

## 2025-05-16 PROCEDURE — 85610 PROTHROMBIN TIME: CPT | Performed by: STUDENT IN AN ORGANIZED HEALTH CARE EDUCATION/TRAINING PROGRAM

## 2025-05-16 PROCEDURE — 99223 1ST HOSP IP/OBS HIGH 75: CPT

## 2025-05-16 PROCEDURE — 83735 ASSAY OF MAGNESIUM: CPT | Performed by: STUDENT IN AN ORGANIZED HEALTH CARE EDUCATION/TRAINING PROGRAM

## 2025-05-16 PROCEDURE — 80053 COMPREHEN METABOLIC PANEL: CPT | Performed by: STUDENT IN AN ORGANIZED HEALTH CARE EDUCATION/TRAINING PROGRAM

## 2025-05-16 PROCEDURE — 80053 COMPREHEN METABOLIC PANEL: CPT

## 2025-05-16 PROCEDURE — 2500000002 HC RX 250 W HCPCS SELF ADMINISTERED DRUGS (ALT 637 FOR MEDICARE OP, ALT 636 FOR OP/ED)

## 2025-05-16 PROCEDURE — 99255 IP/OBS CONSLTJ NEW/EST HI 80: CPT | Performed by: NURSE PRACTITIONER

## 2025-05-16 PROCEDURE — 1200000002 HC GENERAL ROOM WITH TELEMETRY DAILY

## 2025-05-16 PROCEDURE — 85027 COMPLETE CBC AUTOMATED: CPT | Performed by: STUDENT IN AN ORGANIZED HEALTH CARE EDUCATION/TRAINING PROGRAM

## 2025-05-16 PROCEDURE — 93005 ELECTROCARDIOGRAM TRACING: CPT

## 2025-05-16 PROCEDURE — 85027 COMPLETE CBC AUTOMATED: CPT

## 2025-05-16 PROCEDURE — 36415 COLL VENOUS BLD VENIPUNCTURE: CPT | Performed by: STUDENT IN AN ORGANIZED HEALTH CARE EDUCATION/TRAINING PROGRAM

## 2025-05-16 PROCEDURE — 2500000001 HC RX 250 WO HCPCS SELF ADMINISTERED DRUGS (ALT 637 FOR MEDICARE OP)

## 2025-05-16 RX ORDER — ENOXAPARIN SODIUM 100 MG/ML
40 INJECTION SUBCUTANEOUS EVERY 24 HOURS
Status: DISCONTINUED | OUTPATIENT
Start: 2025-05-16 | End: 2025-05-17 | Stop reason: HOSPADM

## 2025-05-16 RX ORDER — LISINOPRIL 10 MG/1
10 TABLET ORAL DAILY
Status: DISCONTINUED | OUTPATIENT
Start: 2025-05-16 | End: 2025-05-17 | Stop reason: HOSPADM

## 2025-05-16 RX ORDER — AMLODIPINE BESYLATE 10 MG/1
10 TABLET ORAL DAILY
Status: DISCONTINUED | OUTPATIENT
Start: 2025-05-16 | End: 2025-05-17 | Stop reason: HOSPADM

## 2025-05-16 RX ORDER — POLYETHYLENE GLYCOL 3350 17 G/17G
17 POWDER, FOR SOLUTION ORAL DAILY PRN
Status: DISCONTINUED | OUTPATIENT
Start: 2025-05-16 | End: 2025-05-17 | Stop reason: HOSPADM

## 2025-05-16 RX ORDER — VENLAFAXINE HYDROCHLORIDE 75 MG/1
75 CAPSULE, EXTENDED RELEASE ORAL DAILY
Status: DISCONTINUED | OUTPATIENT
Start: 2025-05-16 | End: 2025-05-17 | Stop reason: HOSPADM

## 2025-05-16 RX ORDER — IBUPROFEN 200 MG
1 TABLET ORAL DAILY
Status: DISCONTINUED | OUTPATIENT
Start: 2025-05-16 | End: 2025-05-17 | Stop reason: HOSPADM

## 2025-05-16 RX ADMIN — ENOXAPARIN SODIUM 40 MG: 40 INJECTION SUBCUTANEOUS at 08:35

## 2025-05-16 RX ADMIN — AMLODIPINE BESYLATE 10 MG: 10 TABLET ORAL at 08:36

## 2025-05-16 RX ADMIN — LISINOPRIL 10 MG: 10 TABLET ORAL at 08:36

## 2025-05-16 RX ADMIN — VENLAFAXINE HYDROCHLORIDE 75 MG: 75 CAPSULE, EXTENDED RELEASE ORAL at 08:34

## 2025-05-16 SDOH — HEALTH STABILITY: MENTAL HEALTH: NEEDS EXPRESSED: DENIES

## 2025-05-16 SDOH — HEALTH STABILITY: MENTAL HEALTH: BEHAVIORAL HEALTH(WDL): EXCEPTIONS TO WDL

## 2025-05-16 SDOH — SOCIAL STABILITY: SOCIAL INSECURITY: WITHIN THE LAST YEAR, HAVE YOU BEEN HUMILIATED OR EMOTIONALLY ABUSED IN OTHER WAYS BY YOUR PARTNER OR EX-PARTNER?: NO

## 2025-05-16 SDOH — ECONOMIC STABILITY: FOOD INSECURITY: WITHIN THE PAST 12 MONTHS, THE FOOD YOU BOUGHT JUST DIDN'T LAST AND YOU DIDN'T HAVE MONEY TO GET MORE.: NEVER TRUE

## 2025-05-16 SDOH — ECONOMIC STABILITY: FOOD INSECURITY: HOW HARD IS IT FOR YOU TO PAY FOR THE VERY BASICS LIKE FOOD, HOUSING, MEDICAL CARE, AND HEATING?: NOT HARD AT ALL

## 2025-05-16 SDOH — ECONOMIC STABILITY: HOUSING INSECURITY: IN THE PAST 12 MONTHS, HOW MANY TIMES HAVE YOU MOVED WHERE YOU WERE LIVING?: 0

## 2025-05-16 SDOH — ECONOMIC STABILITY: INCOME INSECURITY: IN THE PAST 12 MONTHS HAS THE ELECTRIC, GAS, OIL, OR WATER COMPANY THREATENED TO SHUT OFF SERVICES IN YOUR HOME?: NO

## 2025-05-16 SDOH — HEALTH STABILITY: MENTAL HEALTH: PREVIOUS MENTAL HEALTH TREATMENT: INPATIENT TREATMENT

## 2025-05-16 SDOH — SOCIAL STABILITY: SOCIAL INSECURITY: DO YOU FEEL ANYONE HAS EXPLOITED OR TAKEN ADVANTAGE OF YOU FINANCIALLY OR OF YOUR PERSONAL PROPERTY?: NO

## 2025-05-16 SDOH — SOCIAL STABILITY: SOCIAL INSECURITY: WITHIN THE LAST YEAR, HAVE YOU BEEN AFRAID OF YOUR PARTNER OR EX-PARTNER?: NO

## 2025-05-16 SDOH — ECONOMIC STABILITY: HOUSING INSECURITY: IN THE LAST 12 MONTHS, WAS THERE A TIME WHEN YOU WERE NOT ABLE TO PAY THE MORTGAGE OR RENT ON TIME?: NO

## 2025-05-16 SDOH — SOCIAL STABILITY: SOCIAL INSECURITY: HAVE YOU HAD ANY THOUGHTS OF HARMING ANYONE ELSE?: NO

## 2025-05-16 SDOH — SOCIAL STABILITY: SOCIAL INSECURITY: HAVE YOU HAD THOUGHTS OF HARMING ANYONE ELSE?: NO

## 2025-05-16 SDOH — ECONOMIC STABILITY: FOOD INSECURITY: WITHIN THE PAST 12 MONTHS, YOU WORRIED THAT YOUR FOOD WOULD RUN OUT BEFORE YOU GOT THE MONEY TO BUY MORE.: NEVER TRUE

## 2025-05-16 SDOH — SOCIAL STABILITY: SOCIAL INSECURITY: ARE YOU OR HAVE YOU BEEN THREATENED OR ABUSED PHYSICALLY, EMOTIONALLY, OR SEXUALLY BY ANYONE?: NO

## 2025-05-16 SDOH — HEALTH STABILITY: MENTAL HEALTH: BEHAVIOR: ORIENTED

## 2025-05-16 SDOH — ECONOMIC STABILITY: HOUSING INSECURITY: AT ANY TIME IN THE PAST 12 MONTHS, WERE YOU HOMELESS OR LIVING IN A SHELTER (INCLUDING NOW)?: NO

## 2025-05-16 SDOH — SOCIAL STABILITY: SOCIAL INSECURITY: FAMILY BEHAVIORS: COOPERATIVE;CALM

## 2025-05-16 SDOH — HEALTH STABILITY: MENTAL HEALTH: BEHAVIORS/MOOD: SLEEPING

## 2025-05-16 SDOH — SOCIAL STABILITY: SOCIAL INSECURITY: DOES ANYONE TRY TO KEEP YOU FROM HAVING/CONTACTING OTHER FRIENDS OR DOING THINGS OUTSIDE YOUR HOME?: NO

## 2025-05-16 SDOH — SOCIAL STABILITY: SOCIAL INSECURITY: DO YOU FEEL UNSAFE GOING BACK TO THE PLACE WHERE YOU ARE LIVING?: NO

## 2025-05-16 SDOH — SOCIAL STABILITY: SOCIAL INSECURITY: ABUSE: ADULT

## 2025-05-16 SDOH — HEALTH STABILITY: MENTAL HEALTH: REPORTED TYPE OF SUBSTANCE: ALCOHOL;STREET DRUG

## 2025-05-16 SDOH — ECONOMIC STABILITY: TRANSPORTATION INSECURITY: IN THE PAST 12 MONTHS, HAS LACK OF TRANSPORTATION KEPT YOU FROM MEDICAL APPOINTMENTS OR FROM GETTING MEDICATIONS?: NO

## 2025-05-16 SDOH — HEALTH STABILITY: MENTAL HEALTH
SUBSTANCE USE: PT ADMITTED FOR DETOX; REQUESTING TRANSFER TO THE VA WHICH IS IN PROCESS VIA THE TRANSFER CENTER.  SW UNABLE TO SEE PT DUE TO SITTER AND BEING SOMNOLENT.  SW WILL FOLLOW.

## 2025-05-16 SDOH — SOCIAL STABILITY: SOCIAL INSECURITY: WERE YOU ABLE TO COMPLETE ALL THE BEHAVIORAL HEALTH SCREENINGS?: YES

## 2025-05-16 SDOH — SOCIAL STABILITY: SOCIAL INSECURITY: ARE THERE ANY APPARENT SIGNS OF INJURIES/BEHAVIORS THAT COULD BE RELATED TO ABUSE/NEGLECT?: NO

## 2025-05-16 SDOH — SOCIAL STABILITY: SOCIAL INSECURITY: HAS ANYONE EVER THREATENED TO HURT YOUR FAMILY OR YOUR PETS?: NO

## 2025-05-16 ASSESSMENT — LIFESTYLE VARIABLES
PULSE: 98
VISUAL DISTURBANCES: NOT PRESENT
ANXIETY: NO ANXIETY, AT EASE
AUDIT-C TOTAL SCORE: 7
ANXIETY: NO ANXIETY, AT EASE
ANXIETY: NO ANXIETY, AT EASE
HOW MANY STANDARD DRINKS CONTAINING ALCOHOL DO YOU HAVE ON A TYPICAL DAY: 1 OR 2
NAUSEA AND VOMITING: NO NAUSEA AND NO VOMITING
NAUSEA AND VOMITING: NO NAUSEA AND NO VOMITING
TOTAL SCORE: 1
AUDITORY DISTURBANCES: NOT PRESENT
TREMOR: NO TREMOR
VISUAL DISTURBANCES: NOT PRESENT
HOW OFTEN DO YOU HAVE A DRINK CONTAINING ALCOHOL: 4 OR MORE TIMES A WEEK
NAUSEA AND VOMITING: NO NAUSEA AND NO VOMITING
NAUSEA AND VOMITING: NO NAUSEA AND NO VOMITING
AUDITORY DISTURBANCES: NOT PRESENT
TOTAL SCORE: 4
TOTAL SCORE: 2
ORIENTATION AND CLOUDING OF SENSORIUM: ORIENTED AND CAN DO SERIAL ADDITIONS
HEADACHE, FULLNESS IN HEAD: NOT PRESENT
PAROXYSMAL SWEATS: 2
VISUAL DISTURBANCES: NOT PRESENT
VISUAL DISTURBANCES: NOT PRESENT
ANXIETY: NO ANXIETY, AT EASE
TREMOR: NO TREMOR
ORIENTATION AND CLOUDING OF SENSORIUM: ORIENTED AND CAN DO SERIAL ADDITIONS
AGITATION: NORMAL ACTIVITY
ORIENTATION AND CLOUDING OF SENSORIUM: ORIENTED AND CAN DO SERIAL ADDITIONS
AUDITORY DISTURBANCES: NOT PRESENT
HEADACHE, FULLNESS IN HEAD: NOT PRESENT
LEVEL_ALCOHOL_USE: ADDICTION/DEPENDENCY
AGITATION: NORMAL ACTIVITY
ORIENTATION AND CLOUDING OF SENSORIUM: ORIENTED AND CAN DO SERIAL ADDITIONS
AGITATION: NORMAL ACTIVITY
ORIENTATION AND CLOUDING OF SENSORIUM: ORIENTED AND CAN DO SERIAL ADDITIONS
AUDITORY DISTURBANCES: NOT PRESENT
ANXIETY: NO ANXIETY, AT EASE
NAUSEA AND VOMITING: NO NAUSEA AND NO VOMITING
TOTAL SCORE: 0
AUDITORY DISTURBANCES: NOT PRESENT
VISUAL DISTURBANCES: NOT PRESENT
AUDITORY DISTURBANCES: NOT PRESENT
ORIENTATION AND CLOUDING OF SENSORIUM: ORIENTED AND CAN DO SERIAL ADDITIONS
VISUAL DISTURBANCES: NOT PRESENT
NAUSEA AND VOMITING: NO NAUSEA AND NO VOMITING
PAROXYSMAL SWEATS: NO SWEAT VISIBLE
HEADACHE, FULLNESS IN HEAD: NOT PRESENT
ORIENTATION AND CLOUDING OF SENSORIUM: ORIENTED AND CAN DO SERIAL ADDITIONS
ORIENTATION AND CLOUDING OF SENSORIUM: ORIENTED AND CAN DO SERIAL ADDITIONS
ANXIETY: NO ANXIETY, AT EASE
TREMOR: NO TREMOR
AGITATION: NORMAL ACTIVITY
TOTAL SCORE: 0
TREMOR: NO TREMOR
NAUSEA AND VOMITING: NO NAUSEA AND NO VOMITING
ANXIETY: NO ANXIETY, AT EASE
PAROXYSMAL SWEATS: BARELY PERCEPTIBLE SWEATING, PALMS MOIST
AGITATION: NORMAL ACTIVITY
HEADACHE, FULLNESS IN HEAD: NOT PRESENT
TREMOR: NO TREMOR
AGITATION: NORMAL ACTIVITY
TOTAL SCORE: 2
VISUAL DISTURBANCES: NOT PRESENT
NAUSEA AND VOMITING: NO NAUSEA AND NO VOMITING
HEADACHE, FULLNESS IN HEAD: NOT PRESENT
TREMOR: NO TREMOR
TOTAL SCORE: 0
VISUAL DISTURBANCES: NOT PRESENT
PAROXYSMAL SWEATS: NO SWEAT VISIBLE
VISUAL DISTURBANCES: NOT PRESENT
HEADACHE, FULLNESS IN HEAD: NOT PRESENT
PAROXYSMAL SWEATS: NO SWEAT VISIBLE
AUDITORY DISTURBANCES: NOT PRESENT
ORIENTATION AND CLOUDING OF SENSORIUM: ORIENTED AND CAN DO SERIAL ADDITIONS
ANXIETY: 2
TREMOR: NO TREMOR
VISUAL DISTURBANCES: NOT PRESENT
AUDIT-C TOTAL SCORE: 7
PAROXYSMAL SWEATS: BARELY PERCEPTIBLE SWEATING, PALMS MOIST
AUDITORY DISTURBANCES: NOT PRESENT
PAROXYSMAL SWEATS: NO SWEAT VISIBLE
TOTAL SCORE: 0
TREMOR: 2
HEADACHE, FULLNESS IN HEAD: NOT PRESENT
AUDITORY DISTURBANCES: NOT PRESENT
ORIENTATION AND CLOUDING OF SENSORIUM: ORIENTED AND CAN DO SERIAL ADDITIONS
ANXIETY: NO ANXIETY, AT EASE
HEADACHE, FULLNESS IN HEAD: NOT PRESENT
NAUSEA AND VOMITING: NO NAUSEA AND NO VOMITING
ORIENTATION AND CLOUDING OF SENSORIUM: ORIENTED AND CAN DO SERIAL ADDITIONS
TOTAL SCORE: 0
NAUSEA AND VOMITING: NO NAUSEA AND NO VOMITING
ANXIETY: NO ANXIETY, AT EASE
HEADACHE, FULLNESS IN HEAD: NOT PRESENT
TREMOR: NO TREMOR
PAROXYSMAL SWEATS: NO SWEAT VISIBLE
AGITATION: NORMAL ACTIVITY
PAROXYSMAL SWEATS: BARELY PERCEPTIBLE SWEATING, PALMS MOIST
TREMOR: NO TREMOR
SKIP TO QUESTIONS 9-10: 0
AGITATION: SOMEWHAT MORE THAN NORMAL ACTIVITY
ANXIETY: NO ANXIETY, AT EASE
TREMOR: NO TREMOR
AGITATION: SOMEWHAT MORE THAN NORMAL ACTIVITY
NAUSEA AND VOMITING: NO NAUSEA AND NO VOMITING
AGITATION: NORMAL ACTIVITY
HEADACHE, FULLNESS IN HEAD: NOT PRESENT
AGITATION: NORMAL ACTIVITY
PAROXYSMAL SWEATS: NO SWEAT VISIBLE
AUDITORY DISTURBANCES: NOT PRESENT
TOTAL SCORE: 0
PAROXYSMAL SWEATS: NO SWEAT VISIBLE
AUDITORY DISTURBANCES: NOT PRESENT
VISUAL DISTURBANCES: NOT PRESENT
HEADACHE, FULLNESS IN HEAD: NOT PRESENT
TOTAL SCORE: 2
HOW OFTEN DO YOU HAVE 6 OR MORE DRINKS ON ONE OCCASION: WEEKLY

## 2025-05-16 ASSESSMENT — ACTIVITIES OF DAILY LIVING (ADL)
WALKS IN HOME: INDEPENDENT
DRESSING YOURSELF: INDEPENDENT
TOILETING: INDEPENDENT
JUDGMENT_ADEQUATE_SAFELY_COMPLETE_DAILY_ACTIVITIES: YES
LACK_OF_TRANSPORTATION: NO
BATHING: INDEPENDENT
GROOMING: INDEPENDENT
LACK_OF_TRANSPORTATION: NO
BATHING: NO ASSISTANCE
ADEQUATE_TO_COMPLETE_ADL: YES
HEARING - LEFT EAR: FUNCTIONAL
PATIENT'S MEMORY ADEQUATE TO SAFELY COMPLETE DAILY ACTIVITIES?: YES
FEEDING YOURSELF: INDEPENDENT
HEARING - RIGHT EAR: FUNCTIONAL

## 2025-05-16 ASSESSMENT — PATIENT HEALTH QUESTIONNAIRE - PHQ9
2. FEELING DOWN, DEPRESSED OR HOPELESS: NOT AT ALL
SUM OF ALL RESPONSES TO PHQ9 QUESTIONS 1 & 2: 0
1. LITTLE INTEREST OR PLEASURE IN DOING THINGS: NOT AT ALL

## 2025-05-16 ASSESSMENT — COGNITIVE AND FUNCTIONAL STATUS - GENERAL
PATIENT BASELINE BEDBOUND: NO
DAILY ACTIVITIY SCORE: 24
MOBILITY SCORE: 24
DAILY ACTIVITIY SCORE: 24
MOBILITY SCORE: 24

## 2025-05-16 ASSESSMENT — ENCOUNTER SYMPTOMS
FATIGUE: 0
FEVER: 0
SHORTNESS OF BREATH: 0
ABDOMINAL PAIN: 0
DIFFICULTY URINATING: 0

## 2025-05-16 ASSESSMENT — PAIN SCALES - GENERAL: PAINLEVEL_OUTOF10: 0 - NO PAIN

## 2025-05-16 NOTE — PROGRESS NOTES
Patient: Roman Ellis  Room/bed: 102/102-A  Admitted on: 5/15/2025    Age: 50 y.o.   Gender: male  Code Status:  Full Code   Admitting Dx: Alcohol abuse [F10.10]    MRN: [id number]  PCP: Antoine Zarate DO       Subjective   Seen and examined in his room this AM. He is awake and alert, resting in bed. He offers no new complaints at this time and denies withdrawal symptoms of sweating, shaking, dizziness, tremors, etc. He denies chest pain, breathing difficulties, abdominal pain, N/V/D/C, fever, or chills.  He denies thoughts of SI/HI. Upset that he is in the hospital and missing his psychiatry appointment scheduled for 2PM today.     Objective    Physical Exam  Constitutional: A&O x 3; NAD; cooperative; mild restlessness  Eyes: EOM's intact  HEENT: Normocephalic, Atraumatic. Oral mucosa moist.   Neck: Supple. No JVD, lymphadenopathy.   Lungs: CTAB with fair air movement. Respirations even and unlabored on room air.   Heart: RRR  Abdomen: Soft, non-tender, non-distended, +BS  MS/Extremities: BECKWITH equally x 4. No edema. Peripheral pulses intact bilaterally. No asterixis.   Neuro: A&O x3; no focal deficits; gross motor and sensation intact.   Skin: Warm with some diaphoresis. No rashes or lesions  Psych: Normal affect.       Temp:  [36.4 °C (97.5 °F)-37.2 °C (99 °F)] 37.2 °C (99 °F)  Heart Rate:  [] 87  Resp:  [14-18] 14  BP: (128-149)/(76-86) 128/76    Vitals:    05/15/25 2150   Weight: 79.4 kg (175 lb)             I/Os    Intake/Output Summary (Last 24 hours) at 5/16/2025 1210  Last data filed at 5/16/2025 1034  Gross per 24 hour   Intake 240 ml   Output --   Net 240 ml       Labs:   Results from last 72 hours   Lab Units 05/16/25  0651 05/16/25  0047   SODIUM mmol/L 140 139   POTASSIUM mmol/L 4.2 4.1   CHLORIDE mmol/L 103 102   CO2 mmol/L 29 24   BUN mg/dL 10 9   CREATININE mg/dL 0.86 0.90   GLUCOSE mg/dL 117* 102*   CALCIUM mg/dL 8.9 8.9   ANION GAP mmol/L 12 17   EGFR mL/min/1.73m*2 >90 >90      Results from  last 72 hours   Lab Units 05/16/25  0651 05/16/25  0047   WBC AUTO x10*3/uL 8.4 10.9   HEMOGLOBIN g/dL 15.5 15.4   HEMATOCRIT % 44.0 44.5   PLATELETS AUTO x10*3/uL 301 343      Lab Results   Component Value Date    CALCIUM 8.9 05/16/2025      Micro/ID:   No results found for the last 90 days.    Images:  ECG 12 Lead  Normal sinus rhythm  Normal ECG  When compared with ECG of 30-APR-2025 16:27,  No significant change was found       Meds    Scheduled medications  Scheduled Medications[1]  Continuous medications  Continuous Medications[2]  PRN medications  PRN Medications[3]     Assessment and Plan    Roman Ellis is a 50 y.o. male with a medical history of HTN, polysubstance abuse (alcohol, tobacco, and marijuana), and recent U admission (discharged on 5/5/25), who presented to ED due to alcohol intoxication.     Acute Alcohol Intoxication  -H/O alcohol use disorder and had been sober for 3 months prior to this binging episode  -Alcohol level 193 in ED  -Last drink around 7Pm 5/15/25  -Placed on CIWA protocol with Ativan  -On thiamine, folate, MVI  -Monitoring for withdrawal seizure  -Refused IV fluids  -No active withdrawal symptoms at this time and CIWA scores 0-4.   -Will monitor closely  -LSW to follow    Anxiety/Depression  -On Venlafaxine  -Recent U admission  -Psychiatry consulted  -Denies current SI/HI    Hypertension  -Resumed Amlodipine, Lisinopril  -BP stable  -Will titrate as needed.     Nicotine/Marijuana Use Disorder  -Cessation advised   -Nicotine patch ordered    DVT Prophylaxis  -On Lovenox    Fluids/Electrolytes/Nutrition  -Laboratory data reviewed: CBC/BMP.   -Electrolytes stable.   -No nutritional concerns at this time.      Disposition  -Plan of care discussed with attending, Psychiatry APRN, RN.  -Will be evaluated by psychiatry today.  -LSW is following [facility] and advise on outpatient resources for sobriety.       Shayla Lee, KOKI-CNP        [1] amLODIPine, 10  mg, oral, Daily  enoxaparin, 40 mg, subcutaneous, q24h  folic acid, 1 mg, oral, Daily  lisinopril, 10 mg, oral, Daily  multivitamin with minerals, 1 tablet, oral, Daily  nicotine, 1 patch, transdermal, Daily  sodium chloride, 1,000 mL, intravenous, Once  thiamine, 100 mg, oral, Daily  venlafaxine XR, 75 mg, oral, Daily  [2]    [3] PRN medications: LORazepam **OR** LORazepam **OR** LORazepam, polyethylene glycol

## 2025-05-16 NOTE — CARE PLAN
The patient's goals for the shift include      The clinical goals for the shift include pt will be free from injury by end of shift    Over the shift, the patient did not make progress toward the following goals. Barriers to progression include acuteness of illness. Recommendations to address these barriers include hourly rounding and administration of prescribed treatments.
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
## 2025-05-16 NOTE — PROGRESS NOTES
05/16/25 1000   Referral Data   Referral Source Self referral   Referral Reason Follow up   County Information   County [place] Residence [place]   Patient Information   Lives With spouse   Family Member Substance Use   Substance Use Pt admitted for detox; [facility].  BRANDI unable to see pt due to sitter and being somnolent.  SW will follow.

## 2025-05-16 NOTE — ED PROVIDER NOTES
History/Exam limitations: none  HPI was provided by patient    HPI:    Chief Complaint   Patient presents with    Alcohol Intoxication     Pt in through triage with wife with c/o alcohol intoxication. Pt has hx of alcohol abuse, has been sober since February of this year. Began drinking again this Saturday and has been drinking daily since. Pt states he had 18 beers today, the last being immediately before arriving. Pt has a psych hx, was admitted to [facility] this April. Pt denies SI/HI. States he is hear because wife threatened to divorce him if he did not quit drinking.         Roman Ellis is a 50 y.o. male presents with chief complaint of alcohol intoxication.  HPI noted above.  Continues to deny suicidal or homicidal ideation or auditory visual hallucinations.  States he had about 8 alcoholic beverages tonight not 18 as described in triage.  Denies any history of seizures from withdrawal.  Is at this time seeking care for withdrawal.  [facility]   Patient's complaints have been constant without any alleviating or exacerbating factors.       ROS: (Bolded text if patient is positive for) All other review of systems are negative except as noted above and HPI or ROS.   CONSTITUTIONAL fever, chills.  ENT sore throat, congestion, rhinorrhea.  CARDIOVASCULAR chest pain, palpitations.  RESPIRATORY cough, shortness of breath, wheeze.  GI abdominal pain, nausea, vomiting, diarrhea.  GENITOURINARY dysuria, hematuria, frequency.  MUSCULOSKELETAL deformity, neck pain.  SKIN rash, color change.  NEUROLOGIC headache, numbness, focal weakness.  NOTES: All systems reviewed, negative except as described above.       Physical Exam:  GENERAL: Alert, oriented , cooperative,  in no acute distress.  HEAD: normocephalic, atraumatic  SKIN: Intact,  dry skin, no lesions.  EYES: PERRL, EOMs intact,  Conjunctiva pink with no erythema or exudates. No scleral icterus.   ENT: No external deformities. Nares patent,  mucus membranes moist.  Pharynx clear, uvula midline.   NECK: Supple, without meningismus. Trachea at midline. No lymphadenopathy.  PULMONARY: Clear bilaterally. No rales, rhonchi or wheezing.   CARDIAC: Regular rate and rhythm. good pulses.  ABDOMEN: Soft, nontender, active bowel sounds.  No palpable organomegaly.  No rebound or guarding.  No CVA tenderness.  MUSCULOSKELETAL: Full range of motion, no deformity. Pulses full and equal. No EDEMA  NEUROLOGICAL:  CN II through XII are grossly intact, no focal neuro deficits.  Strength 5 out of 5 throughout bilateral upper and lower extremities neurovascular intact in bilateral upper and lower extremities  PSYCHIATRIC: Appropriate mood and affect. Calm.     Past Medical History  He has a past medical history of Fever (02/12/2025) and History of laparoscopic appendectomy (02/12/2025).    Surgical History  He has no past surgical history on file.     Social History  He reports that he has been smoking cigarettes. He has never used smokeless tobacco. He reports that he does not currently use alcohol. He reports current drug use. Drug: Marijuana.   Current Outpatient Medications   Medication Instructions    amLODIPine (Norvasc) 10 mg tablet TAKE 1 TABLET BY MOUTH ONE TIME DAILY    lisinopril 10 mg tablet TAKE 1 TABLET BY MOUTH ONCE DAILY    nicotine polacrilex (NICORETTE) 2 mg, Mouth/Throat, Every 2 hour PRN    venlafaxine XR (Effexor-XR) 75 mg 24 hr capsule Take 1 capsule by mouth once a day with food    venlafaxine XR (EFFEXOR-XR) 75 mg, oral, Daily, Do not crush or chew.     RX Allergies[1]      ED Triage Vitals [05/15/25 2150]   Temperature Heart Rate Respirations BP   36.4 °C (97.5 °F) (!) 104 18 149/84      Pulse Ox Temp Source Heart Rate Source Patient Position   96 % Temporal Monitor Sitting      BP Location FiO2 (%)     Left arm --                   Labs and Imaging  No orders to display     Labs Reviewed   DRUG SCREEN, URINE WITH REFLEX TO CONFIRMATION - Abnormal        Result Value    Amphetamine Screen, Urine Presumptive Negative      Barbiturate Screen, Urine Presumptive Negative      Benzodiazepines Screen, Urine Presumptive Negative      Cannabinoid Screen, Urine Presumptive Positive (*)     Cocaine Metabolite Screen, Urine Presumptive Negative      Fentanyl Screen, Urine Presumptive Negative      Opiate Screen, Urine Presumptive Negative      Oxycodone Screen, Urine Presumptive Negative      PCP Screen, Urine Presumptive Negative      Methadone Screen, Urine Presumptive Negative      Narrative:     Drug screen results are presumptive and should not be used to assess   compliance with prescribed medication. Definitive confirmatory drug testing   has been added to this sample for any positive screen result and will be  reported separately.     Toxicology screening results are reported qualitatively. The concentration must  be greater than or equal to the cutoff to be reported as positive. The concentration   at which the screening test can detect an individual drug or metabolite varies.   The absence of expected drug(s) and/or drug metabolite(s) may indicate non-compliance,   inappropriate timing of specimen collection relative to drug administration, poor drug   absorption, diluted/adulterated urine, or limitations of testing. For medical purposes   only; not valid for forensic use.    Interpretive questions should be directed to the laboratory medical directors.   COMPREHENSIVE METABOLIC PANEL - Abnormal    Glucose 102 (*)     Sodium 139      Potassium 4.1      Chloride 102      Bicarbonate 24      Anion Gap 17      Urea Nitrogen 9      Creatinine 0.90      eGFR >90      Calcium 8.9      Albumin 4.6      Alkaline Phosphatase 43      Total Protein 7.2      AST 41 (*)     Bilirubin, Total 0.5      ALT 42     ALCOHOL - Abnormal    Alcohol 193 (*)    CBC - Normal    WBC 10.9      nRBC 0.0      RBC 5.07      Hemoglobin 15.4      Hematocrit 44.5      MCV 88      MCH 30.4       MCHC 34.6      RDW 13.7      Platelets 343     MAGNESIUM - Normal    Magnesium 2.35     PROTIME-INR - Normal    Protime 11.2      INR 1.0     THC (MARIJUANA), URINE, CONFIRMATION         Medical Decision Making:     The patient presented for evaluation for alcohol intoxication/Treatment.  Differential include but not limited to alcohol abuse, withdrawal.          External Records Reviewed: I reviewed recent and relevant outside records    ED Course as of 05/16/25 0212   Thu May 15, 2025   2358 Alcohol(!): 193 [[facility]]   Fri May 16, 2025   0021 EKG interpreted by me shows normal sinus rhythm no STEMI rate 84  QRS 82 QTc 449.  Compared to prior sinus rhythm has replaced sinus tachycardia [[facility]]   0156 [facility] not [facility] as [facility].  Plan be for admission here. [[facility]]   0211 Spoke with Dr De La Cruz agreed on admission patient was placed in [facility] given folic acid multivitamin vitamin B1 here [[facility]]      ED Course User Index  [[facility]] Alex Sewell, DO         Diagnoses as of 05/16/25 0212   Alcohol abuse         No orders to display     Labs Reviewed   DRUG SCREEN, URINE WITH REFLEX TO CONFIRMATION - Abnormal       Result Value    Amphetamine Screen, Urine Presumptive Negative      Barbiturate Screen, Urine Presumptive Negative      Benzodiazepines Screen, Urine Presumptive Negative      Cannabinoid Screen, Urine Presumptive Positive (*)     Cocaine Metabolite Screen, Urine Presumptive Negative      Fentanyl Screen, Urine Presumptive Negative      Opiate Screen, Urine Presumptive Negative      Oxycodone Screen, Urine Presumptive Negative      PCP Screen, Urine Presumptive Negative      Methadone Screen, Urine Presumptive Negative      Narrative:     Drug screen results are presumptive and should not be used to assess   compliance with prescribed medication. Definitive confirmatory drug testing   has been added to this sample for any positive screen result and will be  reported  separately.     Toxicology screening results are reported qualitatively. The concentration must  be greater than or equal to the cutoff to be reported as positive. The concentration   at which the screening test can detect an individual drug or metabolite varies.   The absence of expected drug(s) and/or drug metabolite(s) may indicate non-compliance,   inappropriate timing of specimen collection relative to drug administration, poor drug   absorption, diluted/adulterated urine, or limitations of testing. For medical purposes   only; not valid for forensic use.    Interpretive questions should be directed to the laboratory medical directors.   COMPREHENSIVE METABOLIC PANEL - Abnormal    Glucose 102 (*)     Sodium 139      Potassium 4.1      Chloride 102      Bicarbonate 24      Anion Gap 17      Urea Nitrogen 9      Creatinine 0.90      eGFR >90      Calcium 8.9      Albumin 4.6      Alkaline Phosphatase 43      Total Protein 7.2      AST 41 (*)     Bilirubin, Total 0.5      ALT 42     ALCOHOL - Abnormal    Alcohol 193 (*)    CBC - Normal    WBC 10.9      nRBC 0.0      RBC 5.07      Hemoglobin 15.4      Hematocrit 44.5      MCV 88      MCH 30.4      MCHC 34.6      RDW 13.7      Platelets 343     MAGNESIUM - Normal    Magnesium 2.35     PROTIME-INR - Normal    Protime 11.2      INR 1.0     THC (MARIJUANA), URINE, CONFIRMATION           Procedure  Procedures           Patient requires continued workup and management of their symptoms and will be admitted to the hospital for further evaluation and treatment.        I discussed the differential, results and plan with the patient and/or family/friend/caregiver if present.        Note: This note was dictated by speech recognition. Minor errors in transcription may be present.             [1] No Known Allergies       Alex Sewell DO  05/16/25 0212
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
## 2025-05-16 NOTE — PROGRESS NOTES
05/16/25 1500   Patient Information   Provides Primary Care For No One   Support System Immediate family   Family Member Substance Use   Reported Type of Substance Alcohol;Street Drug   Reported Level of Alcohol Use Addiction/Dependency   Reported Level of Street Drug/Medication/Inhalant Use Addiction/Dependency   Activities of Daily Living   Functional Status Independent   Living Arrangement House   Bathing/Grooming No assistance   Behavior Oriented   Communication Talks;Understands speaking;Understands English   Emotional/ Psychological   Person Assessed Patient   Affect No Deficits Noted   Behaviors/Mood Anxious;Cooperative;Pleasant   Verbal Skills No Deficits Noted   Previous Mental Health Treatment Inpatient Treatment     BRANDI met with pt who is agreeable to inpatient treatment; and requests that SW speak with spouse.  SW spoke to spouse who would like for pt to receive inpatient psych treatment prior to going to dual dx inpatient.  SW advised that per psych, pt does not meet criteria for inpatient psych.  SW spoke to psych and contacted wife to confirm that psych will not change the recommendation and pt requires inpatient drug/alcohol treatment.  Per spouse, [facility]  She states he served in the 90's and was discharged.  She states he has never been to the [facility] for any [facility] and appt that was scheduled for today is with [facility].  Spouse has rescheduled that appt.  Spouse has significant concerns about safety of family if pt were to discharge home.  Spouse states she spoke to [facility] who needs to speak with pt on the phone but who can potentially accept.  BRANDI left message at [facility] per spouse request ([phone] #[phone]).  BRANDI will follow.    1600:  BRANDI rec'd call back from [facility]; BRANDI provided add'l information and faxed clinical.  [facility] to contact pt to complete assessment.  BRANDI updated pt who is in agreement for placement and agrees to do the phone intake.  BRANDI updated spouse; will  follow.

## 2025-05-17 VITALS
OXYGEN SATURATION: 88 % | HEART RATE: 75 BPM | TEMPERATURE: 98.2 F | HEIGHT: 69 IN | WEIGHT: 175 LBS | SYSTOLIC BLOOD PRESSURE: 158 MMHG | DIASTOLIC BLOOD PRESSURE: 90 MMHG | BODY MASS INDEX: 25.92 KG/M2 | RESPIRATION RATE: 18 BRPM

## 2025-05-17 LAB
ANION GAP SERPL CALC-SCNC: 14 MMOL/L (ref 10–20)
BUN SERPL-MCNC: 16 MG/DL (ref 6–23)
CALCIUM SERPL-MCNC: 8.7 MG/DL (ref 8.6–10.3)
CHLORIDE SERPL-SCNC: 103 MMOL/L (ref 98–107)
CO2 SERPL-SCNC: 28 MMOL/L (ref 21–32)
CREAT SERPL-MCNC: 0.95 MG/DL (ref 0.5–1.3)
EGFRCR SERPLBLD CKD-EPI 2021: >90 ML/MIN/1.73M*2
ERYTHROCYTE [DISTWIDTH] IN BLOOD BY AUTOMATED COUNT: 13.7 % (ref 11.5–14.5)
GLUCOSE SERPL-MCNC: 168 MG/DL (ref 74–99)
HCT VFR BLD AUTO: 46.1 % (ref 41–52)
HGB BLD-MCNC: 15.4 G/DL (ref 13.5–17.5)
MAGNESIUM SERPL-MCNC: 2.19 MG/DL (ref 1.6–2.4)
MCH RBC QN AUTO: 30 PG (ref 26–34)
MCHC RBC AUTO-ENTMCNC: 33.4 G/DL (ref 32–36)
MCV RBC AUTO: 90 FL (ref 80–100)
NRBC BLD-RTO: 0 /100 WBCS (ref 0–0)
PLATELET # BLD AUTO: 297 X10*3/UL (ref 150–450)
POTASSIUM SERPL-SCNC: 4.6 MMOL/L (ref 3.5–5.3)
RBC # BLD AUTO: 5.13 X10*6/UL (ref 4.5–5.9)
SODIUM SERPL-SCNC: 140 MMOL/L (ref 136–145)
WBC # BLD AUTO: 8.5 X10*3/UL (ref 4.4–11.3)

## 2025-05-17 PROCEDURE — 36415 COLL VENOUS BLD VENIPUNCTURE: CPT | Performed by: NURSE PRACTITIONER

## 2025-05-17 PROCEDURE — 83735 ASSAY OF MAGNESIUM: CPT

## 2025-05-17 PROCEDURE — 2500000002 HC RX 250 W HCPCS SELF ADMINISTERED DRUGS (ALT 637 FOR MEDICARE OP, ALT 636 FOR OP/ED)

## 2025-05-17 PROCEDURE — 2500000001 HC RX 250 WO HCPCS SELF ADMINISTERED DRUGS (ALT 637 FOR MEDICARE OP)

## 2025-05-17 PROCEDURE — 80048 BASIC METABOLIC PNL TOTAL CA: CPT | Performed by: NURSE PRACTITIONER

## 2025-05-17 PROCEDURE — 2500000001 HC RX 250 WO HCPCS SELF ADMINISTERED DRUGS (ALT 637 FOR MEDICARE OP): Performed by: STUDENT IN AN ORGANIZED HEALTH CARE EDUCATION/TRAINING PROGRAM

## 2025-05-17 PROCEDURE — 85027 COMPLETE CBC AUTOMATED: CPT | Performed by: NURSE PRACTITIONER

## 2025-05-17 PROCEDURE — 99239 HOSP IP/OBS DSCHRG MGMT >30: CPT

## 2025-05-17 PROCEDURE — 2500000004 HC RX 250 GENERAL PHARMACY W/ HCPCS (ALT 636 FOR OP/ED): Mod: JZ

## 2025-05-17 RX ORDER — IBUPROFEN 200 MG
1 TABLET ORAL DAILY
Qty: 30 PATCH | Refills: 0 | Status: SHIPPED | OUTPATIENT
Start: 2025-05-18 | End: 2025-06-17

## 2025-05-17 RX ORDER — LANOLIN ALCOHOL/MO/W.PET/CERES
100 CREAM (GRAM) TOPICAL DAILY
Qty: 30 TABLET | Refills: 11 | Status: SHIPPED | OUTPATIENT
Start: 2025-05-18

## 2025-05-17 RX ORDER — FOLIC ACID 1 MG/1
1 TABLET ORAL DAILY
Qty: 30 TABLET | Refills: 11 | Status: SHIPPED | OUTPATIENT
Start: 2025-05-18

## 2025-05-17 RX ORDER — MULTIVIT-MIN/IRON FUM/FOLIC AC 7.5 MG-4
1 TABLET ORAL DAILY
Qty: 30 TABLET | Refills: 11 | Status: SHIPPED | OUTPATIENT
Start: 2025-05-18

## 2025-05-17 RX ADMIN — VENLAFAXINE HYDROCHLORIDE 75 MG: 75 CAPSULE, EXTENDED RELEASE ORAL at 09:41

## 2025-05-17 RX ADMIN — ENOXAPARIN SODIUM 40 MG: 40 INJECTION SUBCUTANEOUS at 09:42

## 2025-05-17 RX ADMIN — FOLIC ACID 1 MG: 1 TABLET ORAL at 09:41

## 2025-05-17 RX ADMIN — LISINOPRIL 10 MG: 10 TABLET ORAL at 09:41

## 2025-05-17 RX ADMIN — THIAMINE HCL TAB 100 MG 100 MG: 100 TAB at 09:42

## 2025-05-17 RX ADMIN — Medication 1 TABLET: at 09:42

## 2025-05-17 RX ADMIN — AMLODIPINE BESYLATE 10 MG: 10 TABLET ORAL at 09:42

## 2025-05-17 ASSESSMENT — LIFESTYLE VARIABLES
AUDITORY DISTURBANCES: NOT PRESENT
TREMOR: NO TREMOR
ANXIETY: NO ANXIETY, AT EASE
BLOOD PRESSURE: 158/90
VISUAL DISTURBANCES: NOT PRESENT
TREMOR: NO TREMOR
ANXIETY: MILDLY ANXIOUS
PAROXYSMAL SWEATS: 2
PAROXYSMAL SWEATS: BARELY PERCEPTIBLE SWEATING, PALMS MOIST
VISUAL DISTURBANCES: NOT PRESENT
ORIENTATION AND CLOUDING OF SENSORIUM: ORIENTED AND CAN DO SERIAL ADDITIONS
NAUSEA AND VOMITING: NO NAUSEA AND NO VOMITING
AUDITORY DISTURBANCES: NOT PRESENT
PAROXYSMAL SWEATS: BARELY PERCEPTIBLE SWEATING, PALMS MOIST
PAROXYSMAL SWEATS: BARELY PERCEPTIBLE SWEATING, PALMS MOIST
TREMOR: NO TREMOR
ANXIETY: MILDLY ANXIOUS
HEADACHE, FULLNESS IN HEAD: NOT PRESENT
TOTAL SCORE: 2
AGITATION: NORMAL ACTIVITY
VISUAL DISTURBANCES: NOT PRESENT
TOTAL SCORE: 2
ORIENTATION AND CLOUDING OF SENSORIUM: ORIENTED AND CAN DO SERIAL ADDITIONS
AUDITORY DISTURBANCES: NOT PRESENT
HEADACHE, FULLNESS IN HEAD: NOT PRESENT
TOTAL SCORE: 1
NAUSEA AND VOMITING: NO NAUSEA AND NO VOMITING
AGITATION: NORMAL ACTIVITY
NAUSEA AND VOMITING: NO NAUSEA AND NO VOMITING
AUDITORY DISTURBANCES: NOT PRESENT
HEADACHE, FULLNESS IN HEAD: NOT PRESENT
TREMOR: NO TREMOR
AGITATION: NORMAL ACTIVITY
VISUAL DISTURBANCES: NOT PRESENT
HEADACHE, FULLNESS IN HEAD: NOT PRESENT
ORIENTATION AND CLOUDING OF SENSORIUM: ORIENTED AND CAN DO SERIAL ADDITIONS
ORIENTATION AND CLOUDING OF SENSORIUM: ORIENTED AND CAN DO SERIAL ADDITIONS
PULSE: 75
NAUSEA AND VOMITING: NO NAUSEA AND NO VOMITING
ANXIETY: NO ANXIETY, AT EASE
TOTAL SCORE: 2
AGITATION: NORMAL ACTIVITY
PULSE: 85

## 2025-05-17 ASSESSMENT — COGNITIVE AND FUNCTIONAL STATUS - GENERAL
DAILY ACTIVITIY SCORE: 24
MOBILITY SCORE: 24

## 2025-05-17 ASSESSMENT — PAIN SCALES - GENERAL: PAINLEVEL_OUTOF10: 0 - NO PAIN

## 2025-05-17 NOTE — PROGRESS NOTES
05/17/25 1039   Discharge Planning   Assistance Needed Pt accepted at [facility] today for inpt alcohol treatment.  Prescriptions to be sent to [facility] in [place], provider aware.  [facility] to call back with a time they will pick him up.

## 2025-05-17 NOTE — HOSPITAL COURSE
Roman Ellis is a 50 y.o. male w/ PMH of HTN and Polysubstance abuse admitted for Alcohol withdrawal.  Patient has a long history of alcohol abuse but has stopped drinking back in February after going to . He relapsed a week prior to admission and drank 8 beers the day prior to admission. Patient was hemodynamically stable on admission.  Alcohol blood level was elevated at 193 and labs were unremarkable except for UDS positive for marijuana.  During admission patient was monitored for alcohol withdrawal with CIWA protocol.  Scores remain low throughout hospitalization highest around 4.  On day of discharge his CIWA's were around 2 not requiring any Ativan throughout hospital stay including the 24 hours prior to discharge.  Patient was continued on folate, thiamine, multivitamin supplements.  Psych was consulted for depression and anxiety and recommended continuing home Effexor.  Patient was agreeable to discharge to inpatient rehab for AUD.     Patient was hemodynamically stable discharged and discharged to [facility] inpatient rehab facility.  Patient was discharged on home medications with addition of folate, thiamine, mulitvit, and nicotine patch.  Patient was instructed to follow-up with PCP in 5 to 7 days from discharge.  Patient was understanding of plan and agree prior to discharge.

## 2025-05-17 NOTE — DISCHARGE SUMMARY
Discharge Diagnosis  Alcohol abuse           Issues Requiring Follow-Up  AUD    Discharge Meds     Medication List      START taking these medications     folic acid 1 mg tablet; Commonly known as: Folvite; Take 1 tablet (1 mg)   by mouth once daily.; Start taking on: May 18, 2025   multivitamin with minerals tablet; Take 1 tablet by mouth once daily.;   Start taking on: May 18, 2025   nicotine 14 mg/24 hr patch; Commonly known as: Nicoderm CQ; Place 1   patch over 24 hours on the skin once daily.; Start taking on: May 18, 2025   thiamine 100 mg tablet; Commonly known as: Vitamin B-1; Take 1 tablet   (100 mg) by mouth once daily.; Start taking on: May 18, 2025     CHANGE how you take these medications     venlafaxine XR 75 mg 24 hr capsule; Commonly known as: Effexor-XR; Take   1 capsule (75 mg) by mouth once daily. Do not crush or chew.; What   changed: Another medication with the same name was removed. Continue   taking this medication, and follow the directions you see here.     CONTINUE taking these medications     amLODIPine 10 mg tablet; Commonly known as: Norvasc; TAKE 1 TABLET BY   MOUTH ONE TIME DAILY   lisinopril 10 mg tablet; TAKE 1 TABLET BY MOUTH ONCE DAILY   nicotine polacrilex 2 mg gum; Commonly known as: Nicorette; Chew 1 each   (2 mg) every 2 hours if needed for smoking cessation (nicotine craving,   urge to smoke).       Test Results Pending At Discharge  Pending Labs       Order Current Status    THC (Marijuana), Urine, Confirmation In process            Hospital Course  Roman Ellis is a 50 y.o. male w/ PMH of HTN and Polysubstance abuse admitted for Alcohol withdrawal.  Patient has a long history of alcohol abuse but has stopped drinking back in February after going to . He relapsed a week prior to admission and drank 8 beers the day prior to admission. Patient was hemodynamically stable on admission.  Alcohol blood level was elevated at 193 and labs were unremarkable except for UDS positive  for marijuana.  During admission patient was monitored for alcohol withdrawal with CIWA protocol.  Scores remain low throughout hospitalization highest around 4.  On day of discharge his CIWA's were around 2 not requiring any Ativan throughout hospital stay including the 24 hours prior to discharge.  Patient was continued on folate, thiamine, multivitamin supplements.  Psych was consulted for depression and anxiety and recommended continuing home Effexor.  Patient was agreeable to discharge to inpatient rehab for AUD.     Patient was hemodynamically stable discharged and discharged to [facility] inpatient rehab facility.  Patient was discharged on home medications with addition of folate, thiamine, mulitvit, and nicotine patch.  Patient was instructed to follow-up with PCP in 5 to 7 days from discharge.  Patient was understanding of plan and agree prior to discharge.    Pertinent Physical Exam At Time of Discharge  Physical Exam  Cardiovascular:      Rate and Rhythm: Normal rate and regular rhythm.      Pulses: Normal pulses.      Heart sounds: Normal heart sounds.   Pulmonary:      Effort: Pulmonary effort is normal.      Breath sounds: Normal breath sounds.   Abdominal:      General: Abdomen is flat. Bowel sounds are normal.      Palpations: Abdomen is soft.   Neurological:      Mental Status: He is alert.         Outpatient Follow-Up  Future Appointments   Date Time Provider Department Center   12/19/2025  8:00 AM Antoine Zarate DO [id number] [facility]         Dioni Adan DO

## 2025-05-19 ENCOUNTER — APPOINTMENT (OUTPATIENT)
Dept: CARDIOLOGY | Facility: HOSPITAL | Age: 50
End: 2025-05-19
Payer: COMMERCIAL

## 2025-05-19 ENCOUNTER — HOSPITAL ENCOUNTER (EMERGENCY)
Facility: HOSPITAL | Age: 50
Discharge: PSYCHIATRIC OR SUBSTANCE USE FACILITY | End: 2025-05-20
Attending: STUDENT IN AN ORGANIZED HEALTH CARE EDUCATION/TRAINING PROGRAM
Payer: COMMERCIAL

## 2025-05-19 DIAGNOSIS — Z00.8 ENCOUNTER FOR PSYCHOLOGICAL EVALUATION: Primary | ICD-10-CM

## 2025-05-19 LAB
ALBUMIN SERPL BCP-MCNC: 4.8 G/DL (ref 3.4–5)
ALP SERPL-CCNC: 50 U/L (ref 33–120)
ALT SERPL W P-5'-P-CCNC: 44 U/L (ref 10–52)
AMPHETAMINES UR QL SCN: ABNORMAL
ANION GAP SERPL CALC-SCNC: 16 MMOL/L (ref 10–20)
APAP SERPL-MCNC: <10 UG/ML (ref ?–30)
AST SERPL W P-5'-P-CCNC: 55 U/L (ref 9–39)
BARBITURATES UR QL SCN: ABNORMAL
BASOPHILS # BLD AUTO: 0.03 X10*3/UL (ref 0–0.1)
BASOPHILS NFR BLD AUTO: 0.3 %
BENZODIAZ UR QL SCN: ABNORMAL
BILIRUB SERPL-MCNC: 0.5 MG/DL (ref 0–1.2)
BUN SERPL-MCNC: 10 MG/DL (ref 6–23)
BZE UR QL SCN: ABNORMAL
CALCIUM SERPL-MCNC: 9.1 MG/DL (ref 8.6–10.3)
CANNABINOIDS UR QL SCN: ABNORMAL
CHLORIDE SERPL-SCNC: 98 MMOL/L (ref 98–107)
CO2 SERPL-SCNC: 21 MMOL/L (ref 21–32)
CREAT SERPL-MCNC: 0.92 MG/DL (ref 0.5–1.3)
EGFRCR SERPLBLD CKD-EPI 2021: >90 ML/MIN/1.73M*2
EOSINOPHIL # BLD AUTO: 0.14 X10*3/UL (ref 0–0.7)
EOSINOPHIL NFR BLD AUTO: 1.3 %
ERYTHROCYTE [DISTWIDTH] IN BLOOD BY AUTOMATED COUNT: 13.2 % (ref 11.5–14.5)
ETHANOL SERPL-MCNC: <10 MG/DL
FENTANYL+NORFENTANYL UR QL SCN: ABNORMAL
GLUCOSE SERPL-MCNC: 105 MG/DL (ref 74–99)
HCT VFR BLD AUTO: 44.1 % (ref 41–52)
HGB BLD-MCNC: 15.5 G/DL (ref 13.5–17.5)
IMM GRANULOCYTES # BLD AUTO: 0.03 X10*3/UL (ref 0–0.7)
IMM GRANULOCYTES NFR BLD AUTO: 0.3 % (ref 0–0.9)
LYMPHOCYTES # BLD AUTO: 1.86 X10*3/UL (ref 1.2–4.8)
LYMPHOCYTES NFR BLD AUTO: 16.8 %
MCH RBC QN AUTO: 30.4 PG (ref 26–34)
MCHC RBC AUTO-ENTMCNC: 35.1 G/DL (ref 32–36)
MCV RBC AUTO: 87 FL (ref 80–100)
METHADONE UR QL SCN: ABNORMAL
MONOCYTES # BLD AUTO: 1.04 X10*3/UL (ref 0.1–1)
MONOCYTES NFR BLD AUTO: 9.4 %
NEUTROPHILS # BLD AUTO: 7.94 X10*3/UL (ref 1.2–7.7)
NEUTROPHILS NFR BLD AUTO: 71.9 %
NRBC BLD-RTO: 0 /100 WBCS (ref 0–0)
OPIATES UR QL SCN: ABNORMAL
OXYCODONE+OXYMORPHONE UR QL SCN: ABNORMAL
PCP UR QL SCN: ABNORMAL
PLATELET # BLD AUTO: 289 X10*3/UL (ref 150–450)
POTASSIUM SERPL-SCNC: 3.7 MMOL/L (ref 3.5–5.3)
PROT SERPL-MCNC: 7.5 G/DL (ref 6.4–8.2)
RBC # BLD AUTO: 5.1 X10*6/UL (ref 4.5–5.9)
SALICYLATES SERPL-MCNC: <3 MG/DL (ref ?–20)
SODIUM SERPL-SCNC: 131 MMOL/L (ref 136–145)
WBC # BLD AUTO: 11 X10*3/UL (ref 4.4–11.3)

## 2025-05-19 PROCEDURE — 84075 ASSAY ALKALINE PHOSPHATASE: CPT

## 2025-05-19 PROCEDURE — 36415 COLL VENOUS BLD VENIPUNCTURE: CPT

## 2025-05-19 PROCEDURE — 2500000002 HC RX 250 W HCPCS SELF ADMINISTERED DRUGS (ALT 637 FOR MEDICARE OP, ALT 636 FOR OP/ED)

## 2025-05-19 PROCEDURE — 99285 EMERGENCY DEPT VISIT HI MDM: CPT | Performed by: STUDENT IN AN ORGANIZED HEALTH CARE EDUCATION/TRAINING PROGRAM

## 2025-05-19 PROCEDURE — 93005 ELECTROCARDIOGRAM TRACING: CPT

## 2025-05-19 PROCEDURE — 80307 DRUG TEST PRSMV CHEM ANLYZR: CPT

## 2025-05-19 PROCEDURE — 80320 DRUG SCREEN QUANTALCOHOLS: CPT

## 2025-05-19 PROCEDURE — 85025 COMPLETE CBC W/AUTO DIFF WBC: CPT

## 2025-05-19 PROCEDURE — 2500000001 HC RX 250 WO HCPCS SELF ADMINISTERED DRUGS (ALT 637 FOR MEDICARE OP)

## 2025-05-19 RX ORDER — LORAZEPAM 1 MG/1
1 TABLET ORAL ONCE
Status: COMPLETED | OUTPATIENT
Start: 2025-05-19 | End: 2025-05-19

## 2025-05-19 RX ORDER — OLANZAPINE 5 MG/1
10 TABLET, FILM COATED ORAL ONCE
Status: COMPLETED | OUTPATIENT
Start: 2025-05-19 | End: 2025-05-19

## 2025-05-19 RX ADMIN — OLANZAPINE 10 MG: 5 TABLET, FILM COATED ORAL at 18:04

## 2025-05-19 RX ADMIN — LORAZEPAM 1 MG: 1 TABLET ORAL at 18:03

## 2025-05-19 SDOH — HEALTH STABILITY: MENTAL HEALTH: BEHAVIORS/MOOD: AGITATED

## 2025-05-19 SDOH — HEALTH STABILITY: MENTAL HEALTH: BEHAVIORS/MOOD: CALM

## 2025-05-19 SDOH — HEALTH STABILITY: MENTAL HEALTH: HAVE YOU WISHED YOU WERE DEAD OR WISHED YOU COULD GO TO SLEEP AND NOT WAKE UP?: NO

## 2025-05-19 SDOH — HEALTH STABILITY: MENTAL HEALTH: BEHAVIORAL HEALTH(WDL): EXCEPTIONS TO WDL

## 2025-05-19 SDOH — HEALTH STABILITY: MENTAL HEALTH: HAVE YOU ACTUALLY HAD ANY THOUGHTS OF KILLING YOURSELF?: NO

## 2025-05-19 SDOH — HEALTH STABILITY: MENTAL HEALTH: FOR HIGH RISK PATIENTS: ALL INTERVENTIONS ABOVE, PLUS:

## 2025-05-19 SDOH — HEALTH STABILITY: MENTAL HEALTH: HAVE YOU EVER DONE ANYTHING, STARTED TO DO ANYTHING, OR PREPARED TO DO ANYTHING TO END YOUR LIFE?: NO

## 2025-05-19 SDOH — HEALTH STABILITY: MENTAL HEALTH: SUICIDE ASSESSMENT: ADULT (C-SSRS)

## 2025-05-19 SDOH — SOCIAL STABILITY: SOCIAL INSECURITY: FAMILY BEHAVIORS: APPROPRIATE FOR SITUATION

## 2025-05-19 ASSESSMENT — PAIN SCALES - GENERAL
PAINLEVEL_OUTOF10: 0 - NO PAIN
PAINLEVEL_OUTOF10: 0 - NO PAIN

## 2025-05-19 ASSESSMENT — LIFESTYLE VARIABLES
VISUAL DISTURBANCES: NOT PRESENT
AGITATION: NORMAL ACTIVITY
TREMOR: NO TREMOR
AGITATION: SOMEWHAT MORE THAN NORMAL ACTIVITY
AUDITORY DISTURBANCES: NOT PRESENT
TOTAL SCORE: 8
HEADACHE, FULLNESS IN HEAD: NOT PRESENT
ORIENTATION AND CLOUDING OF SENSORIUM: ORIENTED AND CAN DO SERIAL ADDITIONS
NAUSEA AND VOMITING: NO NAUSEA AND NO VOMITING
VISUAL DISTURBANCES: NOT PRESENT
ANXIETY: NO ANXIETY, AT EASE
TREMOR: 3
PAROXYSMAL SWEATS: NO SWEAT VISIBLE
TOTAL SCORE: 0
NAUSEA AND VOMITING: NO NAUSEA AND NO VOMITING
HEADACHE, FULLNESS IN HEAD: NOT PRESENT
ORIENTATION AND CLOUDING OF SENSORIUM: ORIENTED AND CAN DO SERIAL ADDITIONS
ANXIETY: 3
AUDITORY DISTURBANCES: NOT PRESENT
PAROXYSMAL SWEATS: BARELY PERCEPTIBLE SWEATING, PALMS MOIST

## 2025-05-19 ASSESSMENT — PAIN - FUNCTIONAL ASSESSMENT: PAIN_FUNCTIONAL_ASSESSMENT: 0-10
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
## 2025-05-19 NOTE — ED PROVIDER NOTES
HPI   Chief Complaint   Patient presents with    Psychiatric Evaluation       Patient is a 50-year-old male with past medical history of HTN and polysubstance abuse presenting with wife with desire for medical clearance to go to [facility].  White concern for several months of worsening manic behavior.  Reports he has been agitated, restless, flight of ideas.  Wife is a nurse and first noticed manic behavior.  Endorses no immediate provocation but does endorse she has been using daily marijuana for approximately 2 years and is concerned this may have exacerbated it.  Does report heavy drug use in the past but currently only taking marijuana.  Has been a heavy alcohol user, previously sober with relapse last week prompting admission last Thursday.  Wife and patient endorse no alcohol use since Thursday.  Wife endorsed he was previously staying at detox and psychiatric facility but they were concerned that patient's pacing and agitation was more than they could handle and recommended psychiatric placement prompting presentation to the ED.  Patient does not endorse any other medical complaints and denies any medical symptoms.  Tyrell reports no recent illness.  Did not have any symptoms around time symptoms onset in February.  Does report patient's grandfather had history of early onset dementia but no other family history of psychiatric disease.            Patient History   Medical History[1]  Surgical History[2]  Family History[3]  Social History[4]    Physical Exam   ED Triage Vitals [05/19/25 1615]   Temperature Heart Rate Respirations BP   37.1 °C (98.8 °F) 89 18 (!) 160/101      Pulse Ox Temp src Heart Rate Source Patient Position   99 % -- -- --      BP Location FiO2 (%)     -- --       Physical Exam  Constitutional:       Appearance: Normal appearance.   HENT:      Head: Normocephalic and atraumatic.   Eyes:      Extraocular Movements: Extraocular movements intact.   Cardiovascular:      Rate and Rhythm:  Normal rate.   Pulmonary:      Effort: Pulmonary effort is normal.   Musculoskeletal:         General: Normal range of motion.      Cervical back: Normal range of motion.   Skin:     General: Skin is warm and dry.   Neurological:      General: No focal deficit present.      Mental Status: He is alert and oriented to person, place, and time.   Psychiatric:      Comments: Occasionally confrontational with wife.   Does make good eye contact with animated facial expressions.  Denying SI/HI/AVH.  Speech slightly rapid but generally logical and linear thought processes.           ED Course & MDM   ED Course as of 05/20/25 0948   Tue May 20, 2025   0220 EPAT recommends placement [WL]   0307 Was accepted to [facility] [WL]      ED Course User Index  [WL] Alex Sewell DO         Diagnoses as of 05/20/25 0948   Encounter for psychological evaluation                 No data recorded                                 Medical Decision Making  EKG shows a normal rate of 77bpm, normal sinus rhythm, normal axis,  ms, QRS 82 ms, QTc 459 ms. Normal ST and T wave pattern with no evidence of acute ischemia or other acute findings.    Patient is a 50-year-old male with past medical history of HTN and Polysubstance abuse presenting with wife with desire for medical clearance to go to [facility].  Description of symptoms is concerning for manic behavior and patient does have some agitation on examination here.  Otherwise cooperative.  Offered Zyprexa and Ativan which patient was willing to take.  Otherwise without clear organic cause of symptoms.  There is some suspicion that chronic cannabinoid use may be contributing but do not think that acute intoxication is currently cause of symptoms.  Otherwise with generally benign medical screening examination, no current medical complaints otherwise.  No focal neurological symptoms, no recent infections, no recent traumatic injury.  Does have history of heavy alcohol use but  recently sober for extended period, no alcohol use in 2 days and would not expect withdrawal at this point.  Medically cleared at this time. Not endorsing SI/HI and no clear evidence patient decompensated to point he is unable to care for himself and elopement precautions not placed.  Handed off pending assistance from EPAT.    Patient seen and discussed with Dr. Rebel Tellez MD, PhD  Emergency Medicine PGY3          Procedure  Procedures         [1]   Past Medical History:  Diagnosis Date    Fever 02/12/2025    History of laparoscopic appendectomy 02/12/2025   [2] History reviewed. No pertinent surgical history.  [3]   Family History  Problem Relation Name Age of Onset    Diabetes Mother      Diabetes Father      Hypertension Father      Stroke Maternal Grandfather     [4]   Social History  Tobacco Use    Smoking status: Every Day     Types: Cigarettes    Smokeless tobacco: Never   Vaping Use    Vaping status: Never Used   Substance Use Topics    Alcohol use: Yes     Alcohol/week: 6.0 standard drinks of alcohol     Types: 6 Cans of beer per week    Drug use: Yes     Types: Marijuana        Kanann Tellez MD  Resident  05/20/25 0995

## 2025-05-19 NOTE — ED TRIAGE NOTES
Pt presents ambulatory via POV through triage for medical clearance to go to [facility]. Called [facility] and confirmed they are waiting for EPAT to see him so he can be referred to them. Pt states he has undiagnosed PTSD. Pt denies SI/HI. Pt states he has Hx of ETOH abuse, states his last drink was 2 days ago.

## 2025-05-20 VITALS
HEART RATE: 66 BPM | OXYGEN SATURATION: 100 % | WEIGHT: 170 LBS | TEMPERATURE: 97.7 F | HEIGHT: 69 IN | BODY MASS INDEX: 25.18 KG/M2 | DIASTOLIC BLOOD PRESSURE: 78 MMHG | SYSTOLIC BLOOD PRESSURE: 139 MMHG | RESPIRATION RATE: 15 BRPM

## 2025-05-20 NOTE — PROGRESS NOTES
For full history, physical exam, and prior hospital course, please see previous ED provider note. This is in addition to the primary record.    Chief Complaint   Patient presents with    Psychiatric Evaluation       Comments/Additional Findings: Patient was signed out to me by Dr Mercado at change of shift.   Pending items at this time include EPAT to evaluate        Was accepted to [facility].        ED Course as of 05/20/25 0311   Tue May 20, 2025   0220 EPAT recommends placement [WL]   0307 Was accepted to [facility] [WL]      ED Course User Index  [WL] Alex Sewell,          Diagnoses as of 05/20/25 0311   Encounter for psychological evaluation       No orders to display     Labs Reviewed   CBC WITH AUTO DIFFERENTIAL - Abnormal       Result Value    WBC 11.0      nRBC 0.0      RBC 5.10      Hemoglobin 15.5      Hematocrit 44.1      MCV 87      MCH 30.4      MCHC 35.1      RDW 13.2      Platelets 289      Neutrophils % 71.9      Immature Granulocytes %, Automated 0.3      Lymphocytes % 16.8      Monocytes % 9.4      Eosinophils % 1.3      Basophils % 0.3      Neutrophils Absolute 7.94 (*)     Immature Granulocytes Absolute, Automated 0.03      Lymphocytes Absolute 1.86      Monocytes Absolute 1.04 (*)     Eosinophils Absolute 0.14      Basophils Absolute 0.03     COMPREHENSIVE METABOLIC PANEL - Abnormal    Glucose 105 (*)     Sodium 131 (*)     Potassium 3.7      Chloride 98      Bicarbonate 21      Anion Gap 16      Urea Nitrogen 10      Creatinine 0.92      eGFR >90      Calcium 9.1      Albumin 4.8      Alkaline Phosphatase 50      Total Protein 7.5      AST 55 (*)     Bilirubin, Total 0.5      ALT 44     DRUG SCREEN,URINE - Abnormal    Amphetamine Screen, Urine Presumptive Negative      Barbiturate Screen, Urine Presumptive Negative      Benzodiazepines Screen, Urine Presumptive Negative      Cannabinoid Screen, Urine Presumptive Positive (*)     Cocaine Metabolite Screen, Urine Presumptive  Negative      Fentanyl Screen, Urine Presumptive Negative      Opiate Screen, Urine Presumptive Negative      Oxycodone Screen, Urine Presumptive Negative      PCP Screen, Urine Presumptive Negative      Methadone Screen, Urine Presumptive Negative      Narrative:     Drug screen results are presumptive and should not be used to assess   compliance with prescribed medication. Contact the performing [facility] laboratory   to add-on definitive confirmatory testing if clinically indicated.    Toxicology screening results are reported qualitatively. The concentration must   be greater than or equal to the cutoff to be reported as positive. The concentration   at which the screening test can detect an individual drug or metabolite varies.   The absence of expected drug(s) and/or drug metabolite(s) may indicate non-compliance,   inappropriate timing of specimen collection relative to drug administration, poor drug   absorption, diluted/adulterated urine, or limitations of testing. For medical purposes   only; not valid for forensic use.    Interpretive questions should be directed to the laboratory medical directors.   ACUTE TOXICOLOGY PANEL, BLOOD - Normal    Acetaminophen <10.0      Salicylate  <3      Alcohol <10             Procedure  Procedures             Note: This note was dictated by speech recognition. Minor errors in transcription may be present.

## 2025-05-20 NOTE — SIGNIFICANT EVENT
Application for Emergency Admission      Ready for Transfer?  Is the patient medically cleared for transfer to inpatient psychiatry: Yes  Has the patient been accepted to an inpatient psychiatric hospital: Yes    Application for Emergency Admission  IN ACCORDANCE WITH [place] 5122[place]10 [place]  The Chief Clinical Officer of: [employer] 5/20/2025 .3:09 AM    Reason for Hospitalization  The undersigned has reason to believe that: Roman Ellis Is a mentally ill person subject to hospitalization by court order under division B Section 5122.01 of the Revised Code, i.e., this person:    1.No  Represents a substantial risk of physical harm to self as manifested by evidence of threats of, or attempts at, suicide or serious self-inflicted bodily harm    2.No Represents a substantial risk of physical harm to others as manifested by evidence of recent homicidal or other violent behavior, evidence of recent threats that place another in reasonable fear of violent behavior and serious physical harm, or other evidence of present dangerousness    3.No Represents a substantial and immediate risk of serious physical impairment or injury to self as manifested by  evidence that the person is unable to provide for and is not providing for the person's basic physical needs because of the person's mental illness and that appropriate provision for those needs cannot be made  immediately available in the community    4.No Would benefit from treatment in a hospital for his mental illness and is in need of such treatment as manifested by evidence of behavior that creates a grave and imminent risk to substantial rights of others or  himself.    5.Yes Would benefit from treatment as manifested by evidence of behavior that indicates all of the following:       (a) The person is unlikely to survive safely in the community without supervision, based on a clinical determination.       (b) The person has a history of lack of compliance with  treatment for mental illness and one of the following applies:      (i) At least twice within the thirty-six months prior to the filing of an affidavit seeking court-ordered treatment of the person under section 5122.111 of the Revised Code, the lack of compliance has been a significant factor in necessitating hospitalization in a hospital or receipt of services in a forensic or other mental health unit of a correctional facility, provided that the thirty-six-month period shall be extended by the length of any hospitalization or incarceration of the person that occurred within the thirty-six-month period.      (ii) Within the forty-eight months prior to the filing of an affidavit seeking court-ordered treatment of the person under section 5122.111 of the Revised Code, the lack of compliance resulted in one or more acts of serious violent behavior toward self or others or threats of, or attempts at, serious physical harm to self or others, provided that the forty-eight-month period shall be extended by the length of any hospitalization or incarceration of the person that occurred within the forty-eight-month period.      (c) The person, as a result of mental illness, is unlikely to voluntarily participate in necessary treatment.       (d) In view of the person's treatment history and current behavior, the person is in need of treatment in order to prevent a relapse or deterioration that would be likely to result in substantial risk of serious harm to the person or others.    (e) Represents a substantial risk of physical harm to self or others if allowed to remain at liberty pending examination.    Therefore, it is requested that said person be admitted to the above named facility.    STATEMENT OF BELIEF    Must be filled out by one of the following: a psychiatrist, licensed physician, licensed clinical psychologist, health or ,  or .  (Statement shall include the circumstances under  which the individual was taken into custody and the reason for the person's belief that hospitalization is necessary. The statement shall also include a reference to efforts made to secure the individual's property at his residence if he was taken into custody there. Every reasonable and appropriate effort should be made to take this person into custody in the least conspicuous manner possible.)    Is a 50-year-old male who desires to be medically cleared to go to [facility].  He has had worsening manic behavior.  He has had flight of ideas been agitated and restless.    Alex Sewell, DO 5/20/2025     _____________________________________________________________   Place of Employment: uh    STATEMENT OF OBSERVATION BY PSYCHIATRIST, LICENSED PHYSICIAN, OR LICENSED CLINICAL PSYCHOLOGIST, IF APPLICABLE    Place of Observation (e.g., [facility] mental [facility], general hospital, office, emergency facility)  (If applicable, please complete)    Alex Sewell,  5/20/2025    _____________________________________________________________

## 2025-05-21 LAB — CARBOXYTHC UR-MCNC: 197 NG/ML

## 2025-05-24 LAB
ATRIAL RATE: 77 BPM
P AXIS: 14 DEGREES
P OFFSET: 189 MS
P ONSET: 143 MS
PR INTERVAL: 144 MS
Q ONSET: 215 MS
QRS COUNT: 13 BEATS
QRS DURATION: 82 MS
QT INTERVAL: 406 MS
QTC CALCULATION(BAZETT): 459 MS
QTC FREDERICIA: 441 MS
R AXIS: 85 DEGREES
T AXIS: 60 DEGREES
T OFFSET: 418 MS
VENTRICULAR RATE: 77 BPM

## 2025-05-27 PROCEDURE — RXMED WILLOW AMBULATORY MEDICATION CHARGE

## 2025-05-29 ENCOUNTER — PHARMACY VISIT (OUTPATIENT)
Dept: PHARMACY | Facility: CLINIC | Age: 50
End: 2025-05-29
Payer: COMMERCIAL

## 2025-06-03 ENCOUNTER — APPOINTMENT (OUTPATIENT)
Dept: PRIMARY CARE | Facility: CLINIC | Age: 50
End: 2025-06-03
Payer: COMMERCIAL

## 2025-06-03 VITALS
SYSTOLIC BLOOD PRESSURE: 120 MMHG | HEART RATE: 76 BPM | WEIGHT: 172 LBS | BODY MASS INDEX: 25.48 KG/M2 | HEIGHT: 69 IN | OXYGEN SATURATION: 99 % | DIASTOLIC BLOOD PRESSURE: 80 MMHG

## 2025-06-03 DIAGNOSIS — F10.10 ALCOHOL ABUSE: ICD-10-CM

## 2025-06-03 DIAGNOSIS — F31.70 BIPOLAR AFFECTIVE DISORDER IN REMISSION (MULTI): ICD-10-CM

## 2025-06-03 DIAGNOSIS — R35.0 URINARY FREQUENCY: ICD-10-CM

## 2025-06-03 DIAGNOSIS — F33.1 MODERATE EPISODE OF RECURRENT MAJOR DEPRESSIVE DISORDER: ICD-10-CM

## 2025-06-03 DIAGNOSIS — R29.818 SUSPECTED SLEEP APNEA: Primary | ICD-10-CM

## 2025-06-03 DIAGNOSIS — I10 PRIMARY HYPERTENSION: ICD-10-CM

## 2025-06-03 PROCEDURE — 99214 OFFICE O/P EST MOD 30 MIN: CPT | Performed by: STUDENT IN AN ORGANIZED HEALTH CARE EDUCATION/TRAINING PROGRAM

## 2025-06-03 PROCEDURE — 3079F DIAST BP 80-89 MM HG: CPT | Performed by: STUDENT IN AN ORGANIZED HEALTH CARE EDUCATION/TRAINING PROGRAM

## 2025-06-03 PROCEDURE — 3074F SYST BP LT 130 MM HG: CPT | Performed by: STUDENT IN AN ORGANIZED HEALTH CARE EDUCATION/TRAINING PROGRAM

## 2025-06-03 PROCEDURE — 3008F BODY MASS INDEX DOCD: CPT | Performed by: STUDENT IN AN ORGANIZED HEALTH CARE EDUCATION/TRAINING PROGRAM

## 2025-06-03 RX ORDER — FOLIC ACID 1 MG/1
1 TABLET ORAL DAILY
Qty: 90 TABLET | Refills: 1 | Status: SHIPPED | OUTPATIENT
Start: 2025-06-03

## 2025-06-03 RX ORDER — TAMSULOSIN HYDROCHLORIDE 0.4 MG/1
0.4 CAPSULE ORAL DAILY
Qty: 90 CAPSULE | Refills: 1 | Status: SHIPPED | OUTPATIENT
Start: 2025-06-03

## 2025-06-03 RX ORDER — LISINOPRIL 10 MG/1
10 TABLET ORAL DAILY
Qty: 90 TABLET | Refills: 1 | Status: SHIPPED | OUTPATIENT
Start: 2025-06-03

## 2025-06-03 RX ORDER — VENLAFAXINE HYDROCHLORIDE 75 MG/1
75 CAPSULE, EXTENDED RELEASE ORAL DAILY
Qty: 90 CAPSULE | Refills: 1 | Status: SHIPPED | OUTPATIENT
Start: 2025-06-03

## 2025-06-03 RX ORDER — NALTREXONE HYDROCHLORIDE 50 MG/1
50 TABLET, FILM COATED ORAL NIGHTLY
Qty: 90 TABLET | Refills: 1 | Status: SHIPPED | OUTPATIENT
Start: 2025-06-03

## 2025-06-03 RX ORDER — AMLODIPINE BESYLATE 10 MG/1
10 TABLET ORAL DAILY
Qty: 90 TABLET | Refills: 1 | Status: SHIPPED | OUTPATIENT
Start: 2025-06-03

## 2025-06-03 ASSESSMENT — PATIENT HEALTH QUESTIONNAIRE - PHQ9
2. FEELING DOWN, DEPRESSED OR HOPELESS: NOT AT ALL
1. LITTLE INTEREST OR PLEASURE IN DOING THINGS: NOT AT ALL
SUM OF ALL RESPONSES TO PHQ9 QUESTIONS 1 AND 2: 0

## 2025-06-03 ASSESSMENT — ENCOUNTER SYMPTOMS
SHORTNESS OF BREATH: 0
DYSURIA: 0
SINUS PRESSURE: 0
DYSPHORIC MOOD: 0
WOUND: 0
VOMITING: 0
PALPITATIONS: 0
CONSTIPATION: 0
WHEEZING: 0
COUGH: 0
FATIGUE: 1
FREQUENCY: 0
ARTHRALGIAS: 0
SINUS PAIN: 0
RHINORRHEA: 0
NERVOUS/ANXIOUS: 0
CHILLS: 0
DIARRHEA: 0
MYALGIAS: 0
SLEEP DISTURBANCE: 1
FEVER: 0
NAUSEA: 0

## 2025-06-03 ASSESSMENT — COLUMBIA-SUICIDE SEVERITY RATING SCALE - C-SSRS: 1. IN THE PAST MONTH, HAVE YOU WISHED YOU WERE DEAD OR WISHED YOU COULD GO TO SLEEP AND NOT WAKE UP?: NO

## 2025-06-03 ASSESSMENT — PAIN SCALES - GENERAL: PAINLEVEL_OUTOF10: 0-NO PAIN

## 2025-06-03 NOTE — ASSESSMENT & PLAN NOTE
Orders:    naltrexone (Depade) 50 mg tablet; Take 1 tablet (50 mg) by mouth once daily at bedtime.    folic acid (Folvite) 1 mg tablet; Take 1 tablet (1 mg) by mouth once daily.  Patient reports naltrexone is working well for managing alcohol cravings.  Would like to continue for the moment.  Will plan to discontinue long term once he is ready.

## 2025-06-03 NOTE — ASSESSMENT & PLAN NOTE
Orders:    venlafaxine XR (Effexor-XR) 75 mg 24 hr capsule; Take 1 capsule (75 mg) by mouth once daily.  Patient with more clear diagnosis of bipolar now on Abilify for mood stabilization.    Reports that he is currently feeling well with combination of Abilify of and Effexor.

## 2025-06-03 NOTE — ASSESSMENT & PLAN NOTE
Orders:    lisinopril 10 mg tablet; Take 1 tablet (10 mg) by mouth once daily.    amLODIPine (Norvasc) 10 mg tablet; Take 1 tablet (10 mg) by mouth once daily.  adequately controlled.  Medications refilled.

## 2025-06-03 NOTE — PROGRESS NOTES
"Subjective   Patient ID: Roman Ellis is a 50 y.o. male who presents for Follow-up (Patient is in office today for disability forms  and sleep study order ./I did let the patient know that the disability forms may have to be a separate visit and he understood . ).    Here today requesting testing for JESSICA.  Patient does have snoring, witnessed apneas, daytime somnolence.  Spouse has been on CPAP and he is interested in exploring treatment options.      Patient was in [facility] sober living 5/17-5/19/25.  Then was admitted from 5/20/25-5/28/25.        Review of Systems   Constitutional:  Positive for fatigue. Negative for chills and fever.   HENT:  Negative for congestion, rhinorrhea, sinus pressure and sinus pain.    Respiratory:  Negative for cough, shortness of breath and wheezing.    Cardiovascular:  Negative for chest pain and palpitations.   Gastrointestinal:  Negative for constipation, diarrhea, nausea and vomiting.   Genitourinary:  Negative for dysuria, frequency and urgency.   Musculoskeletal:  Negative for arthralgias and myalgias.   Skin:  Negative for pallor, rash and wound.   Psychiatric/Behavioral:  Positive for sleep disturbance. Negative for dysphoric mood and suicidal ideas. The patient is not nervous/anxious.        Objective     Visit Vitals  /80   Pulse 76   Ht 1.753 m (5' 9\")   Wt 78 kg (172 lb)   SpO2 99%   BMI 25.40 kg/m²   Smoking Status Every Day   BSA 1.95 m²   STOP-BANG = 6      Physical Exam  Constitutional:       Appearance: Normal appearance.   HENT:      Head: Normocephalic and atraumatic.      Right Ear: External ear normal.      Left Ear: External ear normal.      Mouth/Throat:      Mouth: Mucous membranes are moist.      Pharynx: Oropharynx is clear.   Eyes:      Conjunctiva/sclera: Conjunctivae normal.   Cardiovascular:      Rate and Rhythm: Normal rate and regular rhythm.      Heart sounds: Normal heart sounds.   Pulmonary:      Effort: Pulmonary effort is normal.      " Breath sounds: Normal breath sounds.   Skin:     General: Skin is warm and dry.   Neurological:      Mental Status: He is alert.   Psychiatric:         Mood and Affect: Mood normal.         Behavior: Behavior normal.         Assessment & Plan  Suspected sleep apnea    Orders:    Home sleep apnea test (HSAT); Future    Alcohol abuse    Orders:    naltrexone (Depade) 50 mg tablet; Take 1 tablet (50 mg) by mouth once daily at bedtime.    folic acid (Folvite) 1 mg tablet; Take 1 tablet (1 mg) by mouth once daily.  Patient reports naltrexone is working well for managing alcohol cravings.  Would like to continue for the moment.  Will plan to discontinue long term once he is ready.   Primary hypertension    Orders:    lisinopril 10 mg tablet; Take 1 tablet (10 mg) by mouth once daily.    amLODIPine (Norvasc) 10 mg tablet; Take 1 tablet (10 mg) by mouth once daily.  adequately controlled.  Medications refilled.   Moderate episode of recurrent major depressive disorder    Orders:    venlafaxine XR (Effexor-XR) 75 mg 24 hr capsule; Take 1 capsule (75 mg) by mouth once daily.  Patient with more clear diagnosis of bipolar now on Abilify for mood stabilization.    Reports that he is currently feeling well with combination of Abilify of and Effexor.   Urinary frequency    Orders:    tamsulosin (Flomax) 0.4 mg 24 hr capsule; Take 1 capsule (0.4 mg) by mouth once daily.    Bipolar affective disorder in remission (Multi)       Patient requesting review of recent hospital stay for possible short term disability.  Partial records are available and forms are extensive and require a great deal of specific information.  Patient to return for focused visit to go through forms and review exact dates of incapacity and needs for possible leave going forward.          Reviewed and approved by RAYMOND DU on 6/3/25 at 6:56 PM.

## 2025-06-04 ENCOUNTER — OFFICE VISIT (OUTPATIENT)
Dept: PRIMARY CARE | Facility: CLINIC | Age: 50
End: 2025-06-04
Payer: COMMERCIAL

## 2025-06-04 VITALS
HEIGHT: 69 IN | SYSTOLIC BLOOD PRESSURE: 118 MMHG | BODY MASS INDEX: 25.48 KG/M2 | OXYGEN SATURATION: 98 % | DIASTOLIC BLOOD PRESSURE: 66 MMHG | HEART RATE: 79 BPM | WEIGHT: 172 LBS

## 2025-06-04 DIAGNOSIS — F31.70 BIPOLAR AFFECTIVE DISORDER IN REMISSION (MULTI): Primary | ICD-10-CM

## 2025-06-04 PROCEDURE — 3078F DIAST BP <80 MM HG: CPT | Performed by: STUDENT IN AN ORGANIZED HEALTH CARE EDUCATION/TRAINING PROGRAM

## 2025-06-04 PROCEDURE — 99213 OFFICE O/P EST LOW 20 MIN: CPT | Performed by: STUDENT IN AN ORGANIZED HEALTH CARE EDUCATION/TRAINING PROGRAM

## 2025-06-04 PROCEDURE — 3074F SYST BP LT 130 MM HG: CPT | Performed by: STUDENT IN AN ORGANIZED HEALTH CARE EDUCATION/TRAINING PROGRAM

## 2025-06-04 PROCEDURE — 3008F BODY MASS INDEX DOCD: CPT | Performed by: STUDENT IN AN ORGANIZED HEALTH CARE EDUCATION/TRAINING PROGRAM

## 2025-06-04 ASSESSMENT — ENCOUNTER SYMPTOMS
LIGHT-HEADEDNESS: 0
FATIGUE: 0
DIZZINESS: 0
SLEEP DISTURBANCE: 0
WEAKNESS: 0
FEVER: 0
CHILLS: 0
SPEECH DIFFICULTY: 0
DYSPHORIC MOOD: 0
AGITATION: 0
NERVOUS/ANXIOUS: 1
TREMORS: 0
HALLUCINATIONS: 0
NUMBNESS: 0
DECREASED CONCENTRATION: 1

## 2025-06-04 ASSESSMENT — PATIENT HEALTH QUESTIONNAIRE - PHQ9
SUM OF ALL RESPONSES TO PHQ9 QUESTIONS 1 AND 2: 0
2. FEELING DOWN, DEPRESSED OR HOPELESS: NOT AT ALL
1. LITTLE INTEREST OR PLEASURE IN DOING THINGS: NOT AT ALL

## 2025-06-04 ASSESSMENT — PAIN SCALES - GENERAL: PAINLEVEL_OUTOF10: 0-NO PAIN

## 2025-06-04 ASSESSMENT — COLUMBIA-SUICIDE SEVERITY RATING SCALE - C-SSRS: 1. IN THE PAST MONTH, HAVE YOU WISHED YOU WERE DEAD OR WISHED YOU COULD GO TO SLEEP AND NOT WAKE UP?: NO

## 2025-06-04 NOTE — PROGRESS NOTES
"Subjective   Patient ID: Roman Ellis is a 50 y.o. male who presents for Forms/questionnaires (Patient needs FMLA .).  Patient presents today in follow-up.  He is here today accompanied by his wife.  He was recently diagnosed with type I bipolar affective disorder during a manic episode.  He spent the larger part of May in the hospital and is requesting short-term disability or FMLA for that time period.  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        Review of Systems   Constitutional:  Negative for chills, fatigue and fever.   Neurological:  Negative for dizziness, tremors, syncope, speech difficulty, weakness, light-headedness and numbness.   Psychiatric/Behavioral:  Positive for decreased concentration. Negative for agitation, dysphoric mood, hallucinations, self-injury, sleep disturbance and suicidal ideas. The patient is nervous/anxious.        Objective     Visit Vitals  /66   Pulse 79   Ht 1.753 m (5' 9\")   Wt 78 kg (172 lb)   SpO2 98%   BMI 25.40 kg/m²   Smoking Status Every Day   BSA 1.95 m²      Wt Readings from Last 5 Encounters:   06/04/25 78 kg (172 lb)   06/03/25 78 kg (172 lb)   05/19/25 77.1 kg (170 lb)   05/15/25 79.4 kg (175 lb)   05/01/25 78.6 kg (173 lb 4.5 oz)        Physical Exam  Constitutional:       Appearance: Normal appearance. He is normal weight.   HENT:      Head: Normocephalic and atraumatic.      Right Ear: External ear normal.      Left Ear: External ear normal.   Eyes:      Conjunctiva/sclera: Conjunctivae normal.   Cardiovascular:      Rate and Rhythm: Normal rate and regular rhythm.      Heart sounds: Normal heart sounds.   Pulmonary:      Effort: Pulmonary effort is normal.      Breath sounds: Normal breath sounds.   Skin:     General: Skin is warm and dry.   Neurological:      Mental Status: He is alert and oriented to person, place, and time.      GCS: GCS eye subscore is 4. GCS verbal subscore is 5. GCS motor subscore is 6.      Motor: Motor function is " intact.      Gait: Gait is intact.   Psychiatric:         Attention and Perception: Attention and perception normal.         Mood and Affect: Mood normal.         Speech: Speech is not rapid and pressured or delayed.         Behavior: Behavior is hyperactive. Behavior is not aggressive or combative. Behavior is cooperative.         Thought Content: Thought content normal. Thought content is not paranoid or delusional. Thought content does not include homicidal or suicidal ideation.         Cognition and Memory: Cognition normal. He exhibits impaired recent memory. He does not exhibit impaired remote memory.         Judgment: Judgment normal.         Assessment & Plan  Bipolar affective disorder in remission (Multi)       Patient currently doing relatively well.  He does not appear to be quite at baseline, however he is not acutely manic.  He does display good insight into disease process.  He expresses understanding of need to continue with mood stabilization medication.  Currently he is on Abilify 5 mg every morning and 10 mg nightly.  He has been compliant with his medication.  He had also been prescribed trazodone nightly from the psychiatric hospital.  States he had missed some doses of this and been able to sleep without it so had stopped taking it.  I did advise him that it is okay not to take the trazodone, however he absolutely must continue taking his Abilify unless instructed not to by his psychiatrist.  Patient did express understanding.    We did review his short-term disability paperwork.  He is recovering reasonably well, however he is not yet back at baseline.  He did demonstrate normal cognition, however he did have notably decreased focus and concentration throughout.  Given that his job requires him to be to focus for several hours at a time, do not know that he is able to return to work as of this moment.  However, if he continues to work consistently with psychiatry, I suspect he will be able to  return in the very near future.  I did complete disability paperwork with him today to cover the duration of his recent hospital stays as well as for the next 2 weeks to include his upcoming 2 psychiatry appointments and psychology appointment.  Any extension beyond that will be at the discretion of his behavioral health providers.       Reviewed and approved by RAYMOND DU on 6/4/25 at 9:05 PM.

## 2025-06-05 NOTE — ASSESSMENT & PLAN NOTE
Patient currently doing relatively well.  He does not appear to be quite at baseline, however he is not acutely manic.  He does display good insight into disease process.  He expresses understanding of need to continue with mood stabilization medication.  Currently he is on Abilify 5 mg every morning and 10 mg nightly.  He has been compliant with his medication.  He had also been prescribed trazodone nightly from the psychiatric hospital.  States he had missed some doses of this and been able to sleep without it so had stopped taking it.  I did advise him that it is okay not to take the trazodone, however he absolutely must continue taking his Abilify unless instructed not to by his psychiatrist.  Patient did express understanding.    We did review his short-term disability paperwork.  He is recovering reasonably well, however he is not yet back at baseline.  He did demonstrate normal cognition, however he did have notably decreased focus and concentration throughout.  Given that his job requires him to be to focus for several hours at a time, do not know that he is able to return to work as of this moment.  However, if he continues to work consistently with psychiatry, I suspect he will be able to return in the very near future.  I did complete disability paperwork with him today to cover the duration of his recent hospital stays as well as for the next 2 weeks to include his upcoming 2 psychiatry appointments and psychology appointment.  Any extension beyond that will be at the discretion of his behavioral health providers.

## 2025-06-06 ENCOUNTER — CLINICAL SUPPORT (OUTPATIENT)
Dept: SLEEP MEDICINE | Facility: CLINIC | Age: 50
End: 2025-06-06
Payer: COMMERCIAL

## 2025-06-06 DIAGNOSIS — R29.818 SUSPECTED SLEEP APNEA: ICD-10-CM

## 2025-06-06 NOTE — PROGRESS NOTES
Type of Study: HOME SLEEP STUDY - NOMAD     The patient received equipment and instructions for use of the [employer] Nomad HSAT device. The patient was instructed how to apply the effort belts, cannula, thermistor. It was also explained how the Nomad and oximeter components work.  The patient was asked to record their sleep for at least a 6 hour period.     The patient was informed of their responsibility for the device and acknowledged this by signing the HSAT device contract. The patient was asked to return the device by 6/9/2025 between the hours of 8am to 3pm to the Sleep Center in [facility].     The patient was instructed to call 911 as usual for any medical- emergencies while at home.  The patient was also given a phone number for troubleshooting when using the device in case there were additional questions.

## 2025-06-16 ENCOUNTER — PATIENT OUTREACH (OUTPATIENT)
Dept: CARE COORDINATION | Facility: CLINIC | Age: 50
End: 2025-06-16
Payer: COMMERCIAL

## 2025-06-16 SDOH — ECONOMIC STABILITY: GENERAL: WOULD YOU LIKE HELP WITH ANY OF THE FOLLOWING NEEDS?: I DONT NEED HELP WITH ANY OF THESE

## 2025-06-16 NOTE — PROGRESS NOTES
Outreach call to patient to support a smooth transition of care from recent admission.    Spoke with patient, reviewed updates post discharge.  PT confirmed psychiatry and side effects with medications.   The pt. confirmed secured housing, and no resource insecurities at this time.   Medications  Medications reviewed with patient/caregiver?: Yes (6/16/2025  3:48 PM)  Is the patient having any side effects they believe may be caused by any medication additions or changes?: (!) Yes (6/16/2025  3:48 PM)  Does the patient have all medications ordered at discharge?: Yes (6/16/2025  3:48 PM)  Is the patient taking all medications as directed (includes completed medication regime)?: Yes (6/16/2025  3:48 PM)    Appointments  Does the patient have a primary care provider?: Yes (6/16/2025  3:48 PM)  Has the patient kept scheduled appointments due by today?: Yes (6/16/2025  3:48 PM)    Patient Teaching  Does the patient have access to their discharge instructions?: Yes (6/16/2025  3:48 PM)  What is the patient's perception of their health status since discharge?: Returned to baseline/stable (6/16/2025  3:48 PM)        No other follow up requested at this time.   SHORTY Whaley  O    Contact: [phone]

## 2025-06-17 ENCOUNTER — TELEPHONE (OUTPATIENT)
Dept: PRIMARY CARE | Facility: CLINIC | Age: 50
End: 2025-06-17
Payer: COMMERCIAL

## 2025-06-17 PROCEDURE — RXMED WILLOW AMBULATORY MEDICATION CHARGE

## 2025-06-17 NOTE — TELEPHONE ENCOUNTER
Ron called pt wife, [phone] he was given Naltrexone when he was in the psych hospital, the psych NP who prescribes Abilify states she Is not able to prescribe, can TORY prescribe for him ?

## 2025-06-17 NOTE — TELEPHONE ENCOUNTER
Called Patient to schedule an appointment for Sleep apnea review. Patient declined to schedule an appointment he stated he's going to start with a mouth guard, he's looking for a dentist in his network.

## 2025-06-17 NOTE — TELEPHONE ENCOUNTER
I already refilled his naltrexone last time I saw him.  No idea why anyone who says they're in psych would not be able to prescribe it though.     If they have trouble picking it up, let me know and I can send it again.

## 2025-06-19 ENCOUNTER — PHARMACY VISIT (OUTPATIENT)
Dept: PHARMACY | Facility: CLINIC | Age: 50
End: 2025-06-19
Payer: COMMERCIAL

## 2025-06-23 ENCOUNTER — TELEPHONE (OUTPATIENT)
Dept: PRIMARY CARE | Facility: CLINIC | Age: 50
End: 2025-06-23
Payer: COMMERCIAL

## 2025-06-23 NOTE — TELEPHONE ENCOUNTER
Patient's wife called because patient tried going back to work today because his short term disability date to go back was today. He has been unable to keep an appointment to get mood stabilizer medications because every time something is scheduled, the provider cancels or reschedules the appointment. He now has an appointment on 7/23/2025, and is requesting that the short term disability be addended to that date. He tried going back to work today and was nervous, pacing, and can't sit still at a desk for an 8 hour work day. Please advise.  Please call patient's wife at [phone] to update on whether or not this can be done.

## 2025-06-24 NOTE — TELEPHONE ENCOUNTER
Please schedule patient for follow up appointment for bipolar disorder and to review disability paperwork.  Schedule 30 minutes pls.

## 2025-06-25 NOTE — TELEPHONE ENCOUNTER
Called Patient's wife Loi to schedule appointment for Patient. Left VM asking her to call the office to schedule an appointment for Patient.

## 2025-06-27 ENCOUNTER — OFFICE VISIT (OUTPATIENT)
Dept: PRIMARY CARE | Facility: CLINIC | Age: 50
End: 2025-06-27
Payer: COMMERCIAL

## 2025-06-27 VITALS
BODY MASS INDEX: 26.22 KG/M2 | DIASTOLIC BLOOD PRESSURE: 60 MMHG | OXYGEN SATURATION: 98 % | SYSTOLIC BLOOD PRESSURE: 122 MMHG | WEIGHT: 177 LBS | HEART RATE: 66 BPM | HEIGHT: 69 IN

## 2025-06-27 DIAGNOSIS — F31.70 BIPOLAR AFFECTIVE DISORDER IN REMISSION (MULTI): Primary | ICD-10-CM

## 2025-06-27 DIAGNOSIS — I10 PRIMARY HYPERTENSION: ICD-10-CM

## 2025-06-27 DIAGNOSIS — F33.1 MODERATE EPISODE OF RECURRENT MAJOR DEPRESSIVE DISORDER: ICD-10-CM

## 2025-06-27 PROCEDURE — 3008F BODY MASS INDEX DOCD: CPT | Performed by: STUDENT IN AN ORGANIZED HEALTH CARE EDUCATION/TRAINING PROGRAM

## 2025-06-27 PROCEDURE — 99214 OFFICE O/P EST MOD 30 MIN: CPT | Performed by: STUDENT IN AN ORGANIZED HEALTH CARE EDUCATION/TRAINING PROGRAM

## 2025-06-27 PROCEDURE — 3078F DIAST BP <80 MM HG: CPT | Performed by: STUDENT IN AN ORGANIZED HEALTH CARE EDUCATION/TRAINING PROGRAM

## 2025-06-27 PROCEDURE — 3074F SYST BP LT 130 MM HG: CPT | Performed by: STUDENT IN AN ORGANIZED HEALTH CARE EDUCATION/TRAINING PROGRAM

## 2025-06-27 PROCEDURE — RXMED WILLOW AMBULATORY MEDICATION CHARGE

## 2025-06-27 RX ORDER — ARIPIPRAZOLE 20 MG/1
20 TABLET ORAL NIGHTLY
Qty: 30 TABLET | Refills: 2 | Status: SHIPPED | OUTPATIENT
Start: 2025-06-27 | End: 2025-07-01 | Stop reason: WASHOUT

## 2025-06-27 RX ORDER — AMLODIPINE BESYLATE 5 MG/1
5 TABLET ORAL DAILY
Qty: 90 TABLET | Refills: 1 | Status: SHIPPED | OUTPATIENT
Start: 2025-06-27

## 2025-06-27 RX ORDER — VENLAFAXINE HYDROCHLORIDE 37.5 MG/1
37.5 CAPSULE, EXTENDED RELEASE ORAL DAILY
Qty: 7 CAPSULE | Refills: 0 | Status: SHIPPED | OUTPATIENT
Start: 2025-06-27

## 2025-06-27 ASSESSMENT — PATIENT HEALTH QUESTIONNAIRE - PHQ9
5. POOR APPETITE OR OVEREATING: NEARLY EVERY DAY
6. FEELING BAD ABOUT YOURSELF - OR THAT YOU ARE A FAILURE OR HAVE LET YOURSELF OR YOUR FAMILY DOWN: NEARLY EVERY DAY
7. TROUBLE CONCENTRATING ON THINGS, SUCH AS READING THE NEWSPAPER OR WATCHING TELEVISION: NEARLY EVERY DAY
3. TROUBLE FALLING OR STAYING ASLEEP OR SLEEPING TOO MUCH: NEARLY EVERY DAY
8. MOVING OR SPEAKING SO SLOWLY THAT OTHER PEOPLE COULD HAVE NOTICED. OR THE OPPOSITE, BEING SO FIGETY OR RESTLESS THAT YOU HAVE BEEN MOVING AROUND A LOT MORE THAN USUAL: NEARLY EVERY DAY
SUM OF ALL RESPONSES TO PHQ9 QUESTIONS 1 AND 2: 6
9. THOUGHTS THAT YOU WOULD BE BETTER OFF DEAD, OR OF HURTING YOURSELF: NOT AT ALL
SUM OF ALL RESPONSES TO PHQ QUESTIONS 1-9: 24
2. FEELING DOWN, DEPRESSED OR HOPELESS: NEARLY EVERY DAY
1. LITTLE INTEREST OR PLEASURE IN DOING THINGS: NEARLY EVERY DAY
4. FEELING TIRED OR HAVING LITTLE ENERGY: NEARLY EVERY DAY

## 2025-06-27 ASSESSMENT — PAIN SCALES - GENERAL: PAINLEVEL_OUTOF10: 0-NO PAIN

## 2025-06-27 NOTE — PATIENT INSTRUCTIONS
STOP trazodone if you are sleeping too much  DECREASE effexor to 37.5 mg daily for 1 week, then STOP effexor.   INCREASE Abilify to 20 mg daily.

## 2025-06-27 NOTE — PROGRESS NOTES
"Subjective   Patient ID: Roman Ellis is a 50 y.o. male who presents for Follow-up.    Here today to follow up regarding bipolar disorder.  Patient reporting depression and fatigue.  States psychiatry appointments have been repeatedly canceled by provider.  States that he is thinking he will stop taking Abilify to see if he does better.         Review of Systems   Constitutional:  Positive for fatigue.   Psychiatric/Behavioral:  Positive for dysphoric mood and sleep disturbance. Negative for self-injury. The patient is nervous/anxious.        Objective     Visit Vitals  /60   Pulse 66   Ht 1.753 m (5' 9\")   Wt 80.3 kg (177 lb)   SpO2 98%   BMI 26.14 kg/m²   Smoking Status Every Day   BSA 1.98 m²     BP Readings from Last 5 Encounters:   06/27/25 122/60   06/04/25 118/66   06/03/25 120/80   05/20/25 139/78   05/17/25 158/90         Physical Exam  Constitutional:       Appearance: Normal appearance.   HENT:      Head: Normocephalic and atraumatic.   Eyes:      Conjunctiva/sclera: Conjunctivae normal.   Cardiovascular:      Rate and Rhythm: Normal rate and regular rhythm.   Pulmonary:      Effort: Pulmonary effort is normal.      Breath sounds: Normal breath sounds.   Skin:     General: Skin is warm and dry.   Neurological:      Mental Status: He is alert and oriented to person, place, and time.   Psychiatric:         Attention and Perception: Attention normal.         Mood and Affect: Mood is depressed. Affect is flat.         Speech: Speech normal.         Behavior: Behavior normal. Behavior is cooperative.         Thought Content: Thought content normal.      Comments: Patient with somewhat decreased judgement and insight compared to prior visit.  Is able to be redirected and engaged with logically.          Assessment & Plan  Moderate episode of recurrent major depressive disorder    Orders:    venlafaxine XR (Effexor-XR) 37.5 mg 24 hr capsule; Take 1 capsule (37.5 mg) by mouth once daily.    Bipolar " affective disorder in remission (Multi)    Orders:    venlafaxine XR (Effexor-XR) 37.5 mg 24 hr capsule; Take 1 capsule (37.5 mg) by mouth once daily.    ARIPiprazole (Abilify) 20 mg tablet; Take 1 tablet (20 mg) by mouth once daily at bedtime.    Follow Up In Primary Care - Established; Future  patient advised to NOT discontinue Abilify.    If he is feeling too tired, should stop taking trazodone at bedtime.    To reduce overall drug burden, will taper venlafaxine to 37.5 mg, then DC after a week.    Advised to INCREASE abilify to 20 mg at bedtime.  Can switch to BID if needed.  If patient has undesirable side effects, could be changed to alternative agent.  Probably would avoid quetiapine given complaints of sedation.    Primary hypertension    Orders:    amLODIPine (Norvasc) 5 mg tablet; Take 1 tablet (5 mg) by mouth once daily.  BP control is tight enough to allow tapering of amlodipine.  If BP remains good at follow up, will plan to DC.      Follow up for bipolar scheduled in 2 weeks.  Patient okay to cancel if he gets in with psychiatry before then.   Reviewed and approved by RAYMOND DU on 6/29/25 at 6:00 PM.

## 2025-06-27 NOTE — ASSESSMENT & PLAN NOTE
Orders:    venlafaxine XR (Effexor-XR) 37.5 mg 24 hr capsule; Take 1 capsule (37.5 mg) by mouth once daily.    ARIPiprazole (Abilify) 20 mg tablet; Take 1 tablet (20 mg) by mouth once daily at bedtime.    Follow Up In Primary Care - Established; Future  patient advised to NOT discontinue Abilify.    If he is feeling too tired, should stop taking trazodone at bedtime.    To reduce overall drug burden, will taper venlafaxine to 37.5 mg, then DC after a week.    Advised to INCREASE abilify to 20 mg at bedtime.  Can switch to BID if needed.  If patient has undesirable side effects, could be changed to alternative agent.  Probably would avoid quetiapine given complaints of sedation.

## 2025-06-27 NOTE — ASSESSMENT & PLAN NOTE
Orders:    amLODIPine (Norvasc) 5 mg tablet; Take 1 tablet (5 mg) by mouth once daily.  BP control is tight enough to allow tapering of amlodipine.  If BP remains good at follow up, will plan to DC.

## 2025-06-29 ASSESSMENT — ENCOUNTER SYMPTOMS
FATIGUE: 1
SLEEP DISTURBANCE: 1
DYSPHORIC MOOD: 1
NERVOUS/ANXIOUS: 1

## 2025-07-01 ENCOUNTER — PHARMACY VISIT (OUTPATIENT)
Dept: PHARMACY | Facility: CLINIC | Age: 50
End: 2025-07-01
Payer: COMMERCIAL

## 2025-07-01 DIAGNOSIS — F31.9 BIPOLAR 1 DISORDER (MULTI): ICD-10-CM

## 2025-07-01 RX ORDER — LURASIDONE HYDROCHLORIDE 40 MG/1
40 TABLET, FILM COATED ORAL
Qty: 30 TABLET | Refills: 1 | Status: SHIPPED | OUTPATIENT
Start: 2025-07-01 | End: 2025-08-30

## 2025-07-11 ENCOUNTER — APPOINTMENT (OUTPATIENT)
Dept: PRIMARY CARE | Facility: CLINIC | Age: 50
End: 2025-07-11
Payer: COMMERCIAL

## 2025-07-14 ENCOUNTER — TELEPHONE (OUTPATIENT)
Dept: PRIMARY CARE | Facility: CLINIC | Age: 50
End: 2025-07-14
Payer: COMMERCIAL

## 2025-07-14 DIAGNOSIS — F41.9 ANXIETY: ICD-10-CM

## 2025-07-14 RX ORDER — GABAPENTIN 300 MG/1
300 CAPSULE ORAL 3 TIMES DAILY
Qty: 90 CAPSULE | Refills: 0 | Status: SHIPPED | OUTPATIENT
Start: 2025-07-14 | End: 2025-08-13

## 2025-07-14 NOTE — TELEPHONE ENCOUNTER
Patient requesting a refill on Gabapentin 300 mg up to 3x a day as needed going to [facility] in [place]. Patient is in between psychiatrists and was wondering if [facility] could fill until next appt with Dr. Thornton on 08/22/25. Please advise.    Last OV: 06/27/2025  Next OV: 07/28/2025    Contact: [phone]

## 2025-07-18 ENCOUNTER — TELEPHONE (OUTPATIENT)
Dept: BEHAVIORAL HEALTH | Facility: CLINIC | Age: 50
End: 2025-07-18

## 2025-07-18 DIAGNOSIS — F41.9 ANXIETY: ICD-10-CM

## 2025-07-18 RX ORDER — PROPRANOLOL HYDROCHLORIDE 10 MG/1
20 TABLET ORAL 2 TIMES DAILY
Qty: 120 TABLET | Refills: 0 | Status: SHIPPED | OUTPATIENT
Start: 2025-07-18 | End: 2025-08-07 | Stop reason: HOSPADM

## 2025-07-23 DIAGNOSIS — F31.9 BIPOLAR 1 DISORDER (MULTI): ICD-10-CM

## 2025-07-26 ENCOUNTER — APPOINTMENT (OUTPATIENT)
Dept: CARDIOLOGY | Facility: HOSPITAL | Age: 50
End: 2025-07-26
Payer: COMMERCIAL

## 2025-07-26 ENCOUNTER — HOSPITAL ENCOUNTER (EMERGENCY)
Facility: HOSPITAL | Age: 50
Discharge: PSYCHIATRIC OR SUBSTANCE USE FACILITY | End: 2025-07-27
Attending: STUDENT IN AN ORGANIZED HEALTH CARE EDUCATION/TRAINING PROGRAM
Payer: COMMERCIAL

## 2025-07-26 ENCOUNTER — HOSPITAL ENCOUNTER (INPATIENT)
Age: 50
End: 2025-07-26
Payer: COMMERCIAL

## 2025-07-26 DIAGNOSIS — Z00.8 ENCOUNTER FOR PSYCHOLOGICAL EVALUATION: Primary | ICD-10-CM

## 2025-07-26 LAB
ALBUMIN SERPL BCP-MCNC: 4.5 G/DL (ref 3.4–5)
ALP SERPL-CCNC: 39 U/L (ref 33–120)
ALT SERPL W P-5'-P-CCNC: 17 U/L (ref 10–52)
AMPHETAMINES UR QL SCN: NORMAL
ANION GAP SERPL CALC-SCNC: 12 MMOL/L (ref 10–20)
APAP SERPL-MCNC: <10 UG/ML (ref ?–30)
AST SERPL W P-5'-P-CCNC: 12 U/L (ref 9–39)
BARBITURATES UR QL SCN: NORMAL
BASOPHILS # BLD AUTO: 0.06 X10*3/UL (ref 0–0.1)
BASOPHILS NFR BLD AUTO: 0.5 %
BENZODIAZ UR QL SCN: NORMAL
BILIRUB SERPL-MCNC: 0.6 MG/DL (ref 0–1.2)
BUN SERPL-MCNC: 15 MG/DL (ref 6–23)
BZE UR QL SCN: NORMAL
CALCIUM SERPL-MCNC: 9.6 MG/DL (ref 8.6–10.3)
CANNABINOIDS UR QL SCN: NORMAL
CHLORIDE SERPL-SCNC: 101 MMOL/L (ref 98–107)
CO2 SERPL-SCNC: 27 MMOL/L (ref 21–32)
CREAT SERPL-MCNC: 1.06 MG/DL (ref 0.5–1.3)
EGFRCR SERPLBLD CKD-EPI 2021: 85 ML/MIN/1.73M*2
EOSINOPHIL # BLD AUTO: 0.21 X10*3/UL (ref 0–0.7)
EOSINOPHIL NFR BLD AUTO: 1.9 %
ERYTHROCYTE [DISTWIDTH] IN BLOOD BY AUTOMATED COUNT: 12.4 % (ref 11.5–14.5)
ETHANOL SERPL-MCNC: <10 MG/DL
FENTANYL+NORFENTANYL UR QL SCN: NORMAL
GLUCOSE SERPL-MCNC: 109 MG/DL (ref 74–99)
HCT VFR BLD AUTO: 46.9 % (ref 41–52)
HGB BLD-MCNC: 16 G/DL (ref 13.5–17.5)
IMM GRANULOCYTES # BLD AUTO: 0.03 X10*3/UL (ref 0–0.7)
IMM GRANULOCYTES NFR BLD AUTO: 0.3 % (ref 0–0.9)
LYMPHOCYTES # BLD AUTO: 1.56 X10*3/UL (ref 1.2–4.8)
LYMPHOCYTES NFR BLD AUTO: 14.2 %
MCH RBC QN AUTO: 30.3 PG (ref 26–34)
MCHC RBC AUTO-ENTMCNC: 34.1 G/DL (ref 32–36)
MCV RBC AUTO: 89 FL (ref 80–100)
METHADONE UR QL SCN: NORMAL
MONOCYTES # BLD AUTO: 0.81 X10*3/UL (ref 0.1–1)
MONOCYTES NFR BLD AUTO: 7.4 %
NEUTROPHILS # BLD AUTO: 8.34 X10*3/UL (ref 1.2–7.7)
NEUTROPHILS NFR BLD AUTO: 75.7 %
NRBC BLD-RTO: 0 /100 WBCS (ref 0–0)
OPIATES UR QL SCN: NORMAL
OXYCODONE+OXYMORPHONE UR QL SCN: NORMAL
PCP UR QL SCN: NORMAL
PLATELET # BLD AUTO: 279 X10*3/UL (ref 150–450)
POTASSIUM SERPL-SCNC: 4.2 MMOL/L (ref 3.5–5.3)
PROT SERPL-MCNC: 7.4 G/DL (ref 6.4–8.2)
RBC # BLD AUTO: 5.28 X10*6/UL (ref 4.5–5.9)
SALICYLATES SERPL-MCNC: <3 MG/DL (ref ?–20)
SODIUM SERPL-SCNC: 136 MMOL/L (ref 136–145)
WBC # BLD AUTO: 11 X10*3/UL (ref 4.4–11.3)

## 2025-07-26 PROCEDURE — 36415 COLL VENOUS BLD VENIPUNCTURE: CPT | Performed by: STUDENT IN AN ORGANIZED HEALTH CARE EDUCATION/TRAINING PROGRAM

## 2025-07-26 PROCEDURE — 80307 DRUG TEST PRSMV CHEM ANLYZR: CPT | Performed by: STUDENT IN AN ORGANIZED HEALTH CARE EDUCATION/TRAINING PROGRAM

## 2025-07-26 PROCEDURE — 84075 ASSAY ALKALINE PHOSPHATASE: CPT | Performed by: STUDENT IN AN ORGANIZED HEALTH CARE EDUCATION/TRAINING PROGRAM

## 2025-07-26 PROCEDURE — 93005 ELECTROCARDIOGRAM TRACING: CPT

## 2025-07-26 PROCEDURE — 85025 COMPLETE CBC W/AUTO DIFF WBC: CPT | Performed by: STUDENT IN AN ORGANIZED HEALTH CARE EDUCATION/TRAINING PROGRAM

## 2025-07-26 PROCEDURE — 99285 EMERGENCY DEPT VISIT HI MDM: CPT | Performed by: STUDENT IN AN ORGANIZED HEALTH CARE EDUCATION/TRAINING PROGRAM

## 2025-07-26 PROCEDURE — 80179 DRUG ASSAY SALICYLATE: CPT | Performed by: STUDENT IN AN ORGANIZED HEALTH CARE EDUCATION/TRAINING PROGRAM

## 2025-07-26 SDOH — HEALTH STABILITY: MENTAL HEALTH: WISH TO BE DEAD (PAST 1 MONTH): YES

## 2025-07-26 SDOH — HEALTH STABILITY: MENTAL HEALTH: FOR HIGH RISK PATIENTS: ALL INTERVENTIONS ABOVE, PLUS:;1:1 PATIENT OBSERVER AT ALL TIMES

## 2025-07-26 SDOH — HEALTH STABILITY: MENTAL HEALTH: SUICIDAL BEHAVIOR (LIFETIME): NO

## 2025-07-26 SDOH — ECONOMIC STABILITY: HOUSING INSECURITY: FEELS SAFE LIVING IN HOME: YES

## 2025-07-26 SDOH — HEALTH STABILITY: MENTAL HEALTH: NON-SPECIFIC ACTIVE SUICIDAL THOUGHTS (PAST 1 MONTH): YES

## 2025-07-26 SDOH — HEALTH STABILITY: MENTAL HEALTH: HAVE YOU WISHED YOU WERE DEAD OR WISHED YOU COULD GO TO SLEEP AND NOT WAKE UP?: YES

## 2025-07-26 SDOH — HEALTH STABILITY: MENTAL HEALTH: SUICIDE ASSESSMENT: ADULT (C-SSRS)

## 2025-07-26 SDOH — HEALTH STABILITY: MENTAL HEALTH: ANXIETY SYMPTOMS: GENERALIZED

## 2025-07-26 SDOH — HEALTH STABILITY: MENTAL HEALTH

## 2025-07-26 SDOH — HEALTH STABILITY: MENTAL HEALTH: ACTIVE SUICIDAL IDEATION WITH SOME INTENT TO ACT, WITHOUT SPECIFIC PLAN (PAST 1 MONTH): NO

## 2025-07-26 SDOH — HEALTH STABILITY: MENTAL HEALTH: ACTIVE SUICIDAL IDEATION WITH SPECIFIC PLAN AND INTENT (PAST 1 MONTH): NO

## 2025-07-26 SDOH — HEALTH STABILITY: MENTAL HEALTH: IN THE PAST FEW WEEKS, HAVE YOU FELT THAT YOU OR YOUR FAMILY WOULD BE BETTER OFF IF YOU WERE DEAD?: NO

## 2025-07-26 SDOH — HEALTH STABILITY: MENTAL HEALTH
DEPRESSION SYMPTOMS: APPETITE CHANGE;CHANGE IN ENERGY LEVEL;CRYING;DECREASED LIBIDO;FEELINGS OF HELPLESSNESS;FEELINGS OF HOPELESSESS;FEELINGS OF WORTHLESSNESS;IMPAIRED CONCENTRATION;INCREASED IRRITABILITY;ISOLATIVE;LOSS OF INTEREST;SLEEP DISTURBANCE

## 2025-07-26 SDOH — HEALTH STABILITY: MENTAL HEALTH: CONTENT: UNREMARKABLE

## 2025-07-26 SDOH — SOCIAL STABILITY: SOCIAL INSECURITY: FAMILY BEHAVIORS: APPROPRIATE FOR SITUATION;CALM

## 2025-07-26 SDOH — SOCIAL STABILITY: SOCIAL NETWORK: PARENT/GUARDIAN/SIGNIFICANT OTHER INVOLVEMENT: ATTENTIVE TO PATIENT NEEDS

## 2025-07-26 SDOH — ECONOMIC STABILITY: GENERAL

## 2025-07-26 SDOH — HEALTH STABILITY: MENTAL HEALTH: HAVE YOU EVER TRIED TO KILL YOURSELF?: NO

## 2025-07-26 SDOH — HEALTH STABILITY: MENTAL HEALTH: BEHAVIORAL HEALTH(WDL): EXCEPTIONS TO WDL

## 2025-07-26 SDOH — HEALTH STABILITY: MENTAL HEALTH: ARE YOU HAVING THOUGHTS OF KILLING YOURSELF RIGHT NOW?: NO

## 2025-07-26 SDOH — HEALTH STABILITY: MENTAL HEALTH: IN THE PAST WEEK, HAVE YOU BEEN HAVING THOUGHTS ABOUT KILLING YOURSELF?: YES

## 2025-07-26 SDOH — HEALTH STABILITY: MENTAL HEALTH: HAVE YOU ACTUALLY HAD ANY THOUGHTS OF KILLING YOURSELF?: NO

## 2025-07-26 SDOH — HEALTH STABILITY: MENTAL HEALTH: IN THE PAST FEW WEEKS, HAVE YOU WISHED YOU WERE DEAD?: YES

## 2025-07-26 SDOH — HEALTH STABILITY: MENTAL HEALTH: BEHAVIORS/MOOD: SAD;VERBAL;FLAT AFFECT

## 2025-07-26 ASSESSMENT — PAIN SCALES - GENERAL
PAINLEVEL_OUTOF10: 0 - NO PAIN
PAINLEVEL_OUTOF10: 0 - NO PAIN

## 2025-07-26 ASSESSMENT — PAIN - FUNCTIONAL ASSESSMENT: PAIN_FUNCTIONAL_ASSESSMENT: 0-10

## 2025-07-26 ASSESSMENT — LIFESTYLE VARIABLES
EVER FELT BAD OR GUILTY ABOUT YOUR DRINKING: NO
EVER HAD A DRINK FIRST THING IN THE MORNING TO STEADY YOUR NERVES TO GET RID OF A HANGOVER: NO
SUBSTANCE_ABUSE_PAST_12_MONTHS: YES
HAVE YOU EVER FELT YOU SHOULD CUT DOWN ON YOUR DRINKING: NO
PRESCIPTION_ABUSE_PAST_12_MONTHS: NO
HAVE PEOPLE ANNOYED YOU BY CRITICIZING YOUR DRINKING: NO
TOTAL SCORE: 0

## 2025-07-26 NOTE — ED PROVIDER NOTES
"Chief Complaint: Depression  HPI: This is a 50-year-old male, presenting to the emergency department for depression, anxiety, and states he can no longer live this way.  Patient was recently diagnosed with bipolar disorder, and has been working through medication changes to help with his symptoms.  Wife at bedside states that the patient is no longer manic as he initially presented, however is now the opposite, and is depressed, no longer able to care for himself at home because of his depression.  Patient denies any specific SI or HI, however wife states that yesterday, he went looking for his guns in the gun safe to \"clean them\".  Patient states that sometimes he gets intrusive thoughts, in this case that his guns were mireya, and he needed to check on them.  He denies any thoughts of harming himself and states that this was not his intention with looking at the guns.  Patient does have therapy and had a therapy appointment this week, and he does have a psychiatrist who prescribes him medications, however states he is unable to see this provider for another month, and again can no longer live this way.    Medical History[1]   Surgical History[2]    Physical Exam  Vitals and nursing note reviewed.   Constitutional:       Appearance: Normal appearance.   HENT:      Head: Normocephalic and atraumatic.      Mouth/Throat:      Mouth: Mucous membranes are moist.     Eyes:      Extraocular Movements: Extraocular movements intact.     Pulmonary:      Effort: Pulmonary effort is normal.   Abdominal:      General: Abdomen is flat.      Palpations: Abdomen is soft.     Skin:     General: Skin is warm.     Neurological:      General: No focal deficit present.      Mental Status: He is alert.     Psychiatric:         Attention and Perception: Attention normal.         Mood and Affect: Mood is depressed. Affect is flat.         Speech: Speech normal.         Behavior: Behavior is withdrawn.         Thought Content: Thought " "content normal.         Cognition and Memory: Cognition normal.         Judgment: Judgment normal.            ED Course/MDM       This is a 50 y.o. male presenting to the ED for evaluation of depression, and feeling withdrawn.  Patient states that he is currently being treated for bipolar disorder which was newly diagnosed recently.  He states that the medications are making him feel depressed, and he is unable to take care of himself at home because of depression.  Wife also mentions that he went \"looking for his guns to clean them\".  When patient was act about this, he states that he gets intrusive thoughts, in this case that his guns were mireya, and he needed to check on them.  He denies any SI or HI.  On physical exam, the patient does appear withdrawn and depressed with a flat affect, however is nontoxic-appearing, no acute distress.  Lab work was obtained, and is overall grossly unremarkable, the patient is medically cleared for an inpatient psychiatric admission.  [facility] evaluated the patient, and does feel that the patient warrants inpatient psychiatric admission.  At time of signout to the oncoming ED team, the patient is pending placement.  He was signed out in stable condition.    Final Impression  1.  Anxiety and depression  Disposition/Plan: Signout  Condition at disposition: Stable.     Perlita Luque DO  Emergency Medicine Physician       [1]   Past Medical History:  Diagnosis Date    Fever 02/12/2025    History of laparoscopic appendectomy 02/12/2025   [2] History reviewed. No pertinent surgical history.       Perlita Luque DO  07/26/25 1742    "

## 2025-07-26 NOTE — PROGRESS NOTES
For full history, physical exam, and prior hospital course, please see previous ED provider note. This is in addition to the primary record.    Chief Complaint   Patient presents with    Psychiatric Evaluation       Comments/Additional Findings: Patient was signed out to me by Dr Mercado at change of shift.   Pending items at this time include placement.              ED Course as of 07/26/25 2248   Sat Jul 26, 2025 2244 Patient was accepted to Dr Julio in [place] [[place]]      ED Course User Index  [[facility]] Alex Sewell DO         Diagnoses as of 07/26/25 2248   Encounter for psychological evaluation       No orders to display     Labs Reviewed   CBC WITH AUTO DIFFERENTIAL - Abnormal       Result Value    WBC 11.0      nRBC 0.0      RBC 5.28      Hemoglobin 16.0      Hematocrit 46.9      MCV 89      MCH 30.3      MCHC 34.1      RDW 12.4      Platelets 279      Neutrophils % 75.7      Immature Granulocytes %, Automated 0.3      Lymphocytes % 14.2      Monocytes % 7.4      Eosinophils % 1.9      Basophils % 0.5      Neutrophils Absolute 8.34 (*)     Immature Granulocytes Absolute, Automated 0.03      Lymphocytes Absolute 1.56      Monocytes Absolute 0.81      Eosinophils Absolute 0.21      Basophils Absolute 0.06     COMPREHENSIVE METABOLIC PANEL - Abnormal    Glucose 109 (*)     Sodium 136      Potassium 4.2      Chloride 101      Bicarbonate 27      Anion Gap 12      Urea Nitrogen 15      Creatinine 1.06      eGFR 85      Calcium 9.6      Albumin 4.5      Alkaline Phosphatase 39      Total Protein 7.4      AST 12      Bilirubin, Total 0.6      ALT 17     DRUG SCREEN,URINE - Normal    Amphetamine Screen, Urine Presumptive Negative      Barbiturate Screen, Urine Presumptive Negative      Benzodiazepines Screen, Urine Presumptive Negative      Cannabinoid Screen, Urine Presumptive Negative      Cocaine Metabolite Screen, Urine Presumptive Negative      Fentanyl Screen, Urine Presumptive Negative      Opiate Screen, Urine  Presumptive Negative      Oxycodone Screen, Urine Presumptive Negative      PCP Screen, Urine Presumptive Negative      Methadone Screen, Urine Presumptive Negative      Narrative:     Drug screen results are presumptive and should not be used to assess   compliance with prescribed medication. Contact the performing [facility] laboratory   to add-on definitive confirmatory testing if clinically indicated.    Toxicology screening results are reported qualitatively. The concentration must   be greater than or equal to the cutoff to be reported as positive. The concentration   at which the screening test can detect an individual drug or metabolite varies.   The absence of expected drug(s) and/or drug metabolite(s) may indicate non-compliance,   inappropriate timing of specimen collection relative to drug administration, poor drug   absorption, diluted/adulterated urine, or limitations of testing. For medical purposes   only; not valid for forensic use.    Interpretive questions should be directed to the laboratory medical directors.   ACUTE TOXICOLOGY PANEL, BLOOD - Normal    Acetaminophen <10.0      Salicylate  <3      Alcohol <10             Procedure  Procedures             Note: This note was dictated by speech recognition. Minor errors in transcription may be present.

## 2025-07-26 NOTE — ED TRIAGE NOTES
"Pt presented to the ED with c/o psych eval. Pt was recent;y admitted and DC from psych and put on medication for bipolar. Pt denies SI/HI at this time but wife states he has been depressed. He was wanting not to be awake but not sleeping 24/7 and looking for the guns to \"clean\" them.  Pt denies everything at this time. He started new medications on 7/1 and feels like the have not been helping.   "

## 2025-07-26 NOTE — ED NOTES
"Pt states that he either sleeps too much or not enough.  He denies any SI at this time but he states he is \"getting there\".  He was taking Latuda and it was causing him to not sleep and now the propranolol is causing him to sleep too much     Darrick Huffman RN  07/26/25 2037    "

## 2025-07-27 ENCOUNTER — HOSPITAL ENCOUNTER (INPATIENT)
Facility: HOSPITAL | Age: 50
End: 2025-07-27
Attending: STUDENT IN AN ORGANIZED HEALTH CARE EDUCATION/TRAINING PROGRAM | Admitting: STUDENT IN AN ORGANIZED HEALTH CARE EDUCATION/TRAINING PROGRAM
Payer: COMMERCIAL

## 2025-07-27 VITALS
TEMPERATURE: 97 F | BODY MASS INDEX: 25.92 KG/M2 | RESPIRATION RATE: 16 BRPM | OXYGEN SATURATION: 95 % | SYSTOLIC BLOOD PRESSURE: 121 MMHG | HEIGHT: 69 IN | DIASTOLIC BLOOD PRESSURE: 71 MMHG | WEIGHT: 175 LBS | HEART RATE: 62 BPM

## 2025-07-27 DIAGNOSIS — I10 PRIMARY HYPERTENSION: ICD-10-CM

## 2025-07-27 DIAGNOSIS — F10.10 ALCOHOL ABUSE: ICD-10-CM

## 2025-07-27 DIAGNOSIS — N40.0 BENIGN PROSTATIC HYPERPLASIA WITHOUT LOWER URINARY TRACT SYMPTOMS: ICD-10-CM

## 2025-07-27 DIAGNOSIS — F31.4: ICD-10-CM

## 2025-07-27 DIAGNOSIS — F17.200 TOBACCO USE DISORDER: ICD-10-CM

## 2025-07-27 PROCEDURE — 1140000001 HC PRIVATE PSYCH ROOM DAILY

## 2025-07-27 PROCEDURE — 2500000001 HC RX 250 WO HCPCS SELF ADMINISTERED DRUGS (ALT 637 FOR MEDICARE OP): Performed by: EMERGENCY MEDICINE

## 2025-07-27 RX ORDER — HYDROXYZINE HYDROCHLORIDE 25 MG/1
25 TABLET, FILM COATED ORAL EVERY 6 HOURS PRN
Status: DISCONTINUED | OUTPATIENT
Start: 2025-07-27 | End: 2025-08-07 | Stop reason: HOSPADM

## 2025-07-27 RX ORDER — LURASIDONE HYDROCHLORIDE 40 MG/1
40 TABLET, FILM COATED ORAL
Qty: 90 TABLET | Refills: 1 | OUTPATIENT
Start: 2025-07-27 | End: 2025-09-25

## 2025-07-27 RX ORDER — DIPHENHYDRAMINE HCL 50 MG
50 CAPSULE ORAL EVERY 6 HOURS PRN
Status: DISCONTINUED | OUTPATIENT
Start: 2025-07-27 | End: 2025-08-07 | Stop reason: HOSPADM

## 2025-07-27 RX ORDER — DIPHENHYDRAMINE HYDROCHLORIDE 50 MG/ML
50 INJECTION, SOLUTION INTRAMUSCULAR; INTRAVENOUS ONCE AS NEEDED
Status: DISCONTINUED | OUTPATIENT
Start: 2025-07-27 | End: 2025-08-07 | Stop reason: HOSPADM

## 2025-07-27 RX ORDER — ACETAMINOPHEN 325 MG/1
650 TABLET ORAL EVERY 4 HOURS PRN
Status: DISCONTINUED | OUTPATIENT
Start: 2025-07-27 | End: 2025-08-07 | Stop reason: HOSPADM

## 2025-07-27 RX ORDER — HYDROXYZINE HYDROCHLORIDE 25 MG/1
50 TABLET, FILM COATED ORAL ONCE
Status: COMPLETED | OUTPATIENT
Start: 2025-07-27 | End: 2025-07-27

## 2025-07-27 RX ORDER — TRAZODONE HYDROCHLORIDE 50 MG/1
50 TABLET ORAL NIGHTLY PRN
Status: DISCONTINUED | OUTPATIENT
Start: 2025-07-27 | End: 2025-08-07 | Stop reason: HOSPADM

## 2025-07-27 RX ADMIN — HYDROXYZINE HYDROCHLORIDE 50 MG: 25 TABLET, FILM COATED ORAL at 09:39

## 2025-07-27 SDOH — HEALTH STABILITY: MENTAL HEALTH
DO YOU FEEL STRESS - TENSE, RESTLESS, NERVOUS, OR ANXIOUS, OR UNABLE TO SLEEP AT NIGHT BECAUSE YOUR MIND IS TROUBLED ALL THE TIME - THESE DAYS?: ONLY A LITTLE

## 2025-07-27 SDOH — SOCIAL STABILITY: SOCIAL NETWORK: HOW OFTEN DO YOU GET TOGETHER WITH FRIENDS OR RELATIVES?: ONCE A WEEK

## 2025-07-27 SDOH — ECONOMIC STABILITY: HOUSING INSECURITY: IN THE LAST 12 MONTHS, WAS THERE A TIME WHEN YOU WERE NOT ABLE TO PAY THE MORTGAGE OR RENT ON TIME?: NO

## 2025-07-27 SDOH — HEALTH STABILITY: MENTAL HEALTH: HOW OFTEN DO YOU HAVE SIX OR MORE DRINKS ON ONE OCCASION?: MONTHLY

## 2025-07-27 SDOH — SOCIAL STABILITY: SOCIAL NETWORK: HOW OFTEN DO YOU ATTEND MEETINGS OF THE CLUBS OR ORGANIZATIONS YOU BELONG TO?: NEVER

## 2025-07-27 SDOH — ECONOMIC STABILITY: INCOME INSECURITY: IN THE PAST 12 MONTHS HAS THE ELECTRIC, GAS, OIL, OR WATER COMPANY THREATENED TO SHUT OFF SERVICES IN YOUR HOME?: NO

## 2025-07-27 SDOH — SOCIAL STABILITY: SOCIAL NETWORK: PARENT/GUARDIAN/SIGNIFICANT OTHER INVOLVEMENT: ATTENTIVE TO PATIENT NEEDS

## 2025-07-27 SDOH — HEALTH STABILITY: MENTAL HEALTH: HOW OFTEN DO YOU HAVE A DRINK CONTAINING ALCOHOL?: 4 OR MORE TIMES A WEEK

## 2025-07-27 SDOH — ECONOMIC STABILITY: FOOD INSECURITY: WITHIN THE PAST 12 MONTHS, THE FOOD YOU BOUGHT JUST DIDN'T LAST AND YOU DIDN'T HAVE MONEY TO GET MORE.: NEVER TRUE

## 2025-07-27 SDOH — HEALTH STABILITY: PHYSICAL HEALTH: ON AVERAGE, HOW MANY MINUTES DO YOU ENGAGE IN EXERCISE AT THIS LEVEL?: 30 MIN

## 2025-07-27 SDOH — SOCIAL STABILITY: SOCIAL INSECURITY: ARE YOU MARRIED, WIDOWED, DIVORCED, SEPARATED, NEVER MARRIED, OR LIVING WITH A PARTNER?: PATIENT DECLINED

## 2025-07-27 SDOH — HEALTH STABILITY: MENTAL HEALTH: BEHAVIORS/MOOD: ANXIOUS;RESTLESS;COOPERATIVE

## 2025-07-27 SDOH — ECONOMIC STABILITY: FOOD INSECURITY: HOW HARD IS IT FOR YOU TO PAY FOR THE VERY BASICS LIKE FOOD, HOUSING, MEDICAL CARE, AND HEATING?: NOT VERY HARD

## 2025-07-27 SDOH — SOCIAL STABILITY: SOCIAL NETWORK: HOW OFTEN DO YOU ATTEND CHURCH OR RELIGIOUS SERVICES?: MORE THAN 4 TIMES PER YEAR

## 2025-07-27 SDOH — SOCIAL STABILITY: SOCIAL INSECURITY: WITHIN THE LAST YEAR, HAVE YOU BEEN AFRAID OF YOUR PARTNER OR EX-PARTNER?: NO

## 2025-07-27 SDOH — HEALTH STABILITY: MENTAL HEALTH: HOW MANY DRINKS CONTAINING ALCOHOL DO YOU HAVE ON A TYPICAL DAY WHEN YOU ARE DRINKING?: 1 OR 2

## 2025-07-27 SDOH — ECONOMIC STABILITY: HOUSING INSECURITY: AT ANY TIME IN THE PAST 12 MONTHS, WERE YOU HOMELESS OR LIVING IN A SHELTER (INCLUDING NOW)?: NO

## 2025-07-27 SDOH — SOCIAL STABILITY: SOCIAL INSECURITY: ARE YOU OR HAVE YOU BEEN THREATENED OR ABUSED PHYSICALLY, EMOTIONALLY, OR SEXUALLY BY ANYONE?: NO

## 2025-07-27 SDOH — SOCIAL STABILITY: SOCIAL INSECURITY: WITHIN THE LAST YEAR, HAVE YOU BEEN HUMILIATED OR EMOTIONALLY ABUSED IN OTHER WAYS BY YOUR PARTNER OR EX-PARTNER?: NO

## 2025-07-27 SDOH — SOCIAL STABILITY: SOCIAL NETWORK: IN A TYPICAL WEEK, HOW MANY TIMES DO YOU TALK ON THE PHONE WITH FAMILY, FRIENDS, OR NEIGHBORS?: TWICE A WEEK

## 2025-07-27 SDOH — ECONOMIC STABILITY: HOUSING INSECURITY: IN THE PAST 12 MONTHS, HOW MANY TIMES HAVE YOU MOVED WHERE YOU WERE LIVING?: 1

## 2025-07-27 SDOH — ECONOMIC STABILITY: FOOD INSECURITY: WITHIN THE PAST 12 MONTHS, YOU WORRIED THAT YOUR FOOD WOULD RUN OUT BEFORE YOU GOT THE MONEY TO BUY MORE.: NEVER TRUE

## 2025-07-27 SDOH — HEALTH STABILITY: PHYSICAL HEALTH: ON AVERAGE, HOW MANY DAYS PER WEEK DO YOU ENGAGE IN MODERATE TO STRENUOUS EXERCISE (LIKE A BRISK WALK)?: 7 DAYS

## 2025-07-27 SDOH — SOCIAL STABILITY: SOCIAL INSECURITY: WERE YOU ABLE TO COMPLETE ALL THE BEHAVIORAL HEALTH SCREENINGS?: YES

## 2025-07-27 SDOH — SOCIAL STABILITY: SOCIAL INSECURITY: DO YOU FEEL UNSAFE GOING BACK TO THE PLACE WHERE YOU ARE LIVING?: NO

## 2025-07-27 SDOH — SOCIAL STABILITY: SOCIAL INSECURITY: ARE THERE ANY APPARENT SIGNS OF INJURIES/BEHAVIORS THAT COULD BE RELATED TO ABUSE/NEGLECT?: NO

## 2025-07-27 SDOH — SOCIAL STABILITY: SOCIAL INSECURITY: HAS ANYONE EVER THREATENED TO HURT YOUR FAMILY OR YOUR PETS?: NO

## 2025-07-27 SDOH — SOCIAL STABILITY: SOCIAL INSECURITY: HAVE YOU HAD ANY THOUGHTS OF HARMING ANYONE ELSE?: NO

## 2025-07-27 SDOH — SOCIAL STABILITY: SOCIAL INSECURITY: ABUSE: ADULT

## 2025-07-27 SDOH — SOCIAL STABILITY: SOCIAL INSECURITY: POSSIBLE ABUSE REPORTED TO:: OTHER (COMMENT)

## 2025-07-27 SDOH — SOCIAL STABILITY: SOCIAL INSECURITY: DOES ANYONE TRY TO KEEP YOU FROM HAVING/CONTACTING OTHER FRIENDS OR DOING THINGS OUTSIDE YOUR HOME?: NO

## 2025-07-27 SDOH — SOCIAL STABILITY: SOCIAL INSECURITY: HAVE YOU HAD THOUGHTS OF HARMING ANYONE ELSE?: NO

## 2025-07-27 SDOH — SOCIAL STABILITY: SOCIAL INSECURITY: FAMILY BEHAVIORS: APPROPRIATE FOR SITUATION;COOPERATIVE;CALM;SUPPORTIVE

## 2025-07-27 SDOH — ECONOMIC STABILITY: TRANSPORTATION INSECURITY: IN THE PAST 12 MONTHS, HAS LACK OF TRANSPORTATION KEPT YOU FROM MEDICAL APPOINTMENTS OR FROM GETTING MEDICATIONS?: NO

## 2025-07-27 SDOH — SOCIAL STABILITY: SOCIAL INSECURITY: DO YOU FEEL ANYONE HAS EXPLOITED OR TAKEN ADVANTAGE OF YOU FINANCIALLY OR OF YOUR PERSONAL PROPERTY?: NO

## 2025-07-27 SDOH — HEALTH STABILITY: MENTAL HEALTH: BEHAVIORAL HEALTH(WDL): EXCEPTIONS TO WDL

## 2025-07-27 ASSESSMENT — LIFESTYLE VARIABLES
SKIP TO QUESTIONS 9-10: 0
HOW OFTEN DURING THE LAST YEAR HAVE YOU HAD A FEELING OF GUILT OR REMORSE AFTER DRINKING: NEVER
ANXIETY: MILDLY ANXIOUS
AUDIT-C TOTAL SCORE: 6
AUDIT TOTAL SCORE: 8
HAVE YOU OR SOMEONE ELSE BEEN INJURED AS A RESULT OF YOUR DRINKING: NO
HOW OFTEN DO YOU HAVE A DRINK CONTAINING ALCOHOL: 4 OR MORE TIMES A WEEK
HEADACHE, FULLNESS IN HEAD: NOT PRESENT
AGITATION: NORMAL ACTIVITY
VISUAL DISTURBANCES: NOT PRESENT
HOW OFTEN DURING THE LAST YEAR HAVE YOU BEEN UNABLE TO REMEMBER WHAT HAPPENED THE NIGHT BEFORE BECAUSE YOU HAD BEEN DRINKING: LESS THAN MONTHLY
CIWA TOTAL SCORE: 1
SKIP TO QUESTIONS 9-10: 0
HOW OFTEN DO YOU HAVE 6 OR MORE DRINKS ON ONE OCCASION: MONTHLY
HOW MANY STANDARD DRINKS CONTAINING ALCOHOL DO YOU HAVE ON A TYPICAL DAY: 1 OR 2
AUDITORY DISTURBANCES: NOT PRESENT
TOTAL_SCORE: 0
HOW OFTEN DURING THE LAST YEAR HAVE YOU FOUND THAT YOU WERE NOT ABLE TO STOP DRINKING ONCE YOU HAD STARTED: LESS THAN MONTHLY
HAS A RELATIVE, FRIEND, DOCTOR, OR ANOTHER HEALTH PROFESSIONAL EXPRESSED CONCERN ABOUT YOUR DRINKING OR SUGGESTED YOU CUT DOWN: NO
ORIENTATION AND CLOUDING OF SENSORIUM: ORIENTED AND CAN DO SERIAL ADDITIONS
AUDIT-C TOTAL SCORE: 6
PRESCIPTION_ABUSE_PAST_12_MONTHS: NO
PAROXYSMAL SWEATS: NO SWEAT VISIBLE
SUBSTANCE_ABUSE_PAST_12_MONTHS: YES
HOW OFTEN DURING THE LAST YEAR HAVE YOU FAILED TO DO WHAT WAS NORMALLY EXPECTED FROM YOU BECAUSE OF DRINKING: NEVER
TREMOR: NO TREMOR
NAUSEA AND VOMITING: NO NAUSEA AND NO VOMITING
HOW OFTEN DURING THE LAST YEAR HAVE YOU NEEDED AN ALCOHOLIC DRINK FIRST THING IN THE MORNING TO GET YOURSELF GOING AFTER A NIGHT OF HEAVY DRINKING: NEVER
AUDIT-C TOTAL SCORE: 6
AUDIT TOTAL SCORE: 2

## 2025-07-27 ASSESSMENT — ACTIVITIES OF DAILY LIVING (ADL)
DRESSING YOURSELF: INDEPENDENT
HEARING - LEFT EAR: FUNCTIONAL
PATIENT'S MEMORY ADEQUATE TO SAFELY COMPLETE DAILY ACTIVITIES?: YES
GROOMING: INDEPENDENT
FEEDING YOURSELF: INDEPENDENT
EFFECT OF PAIN ON DAILY ACTIVITIES: MINIMAL
WALKS IN HOME: INDEPENDENT
HEARING - RIGHT EAR: FUNCTIONAL
TOILETING: INDEPENDENT
JUDGMENT_ADEQUATE_SAFELY_COMPLETE_DAILY_ACTIVITIES: YES
BATHING: INDEPENDENT
LACK_OF_TRANSPORTATION: NO
ADEQUATE_TO_COMPLETE_ADL: YES

## 2025-07-27 ASSESSMENT — PATIENT HEALTH QUESTIONNAIRE - PHQ9
2. FEELING DOWN, DEPRESSED OR HOPELESS: NEARLY EVERY DAY
1. LITTLE INTEREST OR PLEASURE IN DOING THINGS: NEARLY EVERY DAY
SUM OF ALL RESPONSES TO PHQ9 QUESTIONS 1 & 2: 6

## 2025-07-27 ASSESSMENT — PAIN SCALES - GENERAL
PAINLEVEL_OUTOF10: 0 - NO PAIN
PAINLEVEL_OUTOF10: 0 - NO PAIN

## 2025-07-27 ASSESSMENT — PAIN - FUNCTIONAL ASSESSMENT
PAIN_FUNCTIONAL_ASSESSMENT: 0-10
PAIN_FUNCTIONAL_ASSESSMENT: 0-10

## 2025-07-27 ASSESSMENT — PAIN DESCRIPTION - DESCRIPTORS: DESCRIPTORS: PATIENT UNABLE TO DESCRIBE

## 2025-07-27 NOTE — PROGRESS NOTES
This was a signout on July 27, 2025 at 0600 hrs.  Patient's wife would like the patient to go to [facility].  [facility] did speak with them they do have a bed he was accepted by Dr. Thornton.  Will put the pink slip in and transfer accordingly.    Krista Guerra, DO

## 2025-07-27 NOTE — NURSING NOTE
Nurse Admission Note    Reason for admission in patient's own words:  help    Patient living arrangements prior to admission:  Most recent living environment    Legal status:  voluntarily    Medical consult notification to:  Jerrod    Admission folder given to:   Pt.    Problems/treatment plans:  Mental Health Assistance    Patient requests family to be notified of admission?    no  Person notified:  self    Strengths:  Willing to follow estimated treatment plan    Liabilities:  Self-care deficit    Previous mental health treatment:  Past Psychiatric History yes    Preliminary discharge planning needs:  Community resources

## 2025-07-27 NOTE — PROGRESS NOTES
Pharmacy Medication History Review    Roman Ellis is a 50 y.o. male admitted for No Principal Problem: There is no principal problem currently on the Problem List. Please update the Problem List and refresh.. Pharmacy reviewed the patient's [id number] medications and allergies for accuracy.    The list below reflectives the updated PTA list. Please review each medication in order reconciliation for additional clarification and justification.  Prior to Admission Medications   Prescriptions Last Dose Informant Patient Reported? Taking?   amLODIPine (Norvasc) 5 mg tablet 7/26/2025 Morning  No Yes   Sig: Take 1 tablet (5 mg) by mouth once daily.   folic acid (Folvite) 1 mg tablet 7/26/2025 Morning  No Yes   Sig: Take 1 tablet (1 mg) by mouth once daily.   gabapentin (Neurontin) 300 mg capsule 7/26/2025 Morning  No Yes   Sig: Take 1 capsule (300 mg) by mouth 3 times a day.   lisinopril 10 mg tablet 7/26/2025 Morning  No Yes   Sig: Take 1 tablet (10 mg) by mouth once daily.   lurasidone (Latuda) 40 mg tablet Past Week  No Yes   Sig: Take 1 tablet (40 mg) by mouth once daily in the evening. Take with meals.   multivitamin with minerals tablet Past Month  No Yes   Sig: Take 1 tablet by mouth once daily.   naltrexone (Depade) 50 mg tablet 7/26/2025 Morning  No Yes   Sig: Take 1 tablet (50 mg) by mouth once daily at bedtime.   nicotine (Nicoderm CQ) 14 mg/24 hr patch   No No   Sig: Place 1 patch over 24 hours on the skin once daily.   nicotine polacrilex (Nicorette) 2 mg gum   No No   Sig: Chew 1 each (2 mg) every 2 hours if needed for smoking cessation (nicotine craving, urge to smoke).   propranolol (Inderal) 10 mg tablet 7/26/2025 Morning  No Yes   Sig: Take 2 tablets (20 mg) by mouth 2 times a day.   tamsulosin (Flomax) 0.4 mg 24 hr capsule 7/26/2025 Morning  No Yes   Sig: Take 1 capsule (0.4 mg) by mouth once daily.   thiamine (Vitamin B-1) 100 mg tablet Not Taking  No No   Sig: Take 1 tablet (100 mg) by mouth  once daily.   Patient not taking: Reported on 7/27/2025   venlafaxine XR (Effexor-XR) 37.5 mg 24 hr capsule   No No   Sig: Take 1 capsule (37.5 mg) by mouth once daily.      Facility-Administered Medications: None            The list below reflectives the updated allergy list. Please review each documented allergy for additional clarification and justification.  Allergies  Reviewed by Anthony Chaudhry on 7/27/2025   No Known Allergies         Below are additional concerns with the patient's PTA list.      ANTHONY CHAUDHRY

## 2025-07-27 NOTE — CARE PLAN
The patient's goals for the shift include settle in    The clinical goals for the shift include orient to unit    Over the shift, the patient did not make progress toward the following goals. Barriers to progression include being newly admitted. Recommendations to address these barriers include orientation.

## 2025-07-27 NOTE — SIGNIFICANT EVENT
Application for Emergency Admission      Ready for Transfer?  Is the patient medically cleared for transfer to inpatient psychiatry: Yes  Has the patient been accepted to an inpatient psychiatric hospital: Yes    Application for Emergency Admission  IN ACCORDANCE WITH SE[place] 5122.10 [place]  The Chief Clinical Officer of: [facility] 7/27/2025 .11:29 AM    Reason for Hospitalization  The undersigned has reason to believe that: Roman Ellis Is a mentally ill person subject to hospitalization by court order under division B Section 5122.01 of the Revised Code, i.e., this person:    1.Yes  Represents a substantial risk of physical harm to self as manifested by evidence of threats of, or attempts at, suicide or serious self-inflicted bodily harm    2.No Represents a substantial risk of physical harm to others as manifested by evidence of recent homicidal or other violent behavior, evidence of recent threats that place another in reasonable fear of violent behavior and serious physical harm, or other evidence of present dangerousness    3.Yes Represents a substantial and immediate risk of serious physical impairment or injury to self as manifested by  evidence that the person is unable to provide for and is not providing for the person's basic physical needs because of the person's mental illness and that appropriate provision for those needs cannot be made  immediately available in the community    4.Yes Would benefit from treatment in a hospital for his mental illness and is in need of such treatment as manifested by evidence of behavior that creates a grave and imminent risk to substantial rights of others or  himself.    5.Yes Would benefit from treatment as manifested by evidence of behavior that indicates all of the following:       (a) The person is unlikely to survive safely in the community without supervision, based on a clinical determination.       (b) The person has a history of lack of  compliance with treatment for mental illness and one of the following applies:      (i) At least twice within the thirty-six months prior to the filing of an affidavit seeking court-ordered treatment of the person under section 5122.111 of the Revised Code, the lack of compliance has been a significant factor in necessitating hospitalization in a hospital or receipt of services in a forensic or other mental health unit of a correctional facility, provided that the thirty-six-month period shall be extended by the length of any hospitalization or incarceration of the person that occurred within the thirty-six-month period.      (ii) Within the forty-eight months prior to the filing of an affidavit seeking court-ordered treatment of the person under section 5122.111 of the Revised Code, the lack of compliance resulted in one or more acts of serious violent behavior toward self or others or threats of, or attempts at, serious physical harm to self or others, provided that the forty-eight-month period shall be extended by the length of any hospitalization or incarceration of the person that occurred within the forty-eight-month period.      (c) The person, as a result of mental illness, is unlikely to voluntarily participate in necessary treatment.       (d) In view of the person's treatment history and current behavior, the person is in need of treatment in order to prevent a relapse or deterioration that would be likely to result in substantial risk of serious harm to the person or others.    (e) Represents a substantial risk of physical harm to self or others if allowed to remain at liberty pending examination.    Therefore, it is requested that said person be admitted to the above named facility.    STATEMENT OF BELIEF    Must be filled out by one of the following: a psychiatrist, licensed physician, licensed clinical psychologist, health or ,  or .  (Statement shall include the  circumstances under which the individual was taken into custody and the reason for the person's belief that hospitalization is necessary. The statement shall also include a reference to efforts made to secure the individual's property at his residence if he was taken into custody there. Every reasonable and appropriate effort should be made to take this person into custody in the least conspicuous manner possible.)    Patient has suicidal thoughts with severe anxiety and depression.    Krista Guerra,  7/27/2025     _____________________________________________________________   Place of Employment: [facility] [facility]    STATEMENT OF OBSERVATION BY PSYCHIATRIST, LICENSED PHYSICIAN, OR LICENSED CLINICAL PSYCHOLOGIST, IF APPLICABLE    Place of Observation (e.g., [facility] mental health center, general hospital, office, emergency facility)  (If applicable, please complete)    Krista Guerra,  7/27/2025    _____________________________________________________________

## 2025-07-27 NOTE — PROGRESS NOTES
"EPAT - Social Work Psychiatric Assessment    Arrival Details  Mode of Arrival: Ambulance  Admission Source: Home  Admission Type: Voluntary at arrival (agreed to allow wife to bring him in)  EPAT Assessment Start Date: 07/26/25  EPAT Assessment Start Time: 1545  Name of : RIA Arevalo    History of Present Illness  Admission Reason: Psychiatric assessment ordered for severe mood disorder and eval of suicide risk  HPI: A review of previous and current electronic records was conducted prior to the patient interview. Specifically besides ED notes, his [facility] admit notes and the assess prior to the  admit.    Circumstance in which the patient has presented to the ED and initial clinical impressions as documented in the ED Provider Note upon admission:  This is a 50-year-old male, presenting to the emergency department for depression, anxiety, and states he can no longer live this way.  Patient was recently diagnosed with bipolar disorder, and has been working through medication changes to help with his symptoms.  Wife at bedside states that the patient is no longer manic as he initially presented, however is now the opposite, and is depressed, no longer able to care for himself at home because of his depression.  Patient denies any specific SI or HI, however wife states that yesterday, he went looking for his guns in the gun safe to \"clean them\".  Patient states that sometimes he gets intrusive thoughts, in this case that his guns were mireya, and he needed to check on them.  He denies any thoughts of harming himself and states that this was not his intention with looking at the guns.  Patient does have therapy and had a therapy appointment this week, and he does have a psychiatrist who prescribes him medications, however states he is unable to see this provider for another month, and again can no longer live this way.   Nurse triage note:  Pt presented to the ED with c/o psych eval. Pt was recent;y " "admitted and DC from psych and put on medication for bipolar. Pt denies SI/HI at this time but wife states he has been depressed. He was wanting not to be awake but not sleeping 24/7 and looking for the guns to \"clean\" them.  Pt denies everything at this time. He started new medications on 7/1 and feels like the have not been helping.    Hand-off nursing note:  Pt states that he either sleeps too much or not enough.  He denies any SI at this time but he states he is \"getting there\".  He was taking Latuda and it was causing him to not sleep and now the propranolol is causing him to sleep too much    SW Readmission Information   Readmission within 30 Days: No. Was inpatient 2N and HS early this summer / late spring, though    Psychiatric Symptoms  Generalized Anxiety Disorder: Difficult to control worry, Difficulity concentrating, Easily fatigued, Excessive anxiety/worry, Irritability, Sleep disturbance  Depression Symptoms: Appetite change, Change in energy level, Crying, Decreased libido, Feelings of helplessness, Feelings of hopelessess, Feelings of worthlessness, Impaired concentration, Increased irritability, Isolative, Loss of interest, Sleep hypo  Pearl Symptoms: No problems reported or observed for over a month  Hallucination Type: No problems reported or observed.  Delusion Type: No problems reported or observed.  Obsessive Compulsive Disorder: Ruminatory thoughts, which he qualifies as constant worry about his functioning and inability to concentrate or feel well enough to work  Post Traumatic Stress Disorder: Traumatic event per records  Delirium: No problems reported or observed.    Past Psychiatric History/Meds/Treatments    Previous Psychiatric Inpatient Hospitalizations  [facility] from 5/1/25 to 5/5/25 for depression with suicidal ideations and substance abuse, [facility] 5/19/25 for 8 days for mood, substance, etoh.    Previous Substance Abuse Treatment: Started with [facility] " facility [phone] early May and there was some relapse invovled. Attends AA/NA meetings now and remaining sober from etoh and thc.     Past Psychiatric Meds  Historically for years: venlafaxine XR (Effexor-XR) 75 mg 24 hr capsule; Take 1 capsule (75 mg) by mouth once daily.   Added as trial: Abilify 5 mg every morning and 10 mg nightly.  Stopped when patient found it ineffective.  Most recent strategy: Started Latuda 40 and discontined the Effexor.   Also: Gabapentin 300 mg up to 3x a day as needed.  See assessment comments summary about effectiveness and rationale for changes.    Current Mental Health Contacts   He (and his wife, actually) have been trying to get in with [facility], but the providers would cancel or switch and have re-schedule problems. It seems he has psychia and therapy finally secured for about a week from now. Since last discharge, his PCP has been as helpful as possible and a Dr. Antoine Zarate has offered some squeez-in psychotropic supervision until the [facility] treatment team can see him.     Mini Mental Status Exam   - Orientation: x4   - Appearance/Behaviour: Unremarkable appearance, other than expressionless. He is cooperative to the best of his ability, providing details requested of him.   - Mood: Depression is apparent and he admits to high anxiety   - Affect: Flat   - Speech: Normal pace, slightly lower than normal volume and pace.   - Perception (Auditory/Visual Hallucinations): None endorsed.   - Thought Content (Suicidal/Homicidal Ideation) and Process: Suicidal risk is apparent; No HI apparent; No delusions apparent.   - Cognition: Admittedly compromised d/t depression and anxiety symptoms   - Insight and Judgement: His inpatient psychiatry notes have shown good insight and judgment by discharge, but his current symptom severity is compromising the insight and judgement that rationalizes his interest in handling his firearms at the moment. He does, however, demonstrate  "thoughtfulness about how his stages of addiction and recovery may have been effecting his psychotropics concurrent at the time, states his wish for successful treatment while feeling hopeless about ever getting better.     Social/Cultural History    Support System: Immediate family  Living Arrangement: House. Lives with spouse of 20 years, known each other since age 17. They have a couple young sons in the house.   Feels Safe Living in Home: Yes  Social History: [place].  No Guardian/POA  Current Stressors:   Cultural Requests During Hospitalization: None  Spiritual Requests During Hospitalization: None    Legal Concerns  None now. was arrested in 2022 for possession of delta 8 THC     Income Information  Employment Status for: Patient  Employment Status: Temp Disabled  Income Source: Temp Disability and spouse  Income/Expense Information: Income meets expenses  Financial Concerns: None  Who Manages Finances if Patient Unable: Spouse     Drug Screening    Have you used any substances (canabis, cocaine, heroin, hallucinogens, inhalants, etc.) in the past 12 months?: Yes. Had been using dispensery quality marijuana q15min every day all day until he completely stopped over the past couple months. Same with alcohol. Prior assessments list a few other substances he used as he could, but now sober from all.   Have you used any prescription drugs other than prescribed in the past 12 months?: No  Is a toxicology screen needed?: Yes per protocol. Drug screen is negative today    Stage of Change: Maintenance  History of Treatment: Started with [facility] facility [phone] early May and there was some relapse invovled. Attends AA/NA meetings now and remaining sober from etoh and thc.   History of use from his last psych consult note: He has used LSD, cocaine, OxyContin, heroin, meth, \"any pill he could get his hands on\"; from ages [age]; had 3 relapses from [age] with percocet and cocaine. 2022 used Delta 8 " "THC. Wife reported in prior assess interview that he used alcohol 30 years - but working and functional until this summer.     Risk Assessments    Violence Risk Assessment  Assessment of Violence: On admission  Thoughts of Harm to Others: No    Risk Factors  Self Harm/Suicidal Ideation Plan: None currently that he overtly admits to.  Previous Self Harm/Suicidal Plans: Plans: Thoughts to use guns he owns, attempted to access again over the past days per wife but stated he just want to oil them.  Description of Thoughts/Ideas Leaving Unit Now: Not certain that he would not choose to develp plan accompanied with intent. To quote: \"I'm not suicidal right now, but I am getting there\"    Suicide Risk Screen - Ability to Assess: Able to be screened  Ask Suicide-Screening Questions  1. In the past few weeks, have you wished you were dead?: Yes  2. In the past few weeks, have you felt that you or your family would be better off if you were dead?: No \"I'm not there yet.\"  3. In the past week, have you been having thoughts about killing yourself?: Yes  4. Have you ever tried to kill yourself?: No  5. Are you having thoughts of killing yourself right now?: No  Calculated Risk Score: Potential Risk    [place] Suicide Severity Rating Scale (Screener/Recent Self-Report)  1. Wish to be Dead (Past 1 Month): Yes  2. Non-Specific Active Suicidal Thoughts (Past 1 Month): Yes  3. Active Suicidal Ideation with any Methods (Not Plan) Without Intent to Act (Past 1 Month): Yes (but slow to admit it)  4. Active Suicidal Ideation with Some Intent to Act, Without Specific Plan (Past 1 Month): No. Denies intent   5. Active Suicidal Ideation with Specific Plan and Intent (Past 1 Month): No  6. Suicidal Behavior (Lifetime): No  Calculated C-SSRS Risk Score (Lifetime/Recent): Moderate Risk    Step 1: Risk Factors  Current & Past Psychiatric Dx: Mood disorder, PTSD, Recent onset in terms of severity just this month  Presenting Symptoms: Anhedonia, " "Impulsivity, Hopelessness or despair, Anxiety high  Family History: Suicide by grandfather  Precipitants/Stressors: Hopelessness over ineffective psychotropics, anxiety over ability to work again.  Change in Treatment: Recent inpatient discharge, Change in provider or treament (i.e., medications, psychotherapy, milieu), Hopeless or dissatisfied with pharacuetical treatment  Access to Lethal Methods : Not easily, but he could. The wife is working on a better safety plan while he is inpatient this time. See assessment summary note for details.    Step 2: Protective Factors   Protective Factors Internal: Identifies reasons for living, specifically his kids.  Protective Factors External: Responsibility to children, Supportive spouse    Step 3: Suicidal Ideation Intensity  Most Severe Suicidal Ideation Identified: \"I'm getting there\" and wife said he stated some reason for wanting to dig out the closet for gun cabinet access, although does not want to admit to any intent  How Many Times Have You Had These Thoughts (not plan or intent, but the thought that he cannot live with this mood disorder anymore): Many times each day  When You Have the Thoughts How Long do They Last : More than 8 hours/persistent or continuous  Could/Can You Stop Thinking About Killing Yourself or Wanting to Die if You Want to: Can control thoughts with a lot of difficulty  Are There Things - Anyone or Anything - That Stopped You From Wanting to Die or Acting on: Deterrents definitely stopped you from attempting suicide (the kids)  What Sort of Reasons Did You Have For Thinking About Wanting to Die or Killing Yourself: Completely to end or stop the pain (you couldn't go on living with the pain or how you were feeling)  Total Score: 20    Clinical Documentation of Risk Level: Moderate suicide risk. Rationale: Not high with admission onto locked unit and no thoughts on how to end his life on inpatient unit; and he has not yet attempted suicide in his " lifetime yet. Not low risk because his intensity score is 20, saying he has virtually no control over his sadness and thoughts that he cannot live like this anymore.     Psychiatric Impression and Plan of Care     DIAGNOSTIC IMPRESSION based upon previous and current evaluative information:   Had been diagnosed with bipolar a couple months ago; r/o Major Depressive Episode, recurr, sev w/o psychotic features; Gen Anx Disorder; Cannibus dependence in remission; Alcohol dependence in remission; h/o PTSD documented.       ASSESSMENT NOTES: 15 min chart review first; 30 min telemedicine video interview following patient identification.      Presents with suicide risk; no indications of HI, hallucinations or delusions.   The patient told me that he has never felt more depressed that he has over the past month - and that his anxiety has concurrently returned, not a problem in the recent prior consults. He affirmed having the following feelings'/thoughts: Hopelessness, helplessness, worthlessness d/t fear that he will not be able to return to work (on temp disability this summer d/t mood but due to start again in a week), anhedonia, inability to concentrate on anything, cannot avoid sleeping, reports losing 30 lbs over the past couple months and still has no appetite. Must force eating whenever he can. Wife reports that he had been gregarious and social prior to this episode over the summer, but now sobs on her. She insisted that he go to the ED today when he demonstrated low frustration tolerance and punched a wall today, which is not usual. He conceded and agreed willingly.     Regarding the patient's psychotropics, per history from patient and spouse today with comparisons to the chart records:  He used to have anxiety attacks per wife, has had anxiety diagnosis all his life, but able to work and remained functional on effexor:  venlafaxine XR (Effexor-XR) 75 mg 24 hr capsule; Take 1 capsule (75 mg) by mouth once  daily.  This prescription remained his treatment as psych inpatient.  After discharge, he had trouble getting in with outpt psychiatry, but PCP and Dr. Zarate patched over and trialed the followin/4 note: Abilify 5 mg every morning and 10 mg nightly.    Dr Zarate noted it was initially helpful with the Effexor, but Patient (and wife) reported that the Abilify stopped being effective.  Dr. Thornton prescribed adjustment, trialing a hopefully more effective strategy: Started Latuda 40 and discontined the Effexor. Rationale: His more recent hospitalization noted manic symptoms of irritability, sleeping only 2-5 hours nightly and some loose associations noted.   The patient and spouse commented that he had been asymptomatic of anxiety for years - and his admitting psych assessments/consults confirmed the absence - but has been very anxious since the Effexor was discontinued.    noted from Dr. Zarate that he renewed the  Gabapentin 300 mg up to 3x a day as needed. When I asked the patient about it, he said that the gabapentin is the only thing that alleviates his anxiety at all now.   Regarding the mood disorder that has included bipolar as of the past month, the patient and the spouse independently told me that they suspect he may not actually be bipolar. Reason: They report that the manic symptoms co-incided with the high TCH intake of about a year and a half. Now the THC has cleared his system, he is no longer drinking and now more dysthymia with feelings/thoughts of hopelessness/helplessness, accompanied by inability to stop worrying with high anxiety.     Access to Lethal Methods : Not easily, but he could. The prior hosptialization safety plan included putting his gun cabinet into the very back of their deep bedroom closet. Reason: He can actually open it with either a code (and it's written somewhere but the wife doesn't know where it is exactly) or without the code by using his thumb print. He was good about  leaving it alone until now, wife reporting that she couldn't get into the bedroom, the door partly blocked because he had the closet door open with the contents partially pulled out into the bedroom. She confronted him about it, and he said the guns have not been touched in a very long time and they need maintenance, saying that they need to be oiled sometimes. He has been anhedonic, unable to concentrate well enought to even load the , but he was able to begin emptying the closet for gun maintence after a month of what he refers to as his most depressed and anxious ever. Considering the other factors mentioned compromises the the credibility of his stated intent of handling them as simply gun maintenance.    My question: Can you remove the gun cabinet from the house? Spouse reply: Not easily. It is 4 ft x 7 ft.   The obvious solution that was never mentioned would be his cooperation with removal plan. For example, they could have an officer to the house, have the patient open the cabinet with thumbprint or code, yeild the firearms to the officer to process secure location per protocol. Instead, the spouse offers this in response to my question: She will first search for the code. If unable to locate it, she will call a  that can open such lock for her before the patient's discharge.       COLLATERAL information interviewing: Wife, 15 min phone interview following patient interview    CONTRIBUTING factors to this ED visit that were present in prior assessments: Changes in prescriptions; Had used alcohol and thc, but now adjusting to sobriety while trying to assess mood and best treatments.    THRIVE services considered to address substance use: n/a    AGITATION Assessment: Is patient presenting as agitated? Not at all. Flat. He is almost listless.    PLAN OF CARE: ADMISSION. Following the psych assessment, EPAT consulted with Dr. Luque who concurred that the patient is not safely functional  without inpatient psych treatment at this point. His [place] risk score is 20, he reports never feeling more depressed than he does now, he cannot function and his baseline as of a few months ago was high functioning, albeit alcoholic; his greatest energy expenditure has been emptying the closet to get to his guns and will not acknowledg that this is odd or concerning behavior in his current condition; and the patient is quietly in agreement after having reviewed the severity of his symptoms with me. Therefor, Dr. Luque put in the order for placement after consulting with her.   Specific Resources Provided to Patient: Deferred to discharge planning team  CM Notified: n/a  PHP/IOP Recommended: Deferred to discharge planning team    Outcome/Disposition  Patient Disposition: Adult Inpatient Psych  Patient's Perception of Outcome Achieved: Patient was ultimately in agreement with recommendations. He came because spouse insisted based on severity of mood symptoms. By the end of the half hour video interview, he heard himself say how non-functional and very depressed he is, worse that ever, wanting to avoid suicide because of the kids - and conceded to inpatient treatment.   Assessment, Recommendations and Risk Level Reviewed with: ED Provider, Dr. Luque  Contact Name: spouse Loi [phone]  EPAT Assessment Completed Date: 07/26/25  EPAT Assessment Completed Time: 1650

## 2025-07-27 NOTE — GROUP NOTE
Group Topic: Reminiscence   Group Date: 7/27/2025  Start Time: 1345  End Time: 1430  Facilitators: BOSSMAN Robledo   Department: [facility]IRT[facility]    Number of Participants: 11   Group Focus: other Can You Name 5?  Treatment Modality: Other: Recreation Therapy  Interventions utilized were group exercise, mental fitness, and reminiscence  Purpose: other: fun, elevate mood, increase socialization, enhance self esteem    Name: Roman Ellis YOB: 1975   MR: [id number]      Facilitator: Recreational Therapist  Level of Participation: did not attend  Quality of Participation: did not attend  Interactions with others: did not attend  Mood/Affect: did not attend  Triggers (if applicable): n/a  Cognition: did not attend  Progress: None  Comments: pt problem is depressed mood.  Pt is getting admitted to unit at this time.  No handout to offer.  Plan: continue with services

## 2025-07-27 NOTE — SIGNIFICANT EVENT
Application for Emergency Admission      Ready for Transfer?  Is the patient medically cleared for transfer to inpatient psychiatry: Yes  Has the patient been accepted to an inpatient psychiatric hospital: Yes    Application for Emergency Admission  IN ACCORDANCE WITH [place] 5122[place]10 [place]  The Chief Clinical Officer of: [facility] 7/26/2025 .10:48 PM    Reason for Hospitalization  The undersigned has reason to believe that: Roman Ellis Is a mentally ill person subject to hospitalization by court order under division B Section 5122.01 of the Revised Code, i.e., this person:    1.No  Represents a substantial risk of physical harm to self as manifested by evidence of threats of, or attempts at, suicide or serious self-inflicted bodily harm    2.No Represents a substantial risk of physical harm to others as manifested by evidence of recent homicidal or other violent behavior, evidence of recent threats that place another in reasonable fear of violent behavior and serious physical harm, or other evidence of present dangerousness    3.No Represents a substantial and immediate risk of serious physical impairment or injury to self as manifested by  evidence that the person is unable to provide for and is not providing for the person's basic physical needs because of the person's mental illness and that appropriate provision for those needs cannot be made  immediately available in the community    4.No Would benefit from treatment in a hospital for his mental illness and is in need of such treatment as manifested by evidence of behavior that creates a grave and imminent risk to substantial rights of others or  himself.    5.Yes Would benefit from treatment as manifested by evidence of behavior that indicates all of the following:       (a) The person is unlikely to survive safely in the community without supervision, based on a clinical determination.       (b) The person has a history of lack of compliance  with treatment for mental illness and one of the following applies:      (i) At least twice within the thirty-six months prior to the filing of an affidavit seeking court-ordered treatment of the person under section 5122.111 of the Revised Code, the lack of compliance has been a significant factor in necessitating hospitalization in a hospital or receipt of services in a forensic or other mental health unit of a correctional facility, provided that the thirty-six-month period shall be extended by the length of any hospitalization or incarceration of the person that occurred within the thirty-six-month period.      (ii) Within the forty-eight months prior to the filing of an affidavit seeking court-ordered treatment of the person under section 5122.111 of the Revised Code, the lack of compliance resulted in one or more acts of serious violent behavior toward self or others or threats of, or attempts at, serious physical harm to self or others, provided that the forty-eight-month period shall be extended by the length of any hospitalization or incarceration of the person that occurred within the forty-eight-month period.      (c) The person, as a result of mental illness, is unlikely to voluntarily participate in necessary treatment.       (d) In view of the person's treatment history and current behavior, the person is in need of treatment in order to prevent a relapse or deterioration that would be likely to result in substantial risk of serious harm to the person or others.    (e) Represents a substantial risk of physical harm to self or others if allowed to remain at liberty pending examination.    Therefore, it is requested that said person be admitted to the above named facility.    STATEMENT OF BELIEF    Must be filled out by one of the following: a psychiatrist, licensed physician, licensed clinical psychologist, health or ,  or .  (Statement shall include the circumstances  under which the individual was taken into custody and the reason for the person's belief that hospitalization is necessary. The statement shall also include a reference to efforts made to secure the individual's property at his residence if he was taken into custody there. Every reasonable and appropriate effort should be made to take this person into custody in the least conspicuous manner possible.)    This is a 50-year-old male with history of depression, anxiety, bipolar who has been depressed no longer taking care of himself and denying suicidal ideation but stating he can no longer live this way anymore.  Has been spending a lot of times looking for his guns which he states to clean them and get intrusive thoughts.    Alex Sewell,  7/26/2025     _____________________________________________________________   Place of Employment: uh    STATEMENT OF OBSERVATION BY PSYCHIATRIST, LICENSED PHYSICIAN, OR LICENSED CLINICAL PSYCHOLOGIST, IF APPLICABLE    Place of Observation (e.g., [facility] mental health center, general hospital, office, emergency facility)  (If applicable, please complete)    Alex Sewell DO 7/26/2025    _____________________________________________________________

## 2025-07-28 ENCOUNTER — APPOINTMENT (OUTPATIENT)
Dept: PRIMARY CARE | Facility: CLINIC | Age: 50
End: 2025-07-28
Payer: COMMERCIAL

## 2025-07-28 PROBLEM — F31.4 SEVERE DEPRESSED BIPOLAR I DISORDER WITHOUT PSYCHOTIC FEATURES (MULTI): Status: ACTIVE | Noted: 2025-07-28

## 2025-07-28 LAB
CHOLEST SERPL-MCNC: 164 MG/DL (ref 0–199)
CHOLESTEROL/HDL RATIO: 4.5
GLUCOSE P FAST SERPL-MCNC: 122 MG/DL (ref 74–99)
HDLC SERPL-MCNC: 36.1 MG/DL
LDLC SERPL CALC-MCNC: 109 MG/DL
NON HDL CHOLESTEROL: 128 MG/DL (ref 0–149)
TRIGL SERPL-MCNC: 93 MG/DL (ref 0–149)
VLDL: 19 MG/DL (ref 0–40)

## 2025-07-28 PROCEDURE — 2500000001 HC RX 250 WO HCPCS SELF ADMINISTERED DRUGS (ALT 637 FOR MEDICARE OP)

## 2025-07-28 PROCEDURE — 99222 1ST HOSP IP/OBS MODERATE 55: CPT

## 2025-07-28 PROCEDURE — 2500000001 HC RX 250 WO HCPCS SELF ADMINISTERED DRUGS (ALT 637 FOR MEDICARE OP): Performed by: INTERNAL MEDICINE

## 2025-07-28 PROCEDURE — 80061 LIPID PANEL: CPT | Performed by: STUDENT IN AN ORGANIZED HEALTH CARE EDUCATION/TRAINING PROGRAM

## 2025-07-28 PROCEDURE — 99253 IP/OBS CNSLTJ NEW/EST LOW 45: CPT | Performed by: INTERNAL MEDICINE

## 2025-07-28 PROCEDURE — 2500000002 HC RX 250 W HCPCS SELF ADMINISTERED DRUGS (ALT 637 FOR MEDICARE OP, ALT 636 FOR OP/ED)

## 2025-07-28 PROCEDURE — 82947 ASSAY GLUCOSE BLOOD QUANT: CPT | Performed by: STUDENT IN AN ORGANIZED HEALTH CARE EDUCATION/TRAINING PROGRAM

## 2025-07-28 PROCEDURE — 2500000002 HC RX 250 W HCPCS SELF ADMINISTERED DRUGS (ALT 637 FOR MEDICARE OP, ALT 636 FOR OP/ED): Performed by: INTERNAL MEDICINE

## 2025-07-28 PROCEDURE — 1140000001 HC PRIVATE PSYCH ROOM DAILY

## 2025-07-28 PROCEDURE — 36415 COLL VENOUS BLD VENIPUNCTURE: CPT | Performed by: STUDENT IN AN ORGANIZED HEALTH CARE EDUCATION/TRAINING PROGRAM

## 2025-07-28 RX ORDER — TAMSULOSIN HYDROCHLORIDE 0.4 MG/1
0.4 CAPSULE ORAL DAILY
Status: DISCONTINUED | OUTPATIENT
Start: 2025-07-28 | End: 2025-08-07 | Stop reason: HOSPADM

## 2025-07-28 RX ORDER — GABAPENTIN 300 MG/1
300 CAPSULE ORAL 3 TIMES DAILY
Status: DISCONTINUED | OUTPATIENT
Start: 2025-07-28 | End: 2025-07-28

## 2025-07-28 RX ORDER — FOLIC ACID 1 MG/1
1 TABLET ORAL DAILY
Status: DISCONTINUED | OUTPATIENT
Start: 2025-07-28 | End: 2025-08-07 | Stop reason: HOSPADM

## 2025-07-28 RX ORDER — SERTRALINE HYDROCHLORIDE 25 MG/1
25 TABLET, FILM COATED ORAL DAILY
Status: DISCONTINUED | OUTPATIENT
Start: 2025-07-28 | End: 2025-07-30

## 2025-07-28 RX ORDER — AMLODIPINE BESYLATE 5 MG/1
5 TABLET ORAL DAILY
Status: DISCONTINUED | OUTPATIENT
Start: 2025-07-28 | End: 2025-08-07 | Stop reason: HOSPADM

## 2025-07-28 RX ORDER — LISINOPRIL 10 MG/1
10 TABLET ORAL DAILY
Status: DISCONTINUED | OUTPATIENT
Start: 2025-07-28 | End: 2025-07-28

## 2025-07-28 RX ORDER — LITHIUM CARBONATE 300 MG/1
600 TABLET, FILM COATED, EXTENDED RELEASE ORAL NIGHTLY
Status: DISCONTINUED | OUTPATIENT
Start: 2025-07-28 | End: 2025-07-31

## 2025-07-28 RX ADMIN — AMLODIPINE BESYLATE 5 MG: 5 TABLET ORAL at 14:44

## 2025-07-28 RX ADMIN — LITHIUM CARBONATE 600 MG: 300 TABLET, EXTENDED RELEASE ORAL at 21:12

## 2025-07-28 RX ADMIN — FOLIC ACID 1 MG: 1 TABLET ORAL at 14:44

## 2025-07-28 RX ADMIN — SERTRALINE 25 MG: 25 TABLET, FILM COATED ORAL at 13:26

## 2025-07-28 RX ADMIN — GABAPENTIN 300 MG: 300 CAPSULE ORAL at 14:44

## 2025-07-28 RX ADMIN — TAMSULOSIN HYDROCHLORIDE 0.4 MG: 0.4 CAPSULE ORAL at 14:44

## 2025-07-28 SDOH — HEALTH STABILITY: MENTAL HEALTH: SUBSTANCE USE: PT HAS USED LSD, COCAINE, OXYCONTIN, HEROIN, METH, PERCOCET, COCAINE, DELTA 8 THC OVER THE YEARS

## 2025-07-28 SDOH — HEALTH STABILITY: MENTAL HEALTH: MENTAL HEALTH TREATMENT: COUNSELING

## 2025-07-28 SDOH — HEALTH STABILITY: MENTAL HEALTH: BEHAVIOR: ORIENTED

## 2025-07-28 SDOH — HEALTH STABILITY: MENTAL HEALTH

## 2025-07-28 SDOH — ECONOMIC STABILITY: HOUSING INSECURITY: FEELS SAFE LIVING IN HOME: YES

## 2025-07-28 SDOH — HEALTH STABILITY: MENTAL HEALTH: PREVIOUS MENTAL HEALTH TREATMENT: INPATIENT TREATMENT

## 2025-07-28 SDOH — HEALTH STABILITY: MENTAL HEALTH: ANXIETY SYMPTOMS: GENERALIZED

## 2025-07-28 SDOH — HEALTH STABILITY: MENTAL HEALTH: FAMILY HISTORY SUBSTANCE USE: OTHER (COMMENT)

## 2025-07-28 SDOH — HEALTH STABILITY: MENTAL HEALTH: MENTAL HEALTH CONDITIONS/ SYMPTOMS: DEPRESSION

## 2025-07-28 SDOH — HEALTH STABILITY: MENTAL HEALTH
DEPRESSION SYMPTOMS: APPETITE CHANGE;CHANGE IN ENERGY LEVEL;FEELINGS OF HOPELESSESS;LOSS OF INTEREST;ISOLATIVE;SLEEP DISTURBANCE

## 2025-07-28 SDOH — HEALTH STABILITY: MENTAL HEALTH: REPORTED TYPE OF SUBSTANCE: ALCOHOL;STREET DRUG

## 2025-07-28 SDOH — SOCIAL STABILITY: SOCIAL INSECURITY: RELIGIOUS/CULTURAL FACTORS: NONE REPORTED

## 2025-07-28 ASSESSMENT — PAIN SCALES - GENERAL
PAINLEVEL_OUTOF10: 0 - NO PAIN
PAINLEVEL_OUTOF10: 0 - NO PAIN

## 2025-07-28 ASSESSMENT — PAIN - FUNCTIONAL ASSESSMENT
PAIN_FUNCTIONAL_ASSESSMENT: 0-10
PAIN_FUNCTIONAL_ASSESSMENT: 0-10

## 2025-07-28 ASSESSMENT — ENCOUNTER SYMPTOMS
APPETITE CHANGE: 1
ACTIVITY CHANGE: 1
FATIGUE: 1

## 2025-07-28 ASSESSMENT — COLUMBIA-SUICIDE SEVERITY RATING SCALE - C-SSRS
6. HAVE YOU EVER DONE ANYTHING, STARTED TO DO ANYTHING, OR PREPARED TO DO ANYTHING TO END YOUR LIFE?: NO
1. SINCE LAST CONTACT, HAVE YOU WISHED YOU WERE DEAD OR WISHED YOU COULD GO TO SLEEP AND NOT WAKE UP?: NO

## 2025-07-28 ASSESSMENT — LIFESTYLE VARIABLES
SUBSTANCE_ABUSE_PAST_12_MONTHS: YES
PRESCIPTION_ABUSE_PAST_12_MONTHS: NO

## 2025-07-28 ASSESSMENT — ACTIVITIES OF DAILY LIVING (ADL): BATHING: NO ASSISTANCE

## 2025-07-28 NOTE — PROGRESS NOTES
"Psychosocial assessment completed with pt and from medical records. Pt is a 50 yr old  male admitted for depression and SI. Pt has had gradual SI starting 2 weeks ago. Pt was going to clean his guns in preparation for possible SA although pt denies those were his intentions. Pt made statement in the ED that he can \"no longer live this way\". Pt has recent diagnosis of Bipolar disorder, past hx of substance and alcohol use. Pt reports that he has been sober for a couple of months. Pt had previous admission to [facility] [place] in May of this year. Pt was presenting with manic behavior at that time. Pt has been trying to receive services through [facility] but there had been circumstances such as scheduling issues from preventing successful linkage. Pt is currently receiving therapy services through [facility] and is currently not linked with a provider at this time. Pt has hx of substance use that includes  LSD, OxyContin, heroin, meth (all 18-30), percocet, cocaine (30-40), Delta 8 THC (2022). As well as alcohol for 30 yrs. Pt has been able to be functional while using substances until this past May. Upon discharge in May he received treatment at [facility]. Per records pt attends AA/NA meetings. Pt lives with his wife and two sons. Pt's wife brought pt to the ED for concerns with pt's worsening depression and concern for safety. Pt not been eating and had increased sleep. Pt has lost approximately 40 pounds since April. Pt presented with flat affect and guarded. Pt had limited engagement. Pt declined any questions or concerns at this time. Pt is A&Ox4. SW to follow for structure, support, and discharge planning.     NIXON Ross LSW   "

## 2025-07-28 NOTE — GROUP NOTE
Group Topic: Goals   Group Date: 7/27/2025  Start Time: 2010  End Time: 2025  Facilitators: Mandy Vela   Department: [facility] Geriatric     Number of Participants: 12   Group Focus: check in  Treatment Modality: Other: interpersonal therapy  Interventions utilized were other daily check in, daily reflection  Purpose: feelings and self-care    Name: Roman Ellis YOB: 1975   MR: [id number]      Facilitator: Mental Health [facility]  Level of Participation: did not attend  Quality of Participation: was invited, did not attend group  Interactions with others: appropriate  Mood/Affect: appropriate  Triggers (if applicable): none  Cognition: coherent/clear  Progress: Minimal  Comments: patient presented with depression  Plan: continue with services
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
## 2025-07-28 NOTE — GROUP NOTE
Group Topic: Goals   Group Date: 7/28/2025  Start Time: 0730  End Time: 0800  Facilitators: Kyle Ellis   Department: [facility] Geriatric     Number of Participants: 13   Group Focus: goals  Treatment Modality: Patient-Centered Therapy  Interventions utilized were assignment and group exercise  Purpose: coping skills and self-care    Name: Roman Ellis YOB: 1975   MR: [id number]      did not attend

## 2025-07-28 NOTE — GROUP NOTE
"Group Topic: Stress Reduction/Relaxation   Group Date: 7/28/2025  Start Time: 1040  End Time: 1130  Facilitators: BOSSMAN Robledo   Department: [facility] REHABTH VIRTUAL    Number of Participants: 11   Group Focus: other Stress Management Game  Treatment Modality: Other: Recreation Therapy  Interventions utilized were exploration, group exercise, patient education, problem solving, and support  Purpose: coping skills, maladaptive thinking, feelings, irrational fears, communication skills, insight or knowledge, self-worth, self-care, relapse prevention strategies, and trigger / craving management    Name: Roman Ellis YOB: 1975   MR: [id number]      Facilitator: Recreational Therapist  Level of Participation: did not attend  Quality of Participation: did not attend  Interactions with others: did not attend  Mood/Affect: did not attend  Triggers (if applicable): n/a  Cognition: did not attend  Progress: None  Comments: pt problem is depressed mood.  Pt refuses to attend group at this time.  Reports he \"just doesn't have the energy\".  Resting in his room.  No handout to offer.  Plan: continue with services      "

## 2025-07-28 NOTE — CARE PLAN
The clinical goals for the shift include maintain safety, promote rest.    Over the shift, the patient did make progress toward the following goals.     Problem: Pain - Adult  Goal: Verbalizes/displays adequate comfort level or baseline comfort level  Outcome: Progressing

## 2025-07-28 NOTE — CARE PLAN
The patient's goals for the shift include settle in    The clinical goals for the shift include Maintain safety, promote sleep    Over the shift, the patient did not make progress toward the following goals. Barriers to progression include . Recommendations to address these barriers include .

## 2025-07-28 NOTE — ASSESSMENT & PLAN NOTE
Roman Ellis is a 50 y.o. male with a past medical history of recently diagnosed bipolar disorder, past history of anxiety, depression, and substance use disorder here for SI and worsening depressive symptoms.     He has has multiple recent admissions complicated by drug and alcohol use, however most recently with a diagnosis of bipolar disorder. Per patient, he has been feeling increasingly depressed over the past month, much worse over the past two weeks. It is likely that his current clinical picture is an acute depressive episode in the context of his bipolar disorder. He will require medication adjustments to best address his current clinical picture. He otherwise denies current suicidality, but exhibited concerning symptoms for suicidality outpatient and remains at moderate to high risk of suicide outpatient without additional treatment. He requires inpatient admission for medication adjustments and close clinical monitoring.     Plan  Biological -   - start sertraline (25 mg and titrate up as tolerated)   - start lithium (600 mg ER)    - AM TSH  - Lithium levels on day 3 of medication     2. Psychological -     Patient is encouraged to participate with the therapeutic milieu and program and group therapy      3. Sociological -     Patient encouraged to cooperate with social work staff on issues relevant to discharge planning    4. Patient discussion -     Plan discussed with patient who concurs with the assessment, treatment plan, and with the following informed consent for medication:   The benefits, risks, most common side effects, possible consequences of not taking the medication, and alternatives to the recommended treatment has been discussed at length.

## 2025-07-28 NOTE — NURSING NOTE
"VANNESSA NOTE     Problem:  Depression     Behavior:  Pt isolative to room this evening. Flat affect noted, minimally interactive. Pt expresses he is hopeful to find relief for depressive symptoms, stating \"That's why I'm here.\" Pt denies SI this shift.  Group Participation: n/a  Appetite/Meals: Declined HS snack     Interventions:  1:1 provided with reassurance. No evening meds scheduled, no PRNs given.    Response:  Pt appears to be resting in bed overnight. No s/s of distress noted.     Plan:  Will continue to monitor behaviors and effectiveness of interventions while maintaining pt safety.    "

## 2025-07-28 NOTE — CONSULTS
[facility]: [facility]  CONSULT NOTE      Roman Ellis is a 50 y.o. male that is being seen  today for  medical management at [facility].  Subjective   Patient is a 50-year-old male with a history of hypertension, hyperlipidemia, recently diagnosed with bipolar disorder who is being seen for medical management of [facility].  Patient was brought to the ER for evaluation of depression and anxiety and suicidal ideations.  Patient was evaluated and medically cleared for admission to [facility].  Patient lab work reviewed.  Kidney and liver functions were stable.  White cell count is normal.  Patient's cholesterol level is normal.      ROS  Negative for fever or chills  Negative for sore throat, ear pain, nasal discharge  Negative for cough, shortness of breath or wheezing  Negative for chest pain, palpitations, swelling of legs  Negative for abdominal pain, constipation, diarrhea, blood in the stools  Negative for urinary complaints  Negative for headache, dizziness, weakness or numbness  Negative for joint pain  Positive  for depression or anxiety  All other systems reviewed and were negative   Vitals:    07/28/25 2041   BP: 118/70   Pulse: 58   Resp: 16   Temp: 36.4 °C (97.5 °F)   SpO2: 100%      Vitals:    07/27/25 1357   Weight: 79.4 kg (175 lb)     Body mass index is 25.84 kg/m².  Physical Exam  Constitutional: Patient does not appear to be in any acute distress  Head and Face: NCAT  Eyes: Normal external exam, EOMI  ENT: Normal external inspection of ears and nose. Oropharynx normal.  Cardiovascular: RRR, S1/S2, no murmurs, rubs, or gallops, radial pulses +2, no edema of extremities  Pulmonary: CTAB, no respiratory distress.  Abdomen: +BS, soft, non-tender, nondistended, no guarding or rebound, no masses noted  MSK: No joint swelling, normal movements of all extremities. Range of motion- normal.  Skin- No lesions, contusions, or erythema.  Peripheral puslses palpable bilaterally 2+  Neuro:  AAO X3, Cranial nerves 2-12 grossly intact,DTR 2+ in all 4 limbs       LABS   [unfilled]  Lab Results   Component Value Date    GLUCOSE 109 (H) 07/26/2025    CALCIUM 9.6 07/26/2025     07/26/2025    K 4.2 07/26/2025    CO2 27 07/26/2025     07/26/2025    BUN 15 07/26/2025    CREATININE 1.06 07/26/2025     Lab Results   Component Value Date    ALT 17 07/26/2025    AST 12 07/26/2025    ALKPHOS 39 07/26/2025    BILITOT 0.6 07/26/2025     Lab Results   Component Value Date    WBC 11.0 07/26/2025    HGB 16.0 07/26/2025    HCT 46.9 07/26/2025    MCV 89 07/26/2025     07/26/2025     Lab Results   Component Value Date    CHOL 164 07/28/2025    CHOL 209 (H) 05/02/2025    CHOL 162 12/14/2024     Lab Results   Component Value Date    HDL 36.1 07/28/2025    HDL 89.0 05/02/2025    HDL 41.0 12/14/2024     Lab Results   Component Value Date    LDLCALC 109 (H) 07/28/2025    LDLCALC 111 (H) 05/02/2025    LDLCALC 105 (H) 12/14/2024     Lab Results   Component Value Date    TRIG 93 07/28/2025    TRIG 45 05/02/2025    TRIG 81 12/14/2024     Lab Results   Component Value Date    HGBA1C 6.3 (H) 12/14/2024     Other labs not included in the list above were reviewed either before or during this encounter.    History    Medical History[1]  Surgical History[2]  Family History[3]  Allergies[4]  Medications Ordered Prior to Encounter[5]  Immunization History   Administered Date(s) Administered    Flu vaccine (IIV4), preservative free *Check age/dose* 10/05/2020, 12/06/2023    Influenza, injectable, quadrivalent 11/14/2018    Influenza, seasonal, injectable 11/16/2012    Ariella SARS-CoV-2 Vaccination 06/06/2021    Tdap vaccine, age 7 year and older (BOOSTRIX, ADACEL) 08/10/2012     Patient's medical history was reviewed and updated either before or during this encounter.  ASSESSMENT / PLAN:  Active Hospital Problems    *Severe depressed bipolar I disorder without psychotic features (Multi)      Tobacco use disorder      Pure  hypercholesterolemia      Hypertension    Patient is being seen for medical management at [facility].  Patient was admitted to the hospital with suicidal ideations.  Patient's evaluation done in the ER reviewed.  Patient lab work including CBC and CMP reviewed which are normal.  Patient cholesterol level is stable.  Patient has hypertension and is on amlodipine.  Patient is being started on lithium so lisinopril is not being continued  Patient blood pressure will be monitored.      Judah Elder MD          [1]   Past Medical History:  Diagnosis Date    Fever 02/12/2025    History of laparoscopic appendectomy 02/12/2025   [2] History reviewed. No pertinent surgical history.  [3]   Family History  Problem Relation Name Age of Onset    Diabetes Mother      Diabetes Father      Hypertension Father      Stroke Maternal Grandfather     [4] No Known Allergies  [5]   No current facility-administered medications on file prior to encounter.     Current Outpatient Medications on File Prior to Encounter   Medication Sig Dispense Refill    amLODIPine (Norvasc) 5 mg tablet Take 1 tablet (5 mg) by mouth once daily. 90 tablet 1    folic acid (Folvite) 1 mg tablet Take 1 tablet (1 mg) by mouth once daily. 90 tablet 1    gabapentin (Neurontin) 300 mg capsule Take 1 capsule (300 mg) by mouth 3 times a day. 90 capsule 0    lisinopril 10 mg tablet Take 1 tablet (10 mg) by mouth once daily. 90 tablet 1    lurasidone (Latuda) 40 mg tablet Take 1 tablet (40 mg) by mouth once daily in the evening. Take with meals. 30 tablet 1    multivitamin with minerals tablet Take 1 tablet by mouth once daily. 30 tablet 11    naltrexone (Depade) 50 mg tablet Take 1 tablet (50 mg) by mouth once daily at bedtime. 90 tablet 1    propranolol (Inderal) 10 mg tablet Take 2 tablets (20 mg) by mouth 2 times a day. 120 tablet 0    tamsulosin (Flomax) 0.4 mg 24 hr capsule Take 1 capsule (0.4 mg) by mouth once daily. 90 capsule 1    nicotine (Nicoderm CQ) 14  mg/24 hr patch Place 1 patch over 24 hours on the skin once daily. 30 patch 0    nicotine polacrilex (Nicorette) 2 mg gum Chew 1 each (2 mg) every 2 hours if needed for smoking cessation (nicotine craving, urge to smoke). 100 each 0    thiamine (Vitamin B-1) 100 mg tablet Take 1 tablet (100 mg) by mouth once daily. (Patient not taking: Reported on 7/27/2025) 30 tablet 11    venlafaxine XR (Effexor-XR) 37.5 mg 24 hr capsule Take 1 capsule (37.5 mg) by mouth once daily. 7 capsule 0

## 2025-07-28 NOTE — GROUP NOTE
Group Topic: Other   Group Date: 7/28/2025  Start Time: 1335  End Time: 1430  Facilitators: BOSSMAN Robledo   Department: [facility]    Number of Participants: 11   Group Focus: other [employer] Game  Treatment Modality: Other: Recreation Therapy  Interventions utilized were exploration, group exercise, reminiscence, and story telling  Purpose: other: fun, elevate mood, increase socialization, enhance self esteem, stimulate memory    Name: Roman Ellis YOB: 1975   MR: [id number]      Facilitator: Recreational Therapist  Level of Participation: did not attend  Quality of Participation: did not attend  Interactions with others: did not attend  Mood/Affect: did not attend  Triggers (if applicable): n/a  Cognition: did not attend  Progress: None  Comments: pt problem is depressed mood.  Pt continues to refuse to attend group at this time.  No  handout to offer.  Plan: continue with services

## 2025-07-29 LAB
ATRIAL RATE: 47 BPM
P AXIS: 28 DEGREES
P OFFSET: 181 MS
P ONSET: 139 MS
PR INTERVAL: 152 MS
Q ONSET: 215 MS
QRS COUNT: 8 BEATS
QRS DURATION: 80 MS
QT INTERVAL: 444 MS
QTC CALCULATION(BAZETT): 392 MS
QTC FREDERICIA: 409 MS
R AXIS: 72 DEGREES
T AXIS: 56 DEGREES
T OFFSET: 437 MS
TSH SERPL-ACNC: 0.65 MIU/L (ref 0.44–3.98)
VENTRICULAR RATE: 47 BPM

## 2025-07-29 PROCEDURE — 1140000001 HC PRIVATE PSYCH ROOM DAILY

## 2025-07-29 PROCEDURE — 2500000001 HC RX 250 WO HCPCS SELF ADMINISTERED DRUGS (ALT 637 FOR MEDICARE OP)

## 2025-07-29 PROCEDURE — 84443 ASSAY THYROID STIM HORMONE: CPT

## 2025-07-29 PROCEDURE — 2500000002 HC RX 250 W HCPCS SELF ADMINISTERED DRUGS (ALT 637 FOR MEDICARE OP, ALT 636 FOR OP/ED): Performed by: INTERNAL MEDICINE

## 2025-07-29 PROCEDURE — 99232 SBSQ HOSP IP/OBS MODERATE 35: CPT

## 2025-07-29 PROCEDURE — 2500000002 HC RX 250 W HCPCS SELF ADMINISTERED DRUGS (ALT 637 FOR MEDICARE OP, ALT 636 FOR OP/ED)

## 2025-07-29 PROCEDURE — 2500000001 HC RX 250 WO HCPCS SELF ADMINISTERED DRUGS (ALT 637 FOR MEDICARE OP): Performed by: INTERNAL MEDICINE

## 2025-07-29 PROCEDURE — 36415 COLL VENOUS BLD VENIPUNCTURE: CPT

## 2025-07-29 RX ADMIN — TAMSULOSIN HYDROCHLORIDE 0.4 MG: 0.4 CAPSULE ORAL at 08:16

## 2025-07-29 RX ADMIN — LITHIUM CARBONATE 600 MG: 300 TABLET, EXTENDED RELEASE ORAL at 20:10

## 2025-07-29 RX ADMIN — AMLODIPINE BESYLATE 5 MG: 5 TABLET ORAL at 08:16

## 2025-07-29 RX ADMIN — SERTRALINE 25 MG: 25 TABLET, FILM COATED ORAL at 08:16

## 2025-07-29 RX ADMIN — FOLIC ACID 1 MG: 1 TABLET ORAL at 08:16

## 2025-07-29 ASSESSMENT — PAIN SCALES - GENERAL
PAINLEVEL_OUTOF10: 0 - NO PAIN
PAINLEVEL_OUTOF10: 0 - NO PAIN

## 2025-07-29 ASSESSMENT — PAIN - FUNCTIONAL ASSESSMENT: PAIN_FUNCTIONAL_ASSESSMENT: 0-10

## 2025-07-29 NOTE — GROUP NOTE
Group Topic: Medication Education   Group Date: 7/29/2025  Start Time: 1000  End Time: 1100  Facilitators: Mary Andrea PharmD   Department: [facility]    Number of Participants: 8   Group Focus: daily focus and other General Medication Education  Treatment Modality: Skills Training  Interventions utilized were group exercise, other [employer] Game, and patient education  Purpose: insight or knowledge and other: improved medication compliance     Name: Roman Ellis YOB: 1975   MR: [id number]      Facilitator: Pharmacist  Level of Participation: moderate  Quality of Participation: attentive, cooperative, and quiet  Interactions with others: appropriate  Mood/Affect: depressed  Triggers (if applicable): n/a  Cognition: coherent/clear  Progress: Gaining insight or knowledge  Comments: Roman Ellis attended the general medication education group today. We played [email or web address].  We went around the the room choosing categories and each time that Roman Ellis was called upon (it was his turn) he participated.   Roman Ellis was very quiet but seemed engaged in group by following along with the game and actively listening.     Plan: continue with services

## 2025-07-29 NOTE — CARE PLAN
The patient's goals for the shift include I will stay out of my room more today    The clinical goals for the shift include participate in group activities, come out of room    Over the shift, the patient did make progress toward the following goals.       Problem: Chronic Conditions and Co-morbidities  Goal: Patient's chronic conditions and co-morbidity symptoms are monitored and maintained or improved  Outcome: Progressing     Problem: Nutrition  Goal: Nutrient intake appropriate for maintaining nutritional needs  Outcome: Progressing

## 2025-07-29 NOTE — CARE PLAN
The patient's goals for the shift include to get rest    The clinical goals for the shift include To have no slips and or falls by end of shift    Recommendations to address these barriers include educate pt to wear non-slip footwear when up and ambulating around on unit to minimize risk of slips and falls while on unit.

## 2025-07-29 NOTE — PROGRESS NOTES
"oRman Ellis is a 50 y.o. male on day 2 of admission presenting with Severe depressed bipolar I disorder without psychotic features (Multi).      Subjective   Group therapy notes reviewed. Patient did not attend groups yesterday. No PRNs given.     Today, he is walking frequently around the unit, attending groups, and spending time in the room with his blinds open instead of closed. He states that he is \"tired of feeling this way\" when thinking about his depressive symptoms. He states that he is currently feeling anxious, saying it makes him feel jittery. Writer encouraged him to use hydroxyzine when anxious. He states that he is feeling a lot of guilt about whether he can help his family or be reliable for his family. He also states that he is worried he will have to take medications every day, and worried about what that means for him to have to take medications every day. He states that he is trying really hard to get up and get around but finds himself bored and unable to motivate himself to do interesting things.     Extensive discussion between patient and provider related to theorized pathology contributing to his current presentation.  Discussed likely having the genetic material for bipolar disorder and recent complication of increasing substance use as a trigger, or something else, to express bipolarity.  Patient reports understanding this concept.  We discussed with patient postacute withdrawal now presenting in the context of recent bobo with present bipolar depression.  We discussed with patient limited daily change expected and that we must stack accumulations and make comparisons not from day-to-day necessarily but from week to week or even greater expanses of time at this moment.  Patient reports understanding.  He reports he wants to feel better and expresses hopelessness and that he may not feel better.  He is eager for improvement.  We encouraged him to remain out of his room during the day as " "much is able to participate in the milieu and stay out of bed.  Following encounter, patient proceeded to the dining room where he was observed consuming the entire meal.  We further discussed with patient the question related to length of time medications will be indicated to treat his current condition.  Discussed with patient if he is tolerating the medications and persisting at a functional baseline, then medication reconsideration would be more ill-advised.  Goal is to prevent a similar recurrence and if it is thought eventually that medications help to pull him out of the current episode that continuing medications is more indicated.  We reviewed with patient that mental health conditions and mental disorders should be respected and treated as any other medical condition with examples being given including diabetes stroke heart attack etc.  Given an opportunity to ask questions or express other concerns, he had none to share with us today.  He continues to endorse feeling excessively depressed impairing his ability to meet the ordinary demands of daily life outside of a hospital setting.  He remains voluntarily admitted to the unit as such.         Objective     Last Recorded Vitals  Blood pressure 118/70, pulse 58, temperature 36.4 °C (97.5 °F), temperature source Temporal, resp. rate 16, height 1.753 m (5' 9\"), weight 79.4 kg (175 lb), SpO2 100%.    Sleep Log  Estimated \"Sleeping\" Durations   Night Est. Hours   07/27 11.00-12.00   07/28 10.00-10.25        Review of Systems    Psychiatric ROS - Adult  Anxiety: General Anxiety Disorder (ASHLEE)ASHLEE Behaviors: difficult to control worry, excessive anxiety/worry, easily fatigued, and irritability  Depression: anhedonia, energy, guilt, and hopeless  Delirium: negative  Psychosis: negative  Pearl: negative  Safety Issues: denies SI  Psychiatric ROS Comment: endorses significant guilt and depressive symptoms    Physical Exam  Constitutional:       General: He is not in " "acute distress.  HENT:      Right Ear: External ear normal.      Left Ear: External ear normal.      Nose: Nose normal.     Eyes:      Conjunctiva/sclera: Conjunctivae normal.     Pulmonary:      Effort: Pulmonary effort is normal.     Neurological:      General: No focal deficit present.           Mental Status Exam:  Appearance: laying in bed with cover over him. Well groomed with beard.   Attitude/Behavior: Cooperative and frustrated at times when talking about his current depression, not violent  Motor Activity: No psychomotor agitation or slowing. No abnormal movements, tremors or tics  Speech: coherent speech and expected rate, soft speech at times  Mood: \"I just want to feel like myself\" and depressed  Affect: Dysphoric, constricted but reactive and Congruent with mood and topic of conversation  Thought Process: Linear, goal directed  Thought Content: Mood congruent, Negative self-talk, and concern for minimizing recent suicidality as expressed  Perception: Does not appear to be internally stimulated, denies avh when asked  Cognition: Alert and oriented x4 to person, place, time, and situation  Insight: Fair, in regards to understanding mental health condition, expressing frustration about slow progress  Judgement: Fair, limited in worrying that he might need the medications for the rest of his life  Reliability of Information Provided: consistent with documentation      Psychiatric Risk Assessment  Violence Risk Assessment: acces to weapons, major mental illness, male,  history or weapons training, presence of firearms in home, substance abuse, and unemployment  Acute Risk of Harm to Others is Considered: low   Suicide Risk Assessment: access to weapons, , current psychiatric illness, feelings of hopelessness, history of trauma or abuse, male, presence of firearms in home, severe anxiety, substance abuse, and suicidal ideations  Protective Factors against Suicide: adherence to  treatment, " child-related concerns/living with children at home < 18 yrs, marriage/partnership, and positive family relationships  Acute Risk of Harm to Self is Considered: moderate    Relevant Results  TSH within normal limits     Assessment & Plan  Pure hypercholesterolemia    Hypertension    Tobacco use disorder    Severe depressed bipolar I disorder without psychotic features (Multi)       A:  Bipolar depression f31.4     Rmoan Ellis is a 50 y.o. male with a past medical history of recently diagnosed bipolar disorder, past history of anxiety, depression, and substance use disorder here for SI and worsening depressive symptoms.      He has has multiple recent admissions complicated by drug and alcohol use, however most recently with a diagnosis of bipolar disorder. Per patient, he has been feeling increasingly depressed over the past month, much worse over the past two weeks. It is likely that his current clinical picture is an acute depressive episode in the context of his bipolar disorder. He will require medication adjustments to best address his current clinical picture. He otherwise denies current suicidality, but exhibited concerning symptoms for suicidality outpatient and remains at moderate to high risk of suicide outpatient without additional treatment. He requires inpatient admission for medication adjustments and close clinical monitoring.      SBIRT - Patient was screened for substances of abuse and had a positive screen.  Brief intervention then held with patient discussing the impact of substance abuse/use disorder on bio/psycho/social functioning and patient's readiness for change.  On discharge, patient will be referred to treatment for substance use disorder.     7/29- continues to endorse significant depression and anxiety, has worries for the future and whether he will have ability to function in his daily life after this. However, he is able to attend groups and is more participatory today compared to  yesterday.      Plan  Biological -      - sertraline 25 mg, titrate as tolerated to effect for depression (likely tageting 100-150 mg during this admission)  - lithium 600 mg ER, titrate as tolerated for mood disorder   Monitoring for lithium:   - Lithium level due Thursday morning - targeting ~0.8 - not yet ordered  - Monitored renal function, thyroid function, reviewed EKG of may 19 2025 - no conduction anomaly noted  - Lisinopril dc'd by medical due to lithium use - monitor bp     Literature supports lithium combination therapy - utilizing ssri as combo agent in lieu of antipsychotic seroquel but may add seroquel adjunctively depending on response to lithium + zoloft.  Avoiding previously used snri effexor to minimize potential for switching.  Consider ECT - not available in this location.        2. Psychological -      Patient is encouraged to participate with the therapeutic milieu and program and group therapy        3. Sociological -      Patient encouraged to cooperate with social work staff on issues relevant to discharge planning     4. Patient discussion -      Plan discussed with patient who concurs with the assessment, treatment plan, and with the following informed consent for medication:   The benefits, risks, most common side effects, possible consequences of not taking the medication, and alternatives to the recommended treatment has been discussed at length.             Goals for discharge include:  Psychiatric condition: to lower acute risk, return to baseline level of functioning, work to identify positive coping skills to manage psychiatric symptoms, allow for reconciliation of medications  Physical condition: increase daily physical activity, demonstrate interest in completing daily activity, and complete Activities of Daily Living  Social function: identify protective factors and family supports, increase social interactions on the unit with peers and providing staff, and demonstrate  appropriate problem solving skills for engaging after care on discharge        Medication Consent  Medication Consent: risks, benefits, side effects reviewed for all ordered meds                     Navya Bird MD

## 2025-07-29 NOTE — NURSING NOTE
VANNESSA NOTE     Problem:  Depression     Behavior:  This patient has a flat affect.  He stated that his mood is better today than yesterday.  He also stated he did not sleep well because he slept so much yesterday during the day.  The patient stated he is going to try and come out of his room more this shift.  Group Participation: yes  Appetite/Meals: good       Interventions:  Encourage pt to go to group activities  Every 15 minute checks  Give scheduled medications      Response:  Pt walked the hallways this shift and he went to group activities.       Plan:  Encourage pt to go to group activities  Every 15 minute checks  Give scheduled medications

## 2025-07-29 NOTE — NURSING NOTE
BIRP NOTE     Problem:  Depression      Behavior:  Pt presents calm at time of assessment. Pt Denies Auditory and Visual , and Denies suicidal ideation with plan, and voices no homicidal ideation. Pt voices 0/10 pain at this time during assessment. Pt educated on medications and has no other new concerns. Pt compliant with medication at this time. Pt has not been given PRN medications. Pt has no other new concerns with nutritional needs at this time. Pt encouraged to attend unit programing throughout shift to and to adopt positive coping skills. Pt also encouraged to wear slip resistance foot wear to reduce and minimize falls while ambulating around the hospital unit. Pt encouraged to be social while on unit with other peers.     Group Participation: No   Appetite/Meals: Fair    [unfilled]      Interventions:  Pt. Was offered groups and their scheduled medications per MAR orders.    PRN Medications:  N/A     Response:  Pt. Has been compliant w/ meds, tolerated them well; and did not attend the majority of group sessions w/ good results.     Plan:  Pt. will continue to be monitored Q 15 minutes  for changes in mental status & safety on the unit.    Ele De La O RN  7/28/2025

## 2025-07-29 NOTE — GROUP NOTE
Group Topic: Goals   Group Date: 7/28/2025  Start Time: 2000  End Time: 2014  Facilitators: Valeria Boone   Department: [facility]     Number of Participants: 11   Group Focus: goals  Treatment Modality: Other: PCA  Interventions utilized were reminiscence  Purpose: feelings and communication skills    Name: Roman Ellis YOB: 1975   MR: [id number]      Facilitator: [facility]  Level of Participation: did not attend  Quality of Participation: did not attend  Interactions with others: did not attend  Mood/Affect: did not attend  Triggers (if applicable): N/A  Cognition: did not attend  Progress: Other  Comments: Pt problem is depression. Pt declined the invitation to attend group.   Plan: continue with services

## 2025-07-29 NOTE — GROUP NOTE
Group Topic: Goals   Group Date: 7/29/2025  Start Time: 0730  End Time: 0800  Facilitators: Denisse Castillo   Department: [facility] Geriatric     Number of Participants: 11   Group Focus: goals  Treatment Modality: Other: Goal setting  Interventions utilized were assignment  Purpose: other: Daily goal setting    Name: Roman Ellis YOB: 1975   MR: [id number]      Facilitator: [facility]  Level of Participation: minimal  Quality of Participation: appropriate/pleasant  Interactions with others: appropriate  Mood/Affect: appropriate  Cognition: coherent/clear  Progress: Minimal  Comments: patient made minimal progress towards treatment of bipolar depression  Plan: changes to treatment plan and patient will be encouraged to participate

## 2025-07-29 NOTE — GROUP NOTE
Group Topic: Other   Group Date: 7/29/2025  Start Time: 1330  End Time: 1430  Facilitators: BOSSMAN Robledo   Department: [facility] VIRTUAL    Number of Participants: 9   Group Focus: other Mind Joggers  Treatment Modality: Other: Recreation Therapy  Interventions utilized were group exercise and reminiscence  Purpose: other: fun, elevate mood, increase socialization, enhance self esteem, stimulate memory    Name: Roman Ellis YOB: 1975   MR: [id number]      Facilitator: Recreational Therapist  Level of Participation: did not attend  Quality of Participation: did not attend  Interactions with others: did not attend  Mood/Affect: did not attend  Triggers (if applicable): n/a  Cognition: did not attend  Progress: None  Comments: pt problem is depressed mood.  Pt is resting in his room.  No handout to offer at this time.    Plan: continue with services

## 2025-07-29 NOTE — GROUP NOTE
Group Topic: Stress Reduction/Relaxation   Group Date: 7/29/2025  Start Time: 1105  End Time: 1130  Facilitators: BOSSMAN Robledo   Department: [facility] VIRT[facility]    Number of Participants: 9   Group Focus: other [employer]  Treatment Modality: Other: Recreation Therapy  Interventions utilized were exploration, group exercise, patient education, problem solving, and support  Purpose: coping skills, maladaptive thinking, feelings, irrational fears, communication skills, insight or knowledge, self-worth, self-care, relapse prevention strategies, and trigger / craving management    Name: Roman Ellis YOB: 1975   MR: [id number]      Facilitator: Recreational Therapist  Level of Participation: minimal  Quality of Participation: passive, quiet, and withdrawn  Interactions with others: passive  Mood/Affect: flat  Triggers (if applicable): n/a  Cognition: passive  Progress: Minimal  Comments: pt problem is depressed mood.  Pt joins group with little encouragement.  Displays passive participation and makes some eye contact during session.    Plan: continue with services

## 2025-07-30 PROCEDURE — 99232 SBSQ HOSP IP/OBS MODERATE 35: CPT | Performed by: PSYCHIATRY & NEUROLOGY

## 2025-07-30 PROCEDURE — 1140000001 HC PRIVATE PSYCH ROOM DAILY

## 2025-07-30 PROCEDURE — 2500000001 HC RX 250 WO HCPCS SELF ADMINISTERED DRUGS (ALT 637 FOR MEDICARE OP): Performed by: INTERNAL MEDICINE

## 2025-07-30 PROCEDURE — 2500000001 HC RX 250 WO HCPCS SELF ADMINISTERED DRUGS (ALT 637 FOR MEDICARE OP): Performed by: STUDENT IN AN ORGANIZED HEALTH CARE EDUCATION/TRAINING PROGRAM

## 2025-07-30 PROCEDURE — 2500000002 HC RX 250 W HCPCS SELF ADMINISTERED DRUGS (ALT 637 FOR MEDICARE OP, ALT 636 FOR OP/ED)

## 2025-07-30 PROCEDURE — 2500000001 HC RX 250 WO HCPCS SELF ADMINISTERED DRUGS (ALT 637 FOR MEDICARE OP)

## 2025-07-30 PROCEDURE — 2500000002 HC RX 250 W HCPCS SELF ADMINISTERED DRUGS (ALT 637 FOR MEDICARE OP, ALT 636 FOR OP/ED): Performed by: INTERNAL MEDICINE

## 2025-07-30 PROCEDURE — 99232 SBSQ HOSP IP/OBS MODERATE 35: CPT | Performed by: INTERNAL MEDICINE

## 2025-07-30 RX ORDER — SERTRALINE HYDROCHLORIDE 50 MG/1
50 TABLET, FILM COATED ORAL DAILY
Status: DISCONTINUED | OUTPATIENT
Start: 2025-07-31 | End: 2025-08-04

## 2025-07-30 RX ADMIN — HYDROXYZINE HYDROCHLORIDE 25 MG: 25 TABLET, FILM COATED ORAL at 11:17

## 2025-07-30 RX ADMIN — LITHIUM CARBONATE 600 MG: 300 TABLET, EXTENDED RELEASE ORAL at 20:28

## 2025-07-30 RX ADMIN — SERTRALINE 25 MG: 25 TABLET, FILM COATED ORAL at 08:11

## 2025-07-30 RX ADMIN — FOLIC ACID 1 MG: 1 TABLET ORAL at 08:10

## 2025-07-30 RX ADMIN — TAMSULOSIN HYDROCHLORIDE 0.4 MG: 0.4 CAPSULE ORAL at 08:11

## 2025-07-30 RX ADMIN — AMLODIPINE BESYLATE 5 MG: 5 TABLET ORAL at 08:11

## 2025-07-30 ASSESSMENT — PAIN - FUNCTIONAL ASSESSMENT: PAIN_FUNCTIONAL_ASSESSMENT: 0-10

## 2025-07-30 ASSESSMENT — PAIN SCALES - GENERAL: PAINLEVEL_OUTOF10: 0 - NO PAIN

## 2025-07-30 NOTE — PROGRESS NOTES
LSW and provider met with pt's wife while on unit for visitation. Pt's wife is very concerned as pt is presenting still very depressed. Provider discussed medication regimen and treatment plan which includes time. Pt likely to be hospitalized for at least a week. LSW reported that pt did attend groups with some participation today. Pt's wife reports that she will have the guns removed from the home this evening. Outpatient services to include pt establishing care with Dr. Thornton as appointment is scheduled for 8/22/25. Pt is currently receiving therapy at [facility]. LSW will inquire with  about pt obtaining therapy services for continuity of care. Pt's wife is concerned about pt returning to baseline, which is active and social. LSW and provider endorsed that it will take time as well as proper follow up for pt to stabilize but is possible. LSW provide support and encouragement to pt's wife.     NIXON Ross LSW    romiplostim 65mcg sq given today  Pre-Injection Assessment: Vital signs entered. Pt denies blood in urine, stool, sputum. Pt states bruising is stable. Pt does report a short episode of L chest wall pain over heart x 2. Pt states one occurred approx 1 week ago, the second last night. Pt denies increased SOB, palpitations, numbness tingling in arms or back, light-headedness/dizziness. Dr. Adame notified. MD has no new orders at this time. Pt to notify office if symptoms continue and/or go to ED for worsening symptoms that do not resolve. Pt's platelet count today is 62,000. Orders to give 1mcg/kg today. Authorization completed if applicable.      Refer to MAR (medication administration record) for type of injection and medication given.  Needle Size: 25 g. 5/8\"  Patient tolerated well: Stable and Follow up appointment scheduled     Dr Adame is supervising provider today.

## 2025-07-30 NOTE — GROUP NOTE
Group Topic: Music Therapy   Group Date: 7/30/2025  Start Time: 1100  End Time: 1155  Facilitators: mAy Garcia   Department: [facility] EXPRESSIVE THER VIRTUAL    Number of Participants: 7   Group Focus: daily focus, leisure skills, and self-awareness  Treatment Modality: Music Therapy  Interventions Utilized were: other musical game, passive music engagement, and sharing/discussion    Pts played musical intro bingo, where they had to identify songs only by their intro. This helped pts focus on task at hand, and later led to some sharing/ discussion as the music brought up memories.   Name: Roman Ellis YOB: 1975   MR: [id number]        Level of Participation: moderate  Quality of Participation: appropriate/pleasant and withdrawn  Interactions with others: appropriate  Mood/Affect: depressed and flat  Cognition, Pre Treatment: attentive  Cognition, Post Treatment: attentive  Progress: Moderate  Plan: continue with services    Pt appeared depressed throughout, did not initiate conversation, but did quietly participate.

## 2025-07-30 NOTE — GROUP NOTE
Group Topic: Stress Reduction/Relaxation   Group Date: 7/30/2025  Start Time: 1000  End Time: 1050  Facilitators: Dioni CHAN CTRS   Department: [facility] REHABTH VIRTUAL    Number of Participants: 7   Group Focus: mindfulness, relaxation, and self-awareness  Treatment Modality: Dialectical Behavioral Therapy, Skills Training, and Other: Recreation Therapy   Interventions utilized were exploration, group exercise, patient education, and support  Purpose: In this therapeutic group patients engaged in mindfulness and relaxation skills including chair yoga exercise/stretching which is utilized to assist in decreasing stress and improve overall mental health. Pt also participated in deep breathing techniques which aims to promote relaxation, self-awareness and helps to decrease stress and anxiety.     Name: Roman Ellis YOB: 1975   MR: [id number]      Facilitator: Recreational Therapist  Level of Participation: active  Quality of Participation: appropriate/pleasant, cooperative, engaged, and quiet  Interactions with others: appropriate  Mood/Affect: flat  Cognition: coherent/clear  Progress: Moderate  Comments: pt joins group with little encouragement needed. Follows along with demonstrated relaxation techniques appropriately. Quiet, and displays flat affect with mostly poor eye contact.    Plan: continue with services

## 2025-07-30 NOTE — GROUP NOTE
Group Topic: Other   Group Date: 7/30/2025  Start Time: 1330  End Time: 1430  Facilitators: PHILLIP FinneyS   Department: [facility]    Number of Participants: 5   Group Focus: concentration, leisure skills, self-esteem, and social skills  Treatment Modality: Leisure Development and Other: Recreation Therapy  Interventions utilized were group exercise, leisure development, mental fitness, and support  Purpose: Patients engaged in a therapeutic group game of “[email or web address]”. Patients take turns creating new four letter words only changing one letter per turn.  This game aims to enhance cognition by ways of concentration and memory stimulation. Also, promotes social stimulation, enhanced mood and following directions.     Name: Roman Ellis YOB: 1975   MR: [id number]      Facilitator: Recreational Therapist  Level of Participation: active  Quality of Participation: appropriate/pleasant, attentive, cooperative, and engaged  Interactions with others: appropriate  Mood/Affect: flat  Cognition: coherent/clear  Progress: Moderate  Comments: Engages easily with staff and peers. Appropriate and cooperative interactions. Displays good attention to tasks.   Plan: continue with services

## 2025-07-30 NOTE — PROGRESS NOTES
[facility]: [facility]  PROGRESS NOTE      Roman Ellis is a 50 y.o. male that is being seen  today for follow-up [facility]..  Subjective   Patient is being seen for follow-up at [facility].  Patient is on amlodipine and blood pressure has been fairly controlled.  Patient also is on Flomax and denies any urinary complaints.  Patient has been participating in activities.  Denies any suicidal ideations.      ROS  Negative for fever or chills  Negative for sore throat, ear pain, nasal discharge  Negative for cough, shortness of breath or wheezing  Negative for chest pain, palpitations, swelling of legs  Negative for abdominal pain, constipation, diarrhea, blood in the stools  Negative for urinary complaints  Negative for headache, dizziness, weakness or numbness  Negative for joint pain  Positive for depression or anxiety  All other systems reviewed and were negative   Vitals:    07/30/25 0744   BP: 133/81   Pulse: 51   Resp: 17   Temp: 36.8 °C (98.2 °F)   SpO2: 96%      Vitals:    07/27/25 1357   Weight: 79.4 kg (175 lb)     Body mass index is 25.84 kg/m².  Physical Exam  Constitutional: Patient does not appear to be in any acute distress  Head and Face: NCAT  Eyes: Normal external exam, EOMI  ENT: Normal external inspection of ears and nose. Oropharynx normal.  Cardiovascular: RRR, S1/S2, no murmurs, rubs, or gallops, radial pulses +2, no edema of extremities  Pulmonary: CTAB, no respiratory distress.  Abdomen: +BS, soft, non-tender, nondistended, no guarding or rebound, no masses noted  MSK: No joint swelling, normal movements of all extremities. Range of motion- normal.  Skin- No lesions, contusions, or erythema.  Peripheral puslses palpable bilaterally 2+  Neuro: AAO X3, Cranial nerves 2-12 grossly intact,DTR 2+ in all 4 limbs       LABS   [unfilled]  Lab Results   Component Value Date    GLUCOSE 109 (H) 07/26/2025    CALCIUM 9.6 07/26/2025     07/26/2025    K 4.2 07/26/2025    CO2 27  07/26/2025     07/26/2025    BUN 15 07/26/2025    CREATININE 1.06 07/26/2025     Lab Results   Component Value Date    ALT 17 07/26/2025    AST 12 07/26/2025    ALKPHOS 39 07/26/2025    BILITOT 0.6 07/26/2025     Lab Results   Component Value Date    WBC 11.0 07/26/2025    HGB 16.0 07/26/2025    HCT 46.9 07/26/2025    MCV 89 07/26/2025     07/26/2025     Lab Results   Component Value Date    CHOL 164 07/28/2025    CHOL 209 (H) 05/02/2025    CHOL 162 12/14/2024     Lab Results   Component Value Date    HDL 36.1 07/28/2025    HDL 89.0 05/02/2025    HDL 41.0 12/14/2024     Lab Results   Component Value Date    LDLCALC 109 (H) 07/28/2025    LDLCALC 111 (H) 05/02/2025    LDLCALC 105 (H) 12/14/2024     Lab Results   Component Value Date    TRIG 93 07/28/2025    TRIG 45 05/02/2025    TRIG 81 12/14/2024     Lab Results   Component Value Date    HGBA1C 6.3 (H) 12/14/2024     Other labs not included in the list above were reviewed either before or during this encounter.    History    Medical History[1]  Surgical History[2]  Family History[3]  Allergies[4]  Medications Ordered Prior to Encounter[5]  Immunization History   Administered Date(s) Administered    Flu vaccine (IIV4), preservative free *Check age/dose* 10/05/2020, 12/06/2023    Influenza, injectable, quadrivalent 11/14/2018    Influenza, seasonal, injectable 11/16/2012    Ariella SARS-CoV-2 Vaccination 06/06/2021    Tdap vaccine, age 7 year and older (BOOSTRIX, ADACEL) 08/10/2012     Patient's medical history was reviewed and updated either before or during this encounter.  ASSESSMENT / PLAN:  Active Hospital Problems    *Severe depressed bipolar I disorder without psychotic features (Multi)      Tobacco use disorder      Pure hypercholesterolemia      Hypertension       Patient is being seen for follow-up visit at [facility].  Patient has history of hypertension and is on amlodipine and blood pressure has been fairly controlled.  Will continue with  amlodipine.  Patient has been active smoker.  Patient is on nicotine patch.  Patient also has history of benign prostate hyperplasia and is on tamsulosin and symptoms have been well-controlled.  Will continue with the same medications.      Judah Elder MD          [1]   Past Medical History:  Diagnosis Date    Fever 02/12/2025    History of laparoscopic appendectomy 02/12/2025   [2] History reviewed. No pertinent surgical history.  [3]   Family History  Problem Relation Name Age of Onset    Diabetes Mother      Diabetes Father      Hypertension Father      Stroke Maternal Grandfather     [4] No Known Allergies  [5]   No current facility-administered medications on file prior to encounter.     Current Outpatient Medications on File Prior to Encounter   Medication Sig Dispense Refill    amLODIPine (Norvasc) 5 mg tablet Take 1 tablet (5 mg) by mouth once daily. 90 tablet 1    folic acid (Folvite) 1 mg tablet Take 1 tablet (1 mg) by mouth once daily. 90 tablet 1    gabapentin (Neurontin) 300 mg capsule Take 1 capsule (300 mg) by mouth 3 times a day. 90 capsule 0    lisinopril 10 mg tablet Take 1 tablet (10 mg) by mouth once daily. 90 tablet 1    lurasidone (Latuda) 40 mg tablet Take 1 tablet (40 mg) by mouth once daily in the evening. Take with meals. 30 tablet 1    multivitamin with minerals tablet Take 1 tablet by mouth once daily. 30 tablet 11    naltrexone (Depade) 50 mg tablet Take 1 tablet (50 mg) by mouth once daily at bedtime. 90 tablet 1    propranolol (Inderal) 10 mg tablet Take 2 tablets (20 mg) by mouth 2 times a day. 120 tablet 0    tamsulosin (Flomax) 0.4 mg 24 hr capsule Take 1 capsule (0.4 mg) by mouth once daily. 90 capsule 1    nicotine (Nicoderm CQ) 14 mg/24 hr patch Place 1 patch over 24 hours on the skin once daily. 30 patch 0    nicotine polacrilex (Nicorette) 2 mg gum Chew 1 each (2 mg) every 2 hours if needed for smoking cessation (nicotine craving, urge to smoke). 100 each 0    thiamine  (Vitamin B-1) 100 mg tablet Take 1 tablet (100 mg) by mouth once daily. (Patient not taking: Reported on 7/27/2025) 30 tablet 11    venlafaxine XR (Effexor-XR) 37.5 mg 24 hr capsule Take 1 capsule (37.5 mg) by mouth once daily. 7 capsule 0

## 2025-07-30 NOTE — GROUP NOTE
Group Topic: Goals   Group Date: 7/30/2025  Start Time: 0730  End Time: 0800  Facilitators: Denisse Castillo   Department: [facility] Geriatric     Number of Participants: 7   Group Focus: goals  Treatment Modality: Other: Daily check in and goal setting  Interventions utilized were assignment  Purpose: other: Morning check in and goal setting    Name: Roman Ellis YOB: 1975   MR: [id number]      Facilitator: Mental Health PCNA  Level of Participation: active  Quality of Participation: appropriate/pleasant, attentive, cooperative, and engaged  Interactions with others: appropriate  Mood/Affect: appropriate  Cognition: coherent/clear  Progress: Moderate  Comments: Patient problem is bipolar. Patient active and engaged during goals group.  Plan: continue with services

## 2025-07-30 NOTE — CARE PLAN
The patient's goals for the shift include I will stay out of my room more today    The clinical goals for the shift include maintain safety    Over the shift, the patient did make progress toward the following goals.

## 2025-07-30 NOTE — CARE PLAN
The patient's goals for the shift include Go home    The clinical goals for the shift include maintain safety    Over the shift, the patient did make progress toward the following goals.     Problem: Chronic Conditions and Co-morbidities  Goal: Patient's chronic conditions and co-morbidity symptoms are monitored and maintained or improved  Outcome: Progressing

## 2025-07-30 NOTE — NURSING NOTE
Pt requested PRN ativan for anxiety, pt concerned about LOS. Atarax given at 1117. Comfort and safety maintained

## 2025-07-30 NOTE — NURSING NOTE
VANNESSA NOTE     Problem:  Depression     Behavior:  Patient was quiet, calm and isolative to his room. He did state that his day was ok and that it was better than yesterday. Patient refused to come out of his room.    Appetite/Meals: HS snacks refused       Interventions:  Administered scheduled medications    Response:  Complaint with care and medications     Plan:  Continue current plan of care

## 2025-07-30 NOTE — PROGRESS NOTES
"Roman Ellis is a 50 y.o. male on day 3 of admission presenting with Severe depressed bipolar I disorder without psychotic features (Multi).        Subjective   Chart reviewed and case discussed with multidisciplinary treatment team.    Group therapy notes reviewed. Pt attended group this morning and reports plans on attending group sessions later throughout the day.     Today, he is continuing to walk around frequently. When asked how he's feeling, he states, \"I feel slightly better in bursts,\" but for the most part, he doesn't feel too different from admission.  When asked about the events leading up to his admission, he states that he used marijuana back in May twice which led to his manic symptoms. He did not notice the symptoms but his wife noticed hyperactivity and inability to sleep. Since then, he was started on Abilify and Latuda which he claims worsened his depression. 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  He reports being due to start a new position in [employer] next Monday which he is anxious about. He is also anxious about being away from his wife and kids. He talks to his wife everyday and states \"she is worried that he'll never be the same again.\" When asked about his hospitalization and what he thinks about the treatment, he states that he has \"no choice\" but \"hopeful\" that it will work. He reiterated multiple times throughout the encounter that he feels empty. Meals and sleeping feel like a chore rather than something enjoyable or relaxing. Outside of the hospital his hobbies include football, music, and reading but states he can't get himself to read due to lack of motivation. Going to group doesn't \"do anything\" for him. He doesn't endorse any anxiety interacting with other people but doesn't find any of the activities helpful. He doesn't have any intrusive thoughts from his past.    Has produced no all improvement " "thus far.  Continues to function well below his typical functional status in the community.  Patient and wife both remain in agreement the patient is not able to meet ordinary demands of daily life outside of a hospital setting at this time.  Continues to demonstrate anhedonia alexithymia increased guilt low energy endorses feeling depressed hopeless and helpless.    Wife came to the hospital today to visit and also met with  and provider separately.  She reports concern related to the incident leading to admission where she heard him entering the gun safe and recognized immediately that the sound of the buttons being pushed on the safe or indicative that he was attempting to obtain firearms.  Firearms are being removed from the home today.  Wife was tearful reported grave concern for patient's condition and that he may not return to his premorbid level of functioning.  She also continues to report that he is not able to function outside of a hospital at this time as he is also limiting his demonstration of ability to function even inside of a hospital at this time.        Objective   Vital signs in last 24 hours:  Temp:  [36.6 °C (97.9 °F)-36.8 °C (98.2 °F)] 36.8 °C (98.2 °F)  Heart Rate:  [51-52] 51  Resp:  [16-17] 17  BP: (120-133)/(62-81) 133/81    Sleep Log  Estimated \"Sleeping\" Durations   Night Est. Hours   07/27 11.00-12.00   07/28 10.00-10.25   07/29 10.50-11.25      Physical Exam  Constitutional:       General: He is not in acute distress.  HENT:      Right Ear: External ear normal.      Left Ear: External ear normal.      Nose: Nose normal.      Eyes:      Conjunctiva/sclera: Conjunctivae normal.      Pulmonary:      Effort: Pulmonary effort is normal.      Neurological:      General: No focal deficit present.       Mental Status Exam:  Appearance: laying in bed. Well groomed with beard.   Attitude/Behavior: Cooperative and frustrated at times when talking about his current depression, not " "violent  Motor Activity: No psychomotor agitation. + slowing. No abnormal movements, tremors or tics  Speech: coherent speech and expected rate, soft speech at times  Mood: \"I just feel empty\" and depressed  Affect: Dysphoric, constricted but reactive and Congruent with mood and topic of conversation  Thought Process: Linear, goal directed  Thought Content: Mood congruent, Negative self-talk, and concern for minimizing recent suicidality as expressed  Perception: Does not appear to be internally stimulated, denies avh when asked  Cognition: Alert and oriented x4 to person, place, time, and situation  Insight: Fair, in regards to understanding mental health condition, expressing frustration about slow progress  Judgement: Fair, limited in worrying that he might be the same person anymore but hopes the medications will work.   Reliability of Information Provided: consistent with documentation    Results for orders placed or performed during the hospital encounter of 07/27/25 (from the past 96 hours)   Glucose, Fasting   Result Value Ref Range    Glucose, Fasting 122 (H) 74 - 99 mg/dL   Lipid Panel   Result Value Ref Range    Cholesterol 164 0 - 199 mg/dL    HDL-Cholesterol 36.1 mg/dL    Cholesterol/HDL Ratio 4.5     LDL Calculated 109 (H) <=99 mg/dL    VLDL 19 0 - 40 mg/dL    Triglycerides 93 0 - 149 mg/dL    Non HDL Cholesterol 128 0 - 149 mg/dL   TSH with reflex to Free T4 if abnormal   Result Value Ref Range    Thyroid Stimulating Hormone 0.65 0.44 - 3.98 mIU/L       Assessment/Plan     Assessment & Plan  Pure hypercholesterolemia    Hypertension    Tobacco use disorder    Severe depressed bipolar I disorder without psychotic features (Multi)    A:  Bipolar depression f31.4     Roman Ellis is a 50 y.o. male with a past medical history of recently diagnosed bipolar disorder, past history of anxiety, depression, and substance use disorder here for SI and worsening depressive symptoms.      He has has multiple " recent admissions complicated by drug and alcohol use, however most recently with a diagnosis of bipolar disorder. Per patient, he has been feeling increasingly depressed over the past month, much worse over the past two weeks. It is likely that his current clinical picture is an acute depressive episode in the context of his bipolar disorder. He will require medication adjustments to best address his current clinical picture. He otherwise denies current suicidality, but exhibited concerning symptoms for suicidality outpatient and remains at moderate to high risk of suicide outpatient without additional treatment. He requires inpatient admission for medication adjustments and close clinical monitoring.      SBIRT - Patient was screened for substances of abuse and had a positive screen.  Brief intervention then held with patient discussing the impact of substance abuse/use disorder on bio/psycho/social functioning and patient's readiness for change.  On discharge, patient will be referred to treatment for substance use disorder.      7/29- continues to endorse significant depression and anxiety, has worries for the future and whether he will have ability to function in his daily life after this. However, he is able to attend groups and is more participatory today compared to yesterday.     7/30- states mood is better in bursts but feels empty for the most of the day. Reported improvement is not objectively observed.  He attends groups but states that they are not helping him. He is anxious to return to his life and start his new job.  Persisting similar to as on arrival lacking robust or even minimal change in presentation.     Plan  Biological -      - titrate zoloft to 50 mg daily tomorrow for depression - further titration as tolerated, beneficial  - lithium 600 mg ER, titrate as tolerated for mood disorder              Monitoring for lithium:              - Lithium level ordered for Thursday morning - targeting  ~0.8  - Monitored renal function, thyroid function, reviewed EKG of may 19 2025 - no conduction anomaly noted  -consider addition of seroquel once lithium at steady state as mood adjunct     Literature supports lithium combination therapy - utilizing ssri as combo agent in lieu of antipsychotic seroquel but may add seroquel adjunctively depending on response to lithium + zoloft.  Avoiding previously used snri effexor to minimize potential for switching.  Consider ECT - not available in this location.        2. Psychological -      Patient is encouraged to participate with the therapeutic milieu and program and group therapy        3. Sociological -      Patient encouraged to cooperate with social work staff on issues relevant to discharge planning     4. Patient discussion -      Plan discussed with patient who concurs with the assessment, treatment plan, and with the following informed consent for medication:   The benefits, risks, most common side effects, possible consequences of not taking the medication, and alternatives to the recommended treatment has been discussed at length.             Goals for discharge include:  Psychiatric condition: to lower acute risk, return to baseline level of functioning, work to identify positive coping skills to manage psychiatric symptoms, allow for reconciliation of medications  Physical condition: increase daily physical activity, demonstrate interest in completing daily activity, and complete Activities of Daily Living  Social function: identify protective factors and family supports, increase social interactions on the unit with peers and providing staff, and demonstrate appropriate problem solving skills for engaging after care on discharge        Medication Consent  Medication Consent: risks, benefits, side effects reviewed for all ordered meds     Brad Barros

## 2025-07-31 LAB — LITHIUM SERPL-SCNC: 0.56 MMOL/L (ref 0.6–1.2)

## 2025-07-31 PROCEDURE — 2500000002 HC RX 250 W HCPCS SELF ADMINISTERED DRUGS (ALT 637 FOR MEDICARE OP, ALT 636 FOR OP/ED): Performed by: INTERNAL MEDICINE

## 2025-07-31 PROCEDURE — 2500000001 HC RX 250 WO HCPCS SELF ADMINISTERED DRUGS (ALT 637 FOR MEDICARE OP): Performed by: INTERNAL MEDICINE

## 2025-07-31 PROCEDURE — 36415 COLL VENOUS BLD VENIPUNCTURE: CPT | Performed by: PSYCHIATRY & NEUROLOGY

## 2025-07-31 PROCEDURE — 2500000002 HC RX 250 W HCPCS SELF ADMINISTERED DRUGS (ALT 637 FOR MEDICARE OP, ALT 636 FOR OP/ED): Performed by: PSYCHIATRY & NEUROLOGY

## 2025-07-31 PROCEDURE — 80178 ASSAY OF LITHIUM: CPT | Performed by: PSYCHIATRY & NEUROLOGY

## 2025-07-31 PROCEDURE — 1140000001 HC PRIVATE PSYCH ROOM DAILY

## 2025-07-31 PROCEDURE — 99232 SBSQ HOSP IP/OBS MODERATE 35: CPT | Performed by: PSYCHIATRY & NEUROLOGY

## 2025-07-31 PROCEDURE — 2500000001 HC RX 250 WO HCPCS SELF ADMINISTERED DRUGS (ALT 637 FOR MEDICARE OP)

## 2025-07-31 PROCEDURE — 99232 SBSQ HOSP IP/OBS MODERATE 35: CPT | Performed by: INTERNAL MEDICINE

## 2025-07-31 RX ADMIN — AMLODIPINE BESYLATE 5 MG: 5 TABLET ORAL at 08:13

## 2025-07-31 RX ADMIN — SERTRALINE 50 MG: 50 TABLET, FILM COATED ORAL at 08:13

## 2025-07-31 RX ADMIN — LITHIUM CARBONATE 750 MG: 450 TABLET, EXTENDED RELEASE ORAL at 20:08

## 2025-07-31 RX ADMIN — TAMSULOSIN HYDROCHLORIDE 0.4 MG: 0.4 CAPSULE ORAL at 08:13

## 2025-07-31 RX ADMIN — FOLIC ACID 1 MG: 1 TABLET ORAL at 08:13

## 2025-07-31 ASSESSMENT — COLUMBIA-SUICIDE SEVERITY RATING SCALE - C-SSRS
1. SINCE LAST CONTACT, HAVE YOU WISHED YOU WERE DEAD OR WISHED YOU COULD GO TO SLEEP AND NOT WAKE UP?: NO
6. HAVE YOU EVER DONE ANYTHING, STARTED TO DO ANYTHING, OR PREPARED TO DO ANYTHING TO END YOUR LIFE?: NO

## 2025-07-31 ASSESSMENT — PAIN - FUNCTIONAL ASSESSMENT: PAIN_FUNCTIONAL_ASSESSMENT: 0-10

## 2025-07-31 ASSESSMENT — PAIN SCALES - GENERAL
PAINLEVEL_OUTOF10: 0 - NO PAIN

## 2025-07-31 NOTE — PROGRESS NOTES
"Roman Ellis is a 50 y.o. male on day 3 of admission presenting with Severe depressed bipolar I disorder without psychotic features (Multi).        Subjective   Chart reviewed and case discussed with multidisciplinary treatment team.    Group therapy notes reviewed. Pt attended group this morning but had flat affect throughout the session. He plans on attending group sessions later throughout the day.     Today, he is continuing to walk around frequently. He states he feels a little better and \"feels something.\" He feels like there is a \"light at the end of the tunnel.\" When asked about his feelings around the treatment plan, he is \"cautiously optimistic\" and he has been meditating about the diagnosis and \"trying to wrap his head around the diagnosis.\" He is still anxious about his new job that he will starting. His energy is low and mostly blames that on the weather. Denies any hallucinations or thoughts that bothered him. Does not endorse any side effects from the medications he is on. He thinks the medications \"are finally kicking in.\" He does complain of not having regular bowel movements.    Has produced no all improvement thus far.  Continues to function well below his typical functional status in the community.  Patient and wife both remain in agreement the patient is not able to meet ordinary demands of daily life outside of a hospital setting at this time.  Continues to demonstrate anhedonia alexithymia increased guilt low energy endorses feeling depressed hopeless and helpless.    Wife came to the hospital today to visit and also met with  and provider separately.  She reports concern related to the incident leading to admission where she heard him entering the gun safe and recognized immediately that the sound of the buttons being pushed on the safe or indicative that he was attempting to obtain firearms.  Firearms are being removed from the home today.  Wife was tearful reported grave " "concern for patient's condition and that he may not return to his premorbid level of functioning.  She also continues to report that he is not able to function outside of a hospital at this time as he is also limiting his demonstration of ability to function even inside of a hospital at this time.        Objective   Vital signs in last 24 hours:  Temp:  [36.6 °C (97.9 °F)-36.7 °C (98.1 °F)] 36.7 °C (98.1 °F)  Heart Rate:  [51-58] 51  Resp:  [16] 16  BP: (113-123)/(61-69) 123/69    Sleep Log  Estimated \"Sleeping\" Durations   Night Est. Hours   07/27 11.00-12.00   07/28 10.00-10.25   07/29 10.50-11.25   07/30 10.75-11.75   07/31 0.00      Physical Exam  Constitutional:       General: He is not in acute distress.  HENT:      Right Ear: External ear normal.      Left Ear: External ear normal.      Nose: Nose normal.      Eyes:      Conjunctiva/sclera: Conjunctivae normal.      Pulmonary:      Effort: Pulmonary effort is normal.      Neurological:      General: No focal deficit present.       Mental Status Exam:  Appearance: laying in bed. Well groomed with beard.   Attitude/Behavior: Cooperative and more enthusiastic about his treatment plan, not violent  Motor Activity: No psychomotor agitation. + slowing. No abnormal movements, tremors or tics  Speech: coherent speech and expected rate, soft speech at times  Mood: feeling slightly better \"seeing a light at the end of the tunnel\"   Affect: Flat, constricted but reactive and Congruent with mood and topic of conversation  Thought Process: Linear, goal directed  Thought Content: Mood congruent, Negative self-talk, and concern for minimizing recent suicidality as expressed  Perception: Does not appear to be internally stimulated, denies avh when asked  Cognition: Alert and oriented x4 to person, place, time, and situation  Insight: Fair, in regards to understanding mental health condition, expressing frustration about slow progress  Judgement: Fair, limited in worrying " that he might be the same person anymore but hopes the medications will work.   Reliability of Information Provided: consistent with documentation    Results for orders placed or performed during the hospital encounter of 07/27/25 (from the past 96 hours)   Glucose, Fasting   Result Value Ref Range    Glucose, Fasting 122 (H) 74 - 99 mg/dL   Lipid Panel   Result Value Ref Range    Cholesterol 164 0 - 199 mg/dL    HDL-Cholesterol 36.1 mg/dL    Cholesterol/HDL Ratio 4.5     LDL Calculated 109 (H) <=99 mg/dL    VLDL 19 0 - 40 mg/dL    Triglycerides 93 0 - 149 mg/dL    Non HDL Cholesterol 128 0 - 149 mg/dL   TSH with reflex to Free T4 if abnormal   Result Value Ref Range    Thyroid Stimulating Hormone 0.65 0.44 - 3.98 mIU/L       Assessment/Plan     Assessment & Plan  Pure hypercholesterolemia    Hypertension    Tobacco use disorder    Severe depressed bipolar I disorder without psychotic features (Multi)    A:  Bipolar depression f31.4     Roman Ellis is a 50 y.o. male with a past medical history of recently diagnosed bipolar disorder, past history of anxiety, depression, and substance use disorder here for SI and worsening depressive symptoms.      He has has multiple recent admissions complicated by drug and alcohol use, however most recently with a diagnosis of bipolar disorder. Per patient, he has been feeling increasingly depressed over the past month, much worse over the past two weeks. It is likely that his current clinical picture is an acute depressive episode in the context of his bipolar disorder. He will require medication adjustments to best address his current clinical picture. He otherwise denies current suicidality, but exhibited concerning symptoms for suicidality outpatient and remains at moderate to high risk of suicide outpatient without additional treatment. He requires inpatient admission for medication adjustments and close clinical monitoring.      SBIRT - Patient was screened for substances  of abuse and had a positive screen.  Brief intervention then held with patient discussing the impact of substance abuse/use disorder on bio/psycho/social functioning and patient's readiness for change.  On discharge, patient will be referred to treatment for substance use disorder.         Plan  Biological -      - continue zoloft to 50 mg daily for depression - further titration as tolerated, beneficial  - titrate lithium to 750 mg ER, titrate as tolerated for mood disorder              Monitoring for lithium:              - Lithium level ordered for Thursday morning - on er 600 mg = 0.56  - Monitor renal function, thyroid function, reviewed EKG of may 19 2025 - no conduction anomaly noted  -consider addition of seroquel once lithium at steady state as mood adjunct     Literature supports lithium combination therapy - utilizing ssri as combo agent in lieu of antipsychotic seroquel but may add seroquel adjunctively depending on response to lithium + zoloft.  Avoiding previously used snri effexor to minimize potential for switching.  Consider ECT - not available in this location.        2. Psychological -      Patient is encouraged to participate with the therapeutic milieu and program and group therapy        3. Sociological -      Patient encouraged to cooperate with social work staff on issues relevant to discharge planning     4. Patient discussion -      Plan discussed with patient who concurs with the assessment, treatment plan, and with the following informed consent for medication:   The benefits, risks, most common side effects, possible consequences of not taking the medication, and alternatives to the recommended treatment has been discussed at length.             Goals for discharge include:  Psychiatric condition: to lower acute risk, return to baseline level of functioning, work to identify positive coping skills to manage psychiatric symptoms, allow for reconciliation of medications  Physical  condition: increase daily physical activity, demonstrate interest in completing daily activity, and complete Activities of Daily Living  Social function: identify protective factors and family supports, increase social interactions on the unit with peers and providing staff, and demonstrate appropriate problem solving skills for engaging after care on discharge        Medication Consent  Medication Consent: risks, benefits, side effects reviewed for all ordered meds     Brad Barros

## 2025-07-31 NOTE — GROUP NOTE
Group Topic: Decision Making   Group Date: 7/31/2025  Start Time: 1015  End Time: 1110  Facilitators: BOSSMAN Robledo   Department: [facility]    Number of Participants: 6   Group Focus: other Stop Overthinking!  Treatment Modality: Other: Recreation Therapy  Interventions utilized were exploration, group exercise, patient education, problem solving, and support  Purpose: coping skills, maladaptive thinking, feelings, irrational fears, communication skills, insight or knowledge, self-worth, self-care, relapse prevention strategies, and trigger / craving management    Name: Roman Ellis YOB: 1975   MR: [id number]      Facilitator: Recreational Therapist  Level of Participation: moderate  Quality of Participation: attentive and cooperative  Interactions with others: appropriate and gave feedback  Mood/Affect: flat  Triggers (if applicable): n/a  Cognition: coherent/clear  Progress: Moderate  Comments: pt problem is depressed mood.  Pt joins group with little encouragement.  Engages with prompting and is appropriate with participation.  Very flat affect and poor eye contact during group.   Plan: continue with services

## 2025-07-31 NOTE — PROGRESS NOTES
[facility]: [facility]  PROGRESS NOTE      Roman Ellis is a 50 y.o. male that is being seen  today for follow-up [facility]..  Subjective   Patient is being seen for follow-up at [facility].  Patient is on amlodipine and blood pressure has been fairly controlled.  Patient also is on Flomax and denies any urinary complaints.  Patient has been participating in activities.  Denies any suicidal ideations.      ROS  Negative for fever or chills  Negative for sore throat, ear pain, nasal discharge  Negative for cough, shortness of breath or wheezing  Negative for chest pain, palpitations, swelling of legs  Negative for abdominal pain, constipation, diarrhea, blood in the stools  Negative for urinary complaints  Negative for headache, dizziness, weakness or numbness  Negative for joint pain  Positive for depression or anxiety  All other systems reviewed and were negative   Vitals:    07/31/25 0700   BP: 123/69   Pulse: 51   Resp: 16   Temp: 36.7 °C (98.1 °F)   SpO2: 98%      Vitals:    07/27/25 1357   Weight: 79.4 kg (175 lb)     Body mass index is 25.84 kg/m².  Physical Exam  Constitutional: Patient does not appear to be in any acute distress  Head and Face: NCAT  Eyes: Normal external exam, EOMI  ENT: Normal external inspection of ears and nose. Oropharynx normal.  Cardiovascular: RRR, S1/S2, no murmurs, rubs, or gallops, radial pulses +2, no edema of extremities  Pulmonary: CTAB, no respiratory distress.  Abdomen: +BS, soft, non-tender, nondistended, no guarding or rebound, no masses noted  MSK: No joint swelling, normal movements of all extremities. Range of motion- normal.  Skin- No lesions, contusions, or erythema.  Peripheral puslses palpable bilaterally 2+  Neuro: AAO X3, Cranial nerves 2-12 grossly intact,DTR 2+ in all 4 limbs       LABS   [unfilled]  Lab Results   Component Value Date    GLUCOSE 109 (H) 07/26/2025    CALCIUM 9.6 07/26/2025     07/26/2025    K 4.2 07/26/2025    CO2 27  07/26/2025     07/26/2025    BUN 15 07/26/2025    CREATININE 1.06 07/26/2025     Lab Results   Component Value Date    ALT 17 07/26/2025    AST 12 07/26/2025    ALKPHOS 39 07/26/2025    BILITOT 0.6 07/26/2025     Lab Results   Component Value Date    WBC 11.0 07/26/2025    HGB 16.0 07/26/2025    HCT 46.9 07/26/2025    MCV 89 07/26/2025     07/26/2025     Lab Results   Component Value Date    CHOL 164 07/28/2025    CHOL 209 (H) 05/02/2025    CHOL 162 12/14/2024     Lab Results   Component Value Date    HDL 36.1 07/28/2025    HDL 89.0 05/02/2025    HDL 41.0 12/14/2024     Lab Results   Component Value Date    LDLCALC 109 (H) 07/28/2025    LDLCALC 111 (H) 05/02/2025    LDLCALC 105 (H) 12/14/2024     Lab Results   Component Value Date    TRIG 93 07/28/2025    TRIG 45 05/02/2025    TRIG 81 12/14/2024     Lab Results   Component Value Date    HGBA1C 6.3 (H) 12/14/2024     Other labs not included in the list above were reviewed either before or during this encounter.    History    Medical History[1]  Surgical History[2]  Family History[3]  Allergies[4]  Medications Ordered Prior to Encounter[5]  Immunization History   Administered Date(s) Administered    Flu vaccine (IIV4), preservative free *Check age/dose* 10/05/2020, 12/06/2023    Influenza, injectable, quadrivalent 11/14/2018    Influenza, seasonal, injectable 11/16/2012    Ariella SARS-CoV-2 Vaccination 06/06/2021    Tdap vaccine, age 7 year and older (BOOSTRIX, ADACEL) 08/10/2012     Patient's medical history was reviewed and updated either before or during this encounter.  ASSESSMENT / PLAN:  Active Hospital Problems    *Severe depressed bipolar I disorder without psychotic features (Multi)      Tobacco use disorder      Pure hypercholesterolemia      Hypertension       Patient is being seen for follow-up visit at [facility].  Patient has history of hypertension and is on amlodipine and blood pressure has been fairly controlled.  Will continue with  amlodipine.  Patient has been active smoker.  Patient is on nicotine patch.  Patient also has history of benign prostate hyperplasia and is on tamsulosin and symptoms have been well-controlled.  Will continue with the same medications.      Judah Elder MD            [1]   Past Medical History:  Diagnosis Date    Fever 02/12/2025    History of laparoscopic appendectomy 02/12/2025   [2] History reviewed. No pertinent surgical history.  [3]   Family History  Problem Relation Name Age of Onset    Diabetes Mother      Diabetes Father      Hypertension Father      Stroke Maternal Grandfather     [4] No Known Allergies  [5]   No current facility-administered medications on file prior to encounter.     Current Outpatient Medications on File Prior to Encounter   Medication Sig Dispense Refill    amLODIPine (Norvasc) 5 mg tablet Take 1 tablet (5 mg) by mouth once daily. 90 tablet 1    folic acid (Folvite) 1 mg tablet Take 1 tablet (1 mg) by mouth once daily. 90 tablet 1    gabapentin (Neurontin) 300 mg capsule Take 1 capsule (300 mg) by mouth 3 times a day. 90 capsule 0    lisinopril 10 mg tablet Take 1 tablet (10 mg) by mouth once daily. 90 tablet 1    lurasidone (Latuda) 40 mg tablet Take 1 tablet (40 mg) by mouth once daily in the evening. Take with meals. 30 tablet 1    multivitamin with minerals tablet Take 1 tablet by mouth once daily. 30 tablet 11    naltrexone (Depade) 50 mg tablet Take 1 tablet (50 mg) by mouth once daily at bedtime. 90 tablet 1    propranolol (Inderal) 10 mg tablet Take 2 tablets (20 mg) by mouth 2 times a day. 120 tablet 0    tamsulosin (Flomax) 0.4 mg 24 hr capsule Take 1 capsule (0.4 mg) by mouth once daily. 90 capsule 1    nicotine (Nicoderm CQ) 14 mg/24 hr patch Place 1 patch over 24 hours on the skin once daily. 30 patch 0    nicotine polacrilex (Nicorette) 2 mg gum Chew 1 each (2 mg) every 2 hours if needed for smoking cessation (nicotine craving, urge to smoke). 100 each 0     thiamine (Vitamin B-1) 100 mg tablet Take 1 tablet (100 mg) by mouth once daily. (Patient not taking: Reported on 7/27/2025) 30 tablet 11    venlafaxine XR (Effexor-XR) 37.5 mg 24 hr capsule Take 1 capsule (37.5 mg) by mouth once daily. 7 capsule 0

## 2025-07-31 NOTE — PROGRESS NOTES
LSW and provider met with pt to discuss disposition. Pt presents less irritable than during initial encounter. Pt reports that he is feeling a little better and that he is able to see the light. Pt reports that he has things that he is looking forward to such as his son's birthday and the new job that he will be starting. Pt talked about how he resigned from his previous job as it allowed for too much time on his hands. 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 Pt also discussed that the guns are removed from the home and did not have any issues with them being removed. Pt reported that he has not used them in years. Pt also seemed to be able to recognize that him going to check on the guns was concerning to his wife.     NIXON RossW

## 2025-07-31 NOTE — GROUP NOTE
Group Topic: Reminiscence   Group Date: 7/31/2025  Start Time: 1315  End Time: 1400  Facilitators: BOSSMAN Robledo   Department: [facility]    Number of Participants: 5   Group Focus: other Let's Talk  Treatment Modality: Other: Recreation Therapy  Interventions utilized were group exercise, reminiscence, and story telling  Purpose: feelings and other: fun, elevate mood, increase socialization, enhance self esteem    Name: Roman Ellis YOB: 1975   MR: [id number]      Facilitator: Recreational Therapist  Level of Participation: active  Quality of Participation: cooperative  Interactions with others: appropriate and gave feedback  Mood/Affect: appropriate and flat  Triggers (if applicable): n/a  Cognition: coherent/clear  Progress: Significant  Comments: pt problem is depressed mood.  Pt more active during this group.  Responses to questions are more spontaneous.  Pt also displays better eye contact with encounter.    Plan: continue with services

## 2025-07-31 NOTE — NURSING NOTE
VANNESSA NOTE     Problem:  Depression     Behavior:  Patient appeared withdrawn and quiet this shift. Often seen pacing the halls. Maintained good eye contact but spoke in a low tone. Denies any suicidal or homicidal ideation.     Group Participation: Active  Appetite/Meals: Adequate       Interventions:  Administer medication as ordered and complete shift assessment     Response:  Compliant with care      Plan:  Adhere to treatment plan and monitor Q15 minute safety checks

## 2025-07-31 NOTE — CARE PLAN
The patient's goals for the shift include Go home    The clinical goals for the shift include Maintain safety, promote sleep    Over the shift, the patient did  make progress toward the following goals.    Problem: Chronic Conditions and Co-morbidities  Goal: Patient's chronic conditions and co-morbidity symptoms are monitored and maintained or improved  Outcome: Progressing     Problem: Chronic Conditions and Co-morbidities  Goal: Patient's chronic conditions and co-morbidity symptoms are monitored and maintained or improved  Outcome: Progressing

## 2025-07-31 NOTE — GROUP NOTE
Group Topic: Goals   Group Date: 7/30/2025  Start Time: 2010  End Time: 2025  Facilitators: Mandy Vela   Department: [facility]     Number of Participants: 7   Group Focus: check in  Treatment Modality: Interpersonal Therapy  Interventions utilized were reminiscence  Purpose: feelings and other: check in    Name: Roman Ellis YOB: 1975   MR: [id number]      Facilitator: Mental [facility]  Level of Participation: did not attend  Quality of Participation: was invited patient declined  Interactions with others: was calm when doing vitals and appropriate  Mood/Affect: appropriate  Triggers (if applicable): none  Cognition: coherent/clear  Progress: Minimal  Comments: patient presented with depression  Plan: continue with services

## 2025-08-01 PROCEDURE — 2500000002 HC RX 250 W HCPCS SELF ADMINISTERED DRUGS (ALT 637 FOR MEDICARE OP, ALT 636 FOR OP/ED): Performed by: INTERNAL MEDICINE

## 2025-08-01 PROCEDURE — 99232 SBSQ HOSP IP/OBS MODERATE 35: CPT | Performed by: INTERNAL MEDICINE

## 2025-08-01 PROCEDURE — 2500000002 HC RX 250 W HCPCS SELF ADMINISTERED DRUGS (ALT 637 FOR MEDICARE OP, ALT 636 FOR OP/ED): Performed by: PSYCHIATRY & NEUROLOGY

## 2025-08-01 PROCEDURE — 2500000001 HC RX 250 WO HCPCS SELF ADMINISTERED DRUGS (ALT 637 FOR MEDICARE OP)

## 2025-08-01 PROCEDURE — 1140000001 HC PRIVATE PSYCH ROOM DAILY

## 2025-08-01 PROCEDURE — 99232 SBSQ HOSP IP/OBS MODERATE 35: CPT

## 2025-08-01 PROCEDURE — 2500000001 HC RX 250 WO HCPCS SELF ADMINISTERED DRUGS (ALT 637 FOR MEDICARE OP): Performed by: INTERNAL MEDICINE

## 2025-08-01 RX ADMIN — AMLODIPINE BESYLATE 5 MG: 5 TABLET ORAL at 08:04

## 2025-08-01 RX ADMIN — SERTRALINE 50 MG: 50 TABLET, FILM COATED ORAL at 08:03

## 2025-08-01 RX ADMIN — LITHIUM CARBONATE 750 MG: 450 TABLET, EXTENDED RELEASE ORAL at 20:55

## 2025-08-01 RX ADMIN — FOLIC ACID 1 MG: 1 TABLET ORAL at 08:03

## 2025-08-01 RX ADMIN — TAMSULOSIN HYDROCHLORIDE 0.4 MG: 0.4 CAPSULE ORAL at 08:04

## 2025-08-01 ASSESSMENT — PAIN SCALES - GENERAL
PAINLEVEL_OUTOF10: 0 - NO PAIN
PAINLEVEL_OUTOF10: 0 - NO PAIN

## 2025-08-01 ASSESSMENT — PAIN - FUNCTIONAL ASSESSMENT
PAIN_FUNCTIONAL_ASSESSMENT: 0-10
PAIN_FUNCTIONAL_ASSESSMENT: 0-10

## 2025-08-01 NOTE — GROUP NOTE
Group Topic: Goals   Group Date: 7/31/2025  Start Time: 2010  End Time: 2025  Facilitators: Mandy Vela   Department: [facility]     Number of Participants: 8   Group Focus: check in  Treatment Modality: Individual Therapy  Interventions utilized were story telling and support  Purpose: feelings    Name: Roman Ellis YOB: 1975   MR: [id number]      Facilitator: Mental Health [facility]  Level of Participation: did not attend  Quality of Participation: patient was invited , declined snack and group  Interactions with others: was calm when doing vitals  Mood/Affect: appropriate  Triggers (if applicable): none  Cognition: coherent/clear  Progress: Minimal  Comments: patient presented with depression  Plan: continue with services

## 2025-08-01 NOTE — GROUP NOTE
"Group Topic: Reminiscence   Group Date: 8/1/2025  Start Time: 1330  End Time: 1430  Facilitators: BOSSMAN Robledo   Department: [facility]    Number of Participants: 5   Group Focus: other Lifestories  Treatment Modality: Other: Recreation Therapy  Interventions utilized were exploration, reminiscence, and story telling  Purpose: feelings and other: fun, elevate mood, increase socialization, enhance self esteem    Name: Roman Ellis YOB: 1975   MR: [id number]      Facilitator: Recreational Therapist  Level of Participation: active  Quality of Participation: appropriate/pleasant, attentive, and cooperative  Interactions with others: appropriate and gave feedback  Mood/Affect: appropriate and blunted  Triggers (if applicable): n/a  Cognition: coherent/clear  Progress: Significant  Comments: pt problem is depressed mood.  Pt remain cooperative and active with participation in group.  Reports he is \"almost feeling like myself\".  Makes better eye contact and more blunted with affect.  Plan: continue with services      "

## 2025-08-01 NOTE — CARE PLAN
The patient's goals for the shift include Go home    The clinical goals for the shift include maintain safety

## 2025-08-01 NOTE — PROGRESS NOTES
Roman Ellis is a 50 y.o. male on day 5 of admission presenting with Severe depressed bipolar I disorder without psychotic features (Multi).      Subjective   Chart reviewed and case discussed with multidisciplinary treatment team.     Today talked to patient. Discussed how things are going. He endorses that he is feeling better than earlier in the week. He endorses feeling more hopeful. Writer thanked patient for participating in groups and getting up and around the unit and asked how that went. He states that he has been agreeable to getting around the unit but states he is still very bored. Specifically, he states that he misses walking. He says at home he would walk up to 5 miles outside, but here he is confined to the hallway. Discussed with patient that 32 lengths of main hallway near nursing station is a measured mile.  He appreciated this information.  He is trying to pass the time with books and is currently reading [place] by Sony Mckeon. He says it is not his usual genre of Sci-Fi, but he still likes the book. When asked if anything is bothering him. He states the motion sensor light sensitivity results in on/off has been frustrating because they wake him up in the middle of the night.  Remains with limited endorsement of preadmission suicidality, and results in treatment team concern for minimization of suicidality in this way including denial of suicidality in hospital though remaining concern that sole denial of suicidality by patient is not consistent with standard of care assessments for suicidality.  With other persisting symptoms, though with some minor improvements, will aim to seek global improvement and not rely solely on patient denial of suicidality.  Patient wife is due to come to facility this weekend to visit patient and will liaise with wife early next wife to gather her opinions as to progress at that time.           Objective     Last Recorded Vitals  Blood pressure 134/78,  "pulse 52, temperature 36.7 °C (98.1 °F), temperature source Temporal, resp. rate 16, height 1.753 m (5' 9\"), weight 79.4 kg (175 lb), SpO2 97%.    Sleep Log  Estimated \"Sleeping\" Durations   Night Est. Hours   07/27 11.00-12.00   07/28 10.00-10.25   07/29 10.50-11.25   07/30 10.75-11.75   07/31 10.25-11.00        Review of Systems    Psychiatric ROS - Adult  Anxiety: Negative  Depression: anhedonia  Delirium: negative  Psychosis: negative  Pearl: negative  Safety Issues: Denies SI/ AVH  Psychiatric ROS Comment: n/a    Physical Exam  Constitutional:       Appearance: Normal appearance.   HENT:      Head: Normocephalic and atraumatic.     Eyes:      Comments: Conjunctiva non-erythematous, non-icteric   Pulmonary:      Comments: Without increased respiratory effort    Neurological:      General: No focal deficit present.      Mental Status: He is alert.      Comments: Seen to be ambulating appropriately on unit     Mental Status Examination  General Appearance: Laying in bed, beard, tattoo on inner right arm.  Gait/Station: ambulating appropriately  Speech: appropriate rate, volume, prosody  Mood: \"better than earlier this week\"  Affect: Flat and constricted but reactive, congruent with mood.   Thought Process: Linear, goal directed  Thought Content: mood congruent, focusing on his boredom on the unit  Perception: No perceptual abnormalities noted  Level of Consciousness: Alert  Orientation: Alert and oriented to person, place, time and situation as evidenced by being aware of his hospitalization, time of day, and self  Attention and Concentration: appears to be intact due to ability to have full conversation without need to redirect patient  Insight: Fair, in regards to understanding mental health condition, expressing frustration about slow progress   Judgment: Fair, hoping the medications would work more quickly than they do, but expressing agreement with the plan and openness to the process of " hospitalization      Psychiatric Risk Assessment  Violence Risk Assessment: acces to weapons, major mental illness, male,  history or weapons training, presence of firearms in home, substance abuse, and unemployment  Acute Risk of Harm to Others is Considered: low   Suicide Risk Assessment: access to weapons, , current psychiatric illness, feelings of hopelessness, history of trauma or abuse, male, presence of firearms in home, severe anxiety, substance abuse, and suicidal ideations  Protective Factors against Suicide: adherence to  treatment, child-related concerns/living with children at home < 18 yrs, marriage/partnership, and positive family relationships  Acute Risk of Harm to Self is Considered: moderate    Relevant Results  Results for orders placed or performed during the hospital encounter of 07/27/25 (from the past 96 hours)   TSH with reflex to Free T4 if abnormal   Result Value Ref Range    Thyroid Stimulating Hormone 0.65 0.44 - 3.98 mIU/L   Lithium level   Result Value Ref Range    Lithium 0.56 (L) 0.60 - 1.20 mmol/L         Assessment & Plan  Pure hypercholesterolemia    Hypertension    Tobacco use disorder    Severe depressed bipolar I disorder without psychotic features (Multi)    Bipolar depression f31.4     Roman Ellis is a 50 y.o. male with a past medical history of recently diagnosed bipolar disorder, past history of anxiety, depression, and substance use disorder here for SI and worsening depressive symptoms.      He has has multiple recent admissions complicated by drug and alcohol use, however most recently with a diagnosis of bipolar disorder. Per patient, he has been feeling increasingly depressed over the past month, much worse over the past two weeks. It is likely that his current clinical picture is an acute depressive episode in the context of his bipolar disorder. He will require medication adjustments to best address his current clinical picture. He otherwise denies  current suicidality, but exhibited concerning symptoms for suicidality outpatient and remains at moderate to high risk of suicide outpatient without additional treatment. He requires inpatient admission for medication adjustments and close clinical monitoring.      SBIRT - Patient was screened for substances of abuse and had a positive screen.  Brief intervention then held with patient discussing the impact of substance abuse/use disorder on bio/psycho/social functioning and patient's readiness for change.  On discharge, patient will be referred to treatment for substance use disorder.     8/1- patient reports that he is feeling better today. He states that he is still bored, but seeing progress. Subjective mood improvement reported.  Objective findings not yet tracking however. Patient is participating in groups and is seen walking around the unit.       Plan  Biological -      - continue zoloft 50 mg daily for depression - further titration as tolerated, beneficial  - continue lithium 750 mg ER, titrate as tolerated for mood disorder              Monitoring for lithium:   - next level due Monday 8/4 and is ordered for then              - Lithium level 7/31 while on Lithium er 600 mg = 0.56  - renal function, thyroid function within normal limits, reviewed EKG of may 19 2025 - no conduction anomaly noted  -consider addition of seroquel once lithium at steady state as mood adjunct     Literature supports lithium combination therapy - utilizing ssri as combo agent in lieu of antipsychotic seroquel but may add seroquel adjunctively depending on response to lithium + zoloft.  Avoiding previously used snri effexor to minimize potential for switching.  Consider ECT - not available in this location.        2. Psychological -      Patient is encouraged to participate with the therapeutic milieu and program and group therapy        3. Sociological -      Patient encouraged to cooperate with social work staff on issues  relevant to discharge planning     4. Patient discussion -      Plan discussed with patient who concurs with the assessment, treatment plan, and with the following informed consent for medication:   The benefits, risks, most common side effects, possible consequences of not taking the medication, and alternatives to the recommended treatment has been discussed at length.                          Navya Bird MD

## 2025-08-01 NOTE — NURSING NOTE
Assumed care of patient. Patient here for increased depression and suicidal ideation. Recent diagnosis of bipolar disorder. Felt he was no longer able to care for himself. Patient cooperative but isolative. Eats 100% of meals and attends group. Denies SI tonight. Will continue to monitor.

## 2025-08-01 NOTE — PROGRESS NOTES
[facility]: [facility]  PROGRESS NOTE      Roman Ellis is a 50 y.o. male that is being seen  today for follow-up [facility]..  Subjective   Patient is being seen for follow-up at [facility].  Patient is on amlodipine and blood pressure has been fairly controlled.  Patient also is on Flomax and denies any urinary complaints.  Patient has been participating in activities.  Denies any suicidal ideations.  Patient is a lab work reviewed.  Patient lithium levels are slightly low.      ROS  Negative for fever or chills  Negative for sore throat, ear pain, nasal discharge  Negative for cough, shortness of breath or wheezing  Negative for chest pain, palpitations, swelling of legs  Negative for abdominal pain, constipation, diarrhea, blood in the stools  Negative for urinary complaints  Negative for headache, dizziness, weakness or numbness  Negative for joint pain  Positive for depression or anxiety  All other systems reviewed and were negative   Vitals:    08/01/25 0700   BP: 134/78   Pulse: 52   Resp: 16   Temp: 36.7 °C (98.1 °F)   SpO2: 97%      Vitals:    07/27/25 1357   Weight: 79.4 kg (175 lb)     Body mass index is 25.84 kg/m².  Physical Exam  Constitutional: Patient does not appear to be in any acute distress  Head and Face: NCAT  Eyes: Normal external exam, EOMI  ENT: Normal external inspection of ears and nose. Oropharynx normal.  Cardiovascular: RRR, S1/S2, no murmurs, rubs, or gallops, radial pulses +2, no edema of extremities  Pulmonary: CTAB, no respiratory distress.  Abdomen: +BS, soft, non-tender, nondistended, no guarding or rebound, no masses noted  MSK: No joint swelling, normal movements of all extremities. Range of motion- normal.  Skin- No lesions, contusions, or erythema.  Peripheral puslses palpable bilaterally 2+  Neuro: AAO X3, Cranial nerves 2-12 grossly intact,DTR 2+ in all 4 limbs       LABS   [unfilled]  Lab Results   Component Value Date    GLUCOSE 109 (H) 07/26/2025     CALCIUM 9.6 07/26/2025     07/26/2025    K 4.2 07/26/2025    CO2 27 07/26/2025     07/26/2025    BUN 15 07/26/2025    CREATININE 1.06 07/26/2025     Lab Results   Component Value Date    ALT 17 07/26/2025    AST 12 07/26/2025    ALKPHOS 39 07/26/2025    BILITOT 0.6 07/26/2025     Lab Results   Component Value Date    WBC 11.0 07/26/2025    HGB 16.0 07/26/2025    HCT 46.9 07/26/2025    MCV 89 07/26/2025     07/26/2025     Lab Results   Component Value Date    CHOL 164 07/28/2025    CHOL 209 (H) 05/02/2025    CHOL 162 12/14/2024     Lab Results   Component Value Date    HDL 36.1 07/28/2025    HDL 89.0 05/02/2025    HDL 41.0 12/14/2024     Lab Results   Component Value Date    LDLCALC 109 (H) 07/28/2025    LDLCALC 111 (H) 05/02/2025    LDLCALC 105 (H) 12/14/2024     Lab Results   Component Value Date    TRIG 93 07/28/2025    TRIG 45 05/02/2025    TRIG 81 12/14/2024     Lab Results   Component Value Date    HGBA1C 6.3 (H) 12/14/2024     Other labs not included in the list above were reviewed either before or during this encounter.    History    Medical History[1]  Surgical History[2]  Family History[3]  Allergies[4]  Medications Ordered Prior to Encounter[5]  Immunization History   Administered Date(s) Administered    Flu vaccine (IIV4), preservative free *Check age/dose* 10/05/2020, 12/06/2023    Influenza, injectable, quadrivalent 11/14/2018    Influenza, seasonal, injectable 11/16/2012    Ariella SARS-CoV-2 Vaccination 06/06/2021    Tdap vaccine, age 7 year and older (BOOSTRIX, ADACEL) 08/10/2012     Patient's medical history was reviewed and updated either before or during this encounter.  ASSESSMENT / PLAN:  Active Hospital Problems    *Severe depressed bipolar I disorder without psychotic features (Multi)      Tobacco use disorder      Pure hypercholesterolemia      Hypertension       Patient is being seen for follow-up visit at [facility].  Patient has history of hypertension and is on  amlodipine and blood pressure has been fairly controlled.  Will continue with amlodipine.  Patient has been active smoker.  Patient is on nicotine patch.  Patient also has history of benign prostate hyperplasia and is on tamsulosin and symptoms have been well-controlled.  Will continue with the same medications.  Patient lithium levels are mildly low.  Patient is being seen by geropsych team.      Judah Elder MD              [1]   Past Medical History:  Diagnosis Date    Fever 02/12/2025    History of laparoscopic appendectomy 02/12/2025   [2] History reviewed. No pertinent surgical history.  [3]   Family History  Problem Relation Name Age of Onset    Diabetes Mother      Diabetes Father      Hypertension Father      Stroke Maternal Grandfather     [4] No Known Allergies  [5]   No current facility-administered medications on file prior to encounter.     Current Outpatient Medications on File Prior to Encounter   Medication Sig Dispense Refill    amLODIPine (Norvasc) 5 mg tablet Take 1 tablet (5 mg) by mouth once daily. 90 tablet 1    folic acid (Folvite) 1 mg tablet Take 1 tablet (1 mg) by mouth once daily. 90 tablet 1    gabapentin (Neurontin) 300 mg capsule Take 1 capsule (300 mg) by mouth 3 times a day. 90 capsule 0    lisinopril 10 mg tablet Take 1 tablet (10 mg) by mouth once daily. 90 tablet 1    lurasidone (Latuda) 40 mg tablet Take 1 tablet (40 mg) by mouth once daily in the evening. Take with meals. 30 tablet 1    multivitamin with minerals tablet Take 1 tablet by mouth once daily. 30 tablet 11    naltrexone (Depade) 50 mg tablet Take 1 tablet (50 mg) by mouth once daily at bedtime. 90 tablet 1    propranolol (Inderal) 10 mg tablet Take 2 tablets (20 mg) by mouth 2 times a day. 120 tablet 0    tamsulosin (Flomax) 0.4 mg 24 hr capsule Take 1 capsule (0.4 mg) by mouth once daily. 90 capsule 1    nicotine (Nicoderm CQ) 14 mg/24 hr patch Place 1 patch over 24 hours on the skin once daily. 30 patch 0     nicotine polacrilex (Nicorette) 2 mg gum Chew 1 each (2 mg) every 2 hours if needed for smoking cessation (nicotine craving, urge to smoke). 100 each 0    thiamine (Vitamin B-1) 100 mg tablet Take 1 tablet (100 mg) by mouth once daily. (Patient not taking: Reported on 7/27/2025) 30 tablet 11    venlafaxine XR (Effexor-XR) 37.5 mg 24 hr capsule Take 1 capsule (37.5 mg) by mouth once daily. 7 capsule 0

## 2025-08-01 NOTE — PROGRESS NOTES
" REHAB Therapy Assessment & Treatment    Patient Name: Roman Ellis  MRN: [id number]  Today's Date: 8/1/2025          Recreation note : pt is alert and oriented x 3 with some poor insight/judgement.  Has attended some groups since admission and has displayed flat affect and limited eye contact with interactions.   Participates in group with slight prompting and has been seen walking around unit for exercise.  Pt continues to endorse depressed mood at this time.  Pt is eating well and reports he is sleeping \"ok\".  Will continue to encourage pt to attend groups of choice daily.            "

## 2025-08-01 NOTE — CARE PLAN
The patient's goals for the shift include Go home    The clinical goals for the shift include maintain safety    Over the shift, the patient did make progress toward the following goals.    Problem: Pain - Adult  Goal: Verbalizes/displays adequate comfort level or baseline comfort level  Outcome: Progressing

## 2025-08-01 NOTE — GROUP NOTE
Group Topic: Personal Responsibility   Group Date: 8/1/2025  Start Time: 1000  End Time: 1110  Facilitators: BOSSMAN Robledo   Department: [facility] REHABTH VIRTUAL    Number of Participants: 3   Group Focus: other Accepting Personal Responsibility  Treatment Modality: Other: Recreation Therapy  Interventions utilized were exploration, group exercise, patient education, problem solving, and support  Purpose: coping skills, maladaptive thinking, feelings, irrational fears, communication skills, insight or knowledge, self-worth, self-care, relapse prevention strategies, and trigger / craving management    Name: Roman Ellis YOB: 1975   MR: [id number]      Facilitator: Recreational Therapist  Level of Participation: active  Quality of Participation: appropriate/pleasant, attentive, and cooperative  Interactions with others: appropriate and gave feedback  Mood/Affect: appropriate and flat  Triggers (if applicable): n/a  Cognition: coherent/clear  Progress: Significant  Comments: pt problem is depressed mood.  Pt joins group with little encouragement.  Active with participation and appropriate with eye contact.  Flat with affect during group.   Plan: continue with services

## 2025-08-01 NOTE — NURSING NOTE
B      BIRP NOTE     Problem:  Depression     Behavior:  Pt isolative to room throughout shift. Flat affect noted, pleasant with interaction. Compliant with meds. Cooperative with care. Pt denies SI this shift.  Group Participation: none  Appetite/Meals: Declined HS snack     Interventions:  1:1 provided with reassurance. Evening meds administered. Encouraged pt to make needs known. Encouraged activities.     Response:  Pt calm and cooperative. No s/s of distress noted.      Plan:  Will continue to monitor and assist. Maintain q 15 minute rounds for safety.

## 2025-08-02 PROCEDURE — 2500000002 HC RX 250 W HCPCS SELF ADMINISTERED DRUGS (ALT 637 FOR MEDICARE OP, ALT 636 FOR OP/ED): Performed by: PSYCHIATRY & NEUROLOGY

## 2025-08-02 PROCEDURE — 99231 SBSQ HOSP IP/OBS SF/LOW 25: CPT | Performed by: NURSE PRACTITIONER

## 2025-08-02 PROCEDURE — 2500000002 HC RX 250 W HCPCS SELF ADMINISTERED DRUGS (ALT 637 FOR MEDICARE OP, ALT 636 FOR OP/ED): Performed by: INTERNAL MEDICINE

## 2025-08-02 PROCEDURE — 1140000001 HC PRIVATE PSYCH ROOM DAILY

## 2025-08-02 PROCEDURE — 2500000001 HC RX 250 WO HCPCS SELF ADMINISTERED DRUGS (ALT 637 FOR MEDICARE OP)

## 2025-08-02 PROCEDURE — 2500000001 HC RX 250 WO HCPCS SELF ADMINISTERED DRUGS (ALT 637 FOR MEDICARE OP): Performed by: INTERNAL MEDICINE

## 2025-08-02 RX ADMIN — SERTRALINE 50 MG: 50 TABLET, FILM COATED ORAL at 08:14

## 2025-08-02 RX ADMIN — AMLODIPINE BESYLATE 5 MG: 5 TABLET ORAL at 08:14

## 2025-08-02 RX ADMIN — FOLIC ACID 1 MG: 1 TABLET ORAL at 08:14

## 2025-08-02 RX ADMIN — LITHIUM CARBONATE 750 MG: 450 TABLET, EXTENDED RELEASE ORAL at 20:47

## 2025-08-02 RX ADMIN — TAMSULOSIN HYDROCHLORIDE 0.4 MG: 0.4 CAPSULE ORAL at 08:14

## 2025-08-02 ASSESSMENT — PAIN SCALES - GENERAL: PAINLEVEL_OUTOF10: 0 - NO PAIN

## 2025-08-02 NOTE — CARE PLAN
The patient's goals for the shift include Go home    The clinical goals for the shift include maintain safety    Over the shift, the patient did make progress toward the following goals. Barriers to progression include patient is isolative. Recommendations to address these barriers include encourage patient to socialize with peers.

## 2025-08-02 NOTE — NURSING NOTE
..BIRP NOTE     Problem:  Depression     Behavior:  Patient quiet tonight. Isolative to room. Did not come out for snack tonight. Pleasant but flat affect.   Group Participation: Patient did attend group today and actively participated.  Appetite/Meals: 100/100/100.  Declined HS snack.       Interventions:  Administered meds per orders. Encouraged patient to come out for snack and socialize with peers.     Response:  Patient remained isolative. Resting in his room with door shut.     Plan:  Will continue with current plan while maintaining patient safety

## 2025-08-02 NOTE — CARE PLAN
The patient's goals for the shift include to feel better    The clinical goals for the shift include maintain safety    Over the shift, the patient did not make progress toward the following goals. Barriers to progression include none noted, patient eager to feel better and is medication compliant. Recommendations to address these barriers include.

## 2025-08-02 NOTE — GROUP NOTE
Group Topic: Other   Group Date: 8/2/2025  Start Time: 1330  End Time: 1430  Facilitators: PHILLIP FinneyS   Department: [facility] REHABTH VIRTUAL    Number of Participants: 9   Group Focus: concentration, leisure skills, self-esteem, and social skills  Treatment Modality: Leisure Development and Other: Recreation Therapy  Interventions utilized were group exercise, leisure development, and mental fitness  Purpose: Patients engaged in group game of Catch Phrase. This game can help to improve frustration tolerance, concentration, improving social communication, and building rapport for a positive social interaction while encouraging flexibility in thinking  Name: Roman Ellis YOB: 1975   MR: [id number]      Facilitator: Recreational Therapist  Level of Participation: active  Quality of Participation: appropriate/pleasant, attentive, cooperative, and engaged  Interactions with others: appropriate  Mood/Affect: appropriate  Cognition: coherent/clear  Progress: Gaining insight or knowledge  Comments: Engages easily with staff and peers. Appropriate and cooperative interactions. Displays good attetnion to tasks. Affect appears less flat as previous interactions.   Plan: continue with services

## 2025-08-02 NOTE — GROUP NOTE
Group Topic: Goals   Group Date: 8/1/2025  Start Time: 2010  End Time: 2025  Facilitators: Mandy Vela   Department: [facility] Geriatric     Number of Participants: 7   Group Focus: check in  Treatment Modality: Interpersonal Therapy  Interventions utilized were support  Purpose: feelings and self-care    Name: Roman Ellis YOB: 1975   MR: [id number]      Facilitator: Mental Health [facility]  Level of Participation: did not attend  Quality of Participation: was invited patient declined snack and evening group  Interactions with others: unknown, patient didn't leave room this evening  Mood/Affect: closed / guarded and depressed  Triggers (if applicable): none  Cognition: coherent/clear  Progress: Minimal  Comments: patient presented with depressed bipolar 1 disorder  Plan: continue with services

## 2025-08-02 NOTE — NURSING NOTE
VANNESSA NOTE     Problem:  Depression     Behavior:  Patient was quiet and calm throughout shift. He did attend both group sessions during the day.  Patient found frequently walking the halls which he enjoys.      Appetite/Meals: good     Interventions:  Administered scheduled medications.     Response:  Compliant with care and medications.  Patient did have one bout of stool incontinence that he attributes to a side effect of medication.  Patient felt embarrassed at the time.  Reassurance given.     Plan:  Continue current plan of care.

## 2025-08-02 NOTE — GROUP NOTE
Group Topic: Self-Care/Wellness   Group Date: 8/2/2025  Start Time: 1015  End Time: 1130  Facilitators: PHILLIP FinneyS   Department: [facility] REHABTH VIRTUAL    Number of Participants: 8   Group Focus: mental, emotional and physical well-being   Treatment Modality: Leisure Development, Education, Recreation Therapy  Interventions utilized were exploration, group exercise, leisure development, mental fitness, patient education, and support  Purpose: In this group session, patients engaged in Q&A focused on?promoting a lifestyle aimed at helping patients increase control over their health and improve quality of life, enhance cognition and stimulate socialization. Topics include healthy sleep, leisure skills, physical activity, nutrition, and stress relief.     Name: Roman Ellis YOB: 1975   MR: [id number]      Facilitator: Recreational Therapist  Level of Participation: active  Quality of Participation: appropriate/pleasant, attentive, cooperative, and engaged  Interactions with others: appropriate and supportive  Mood/Affect: appropriate and brightens with interaction  Cognition: coherent/clear  Progress: Gaining insight or knowledge  Comments: Engages easily with staff and peers. Appropriate and cooperative interactions. Participates in discussions. Displays increased attention to tasks and good eye contact.   Plan: continue with services

## 2025-08-02 NOTE — GROUP NOTE
Group Topic: Goals   Group Date: 8/2/2025  Start Time: 0730  End Time: 0800  Facilitators: Denisse Castillo   Department: [facility] Geriatric     Number of Participants: 10   Group Focus: check in and goals  Treatment Modality: Other: Daily check in and goal setting  Interventions utilized were assignment  Purpose: other: Daily check in and goal setting group    Name: Roman Ellis YOB: 1975   MR: [id number]      Facilitator: Mental Health [facility]  Level of Participation: minimal  Quality of Participation: appropriate/pleasant, attentive, cooperative, and engaged  Interactions with others: appropriate  Mood/Affect: appropriate  Cognition: coherent/clear  Progress: Minimal  Comments: Patient problem is bipolar. Patient attended group but did not partake in activity.  Plan: continue with services

## 2025-08-02 NOTE — PROGRESS NOTES
"Roman Ellis is a 50 y.o. male on day 6 of admission presenting with Severe depressed bipolar I disorder without psychotic features (Multi).      Subjective   Chart reviewed and case discussed with nursing.    Pt states he is bored, otherwise he states he is feeling better and recognizes benefits of this admission. Participating w/groups. PO and sleep maintained. No overnight concerns from staff.         Objective     Last Recorded Vitals  Blood pressure 114/70, pulse 51, temperature 36.7 °C (98.1 °F), temperature source Temporal, resp. rate 16, height 1.753 m (5' 9\"), weight 79.4 kg (175 lb), SpO2 99%.    Sleep Log  Estimated \"Sleeping\" Durations   Night Est. Hours   07/27 11.00-12.00   07/28 10.00-10.25   07/29 10.50-11.25   07/30 10.75-11.75   07/31 11.00-11.75   08/01 13.75-14.00   08/02 0.00        Review of Systems    Psychiatric ROS - Adult  Anxiety: Negative  Depression: anhedonia  Delirium: negative  Psychosis: negative. Denies AVH.  Pearl: negative  Safety Issues: Denies SI/HI.  Psychiatric ROS Comment: n/a    Physical Exam  Constitutional:       Appearance: Normal appearance.   HENT:      Head: Normocephalic and atraumatic.     Eyes:      Comments: Conjunctiva non-erythematous, non-icteric   Pulmonary:      Comments: Without increased respiratory effort    Neurological:      General: No focal deficit present.      Mental Status: He is alert.      Comments: Seen to be ambulating appropriately on unit     Mental Status Examination  General Appearance: Laying in bed, beard, tattoo on inner right arm.  Gait/Station: ambulating appropriately  Speech: appropriate rate, volume, prosody  Mood: \"better than I was before\"  Affect: Flat and constricted but reactive, congruent with mood.   Thought Process: Linear, goal directed  Thought Content: mood congruent, focusing on his boredom on the unit  Perception: No perceptual abnormalities noted  Level of Consciousness: Alert  Orientation: Alert and oriented to person, " place, time and situation as evidenced by being aware of his hospitalization, time of day, and self  Attention and Concentration: appears to be intact due to ability to have full conversation without need to redirect patient  Insight: Fair, in regards to understanding mental health condition, expressing frustration about slow progress. However verbalizes acknowledging improvements and gains during this admission  Judgment: Fair, hoping the medications would work more quickly than they do, but expressing agreement with the plan and openness to the process of hospitalization      Psychiatric Risk Assessment  Violence Risk Assessment: acces to weapons, major mental illness, male,  history or weapons training, presence of firearms in home, substance abuse, and unemployment  Acute Risk of Harm to Others is Considered: low   Suicide Risk Assessment: access to weapons, , current psychiatric illness, feelings of hopelessness, history of trauma or abuse, male, presence of firearms in home, severe anxiety, substance abuse, and suicidal ideations  Protective Factors against Suicide: adherence to  treatment, child-related concerns/living with children at home < 18 yrs, marriage/partnership, and positive family relationships  Acute Risk of Harm to Self is Considered: moderate    Relevant Results  Results for orders placed or performed during the hospital encounter of 07/27/25 (from the past 96 hours)   Lithium level   Result Value Ref Range    Lithium 0.56 (L) 0.60 - 1.20 mmol/L         Assessment & Plan  Pure hypercholesterolemia    Hypertension    Tobacco use disorder    Severe depressed bipolar I disorder without psychotic features (Multi)    Bipolar depression f31.4     Roman Ellis is a 50 y.o. male with a past medical history of recently diagnosed bipolar disorder, past history of anxiety, depression, and substance use disorder here for SI and worsening depressive symptoms.      He has has multiple recent  admissions complicated by drug and alcohol use, however most recently with a diagnosis of bipolar disorder. Per patient, he has been feeling increasingly depressed over the past month, much worse over the past two weeks. It is likely that his current clinical picture is an acute depressive episode in the context of his bipolar disorder. He will require medication adjustments to best address his current clinical picture. He otherwise denies current suicidality, but exhibited concerning symptoms for suicidality outpatient and remains at moderate to high risk of suicide outpatient without additional treatment. He requires inpatient admission for medication adjustments and close clinical monitoring.      SBIRT - Patient was screened for substances of abuse and had a positive screen.  Brief intervention then held with patient discussing the impact of substance abuse/use disorder on bio/psycho/social functioning and patient's readiness for change.  On discharge, patient will be referred to treatment for substance use disorder.     8/2- patient reports feeling better than prior to admission. He states that he is still bored, but seeing progress. Subjective mood improvement reported.  Patient is participating in groups and is seen walking around the unit.       Plan  Biological -      - continue zoloft 50 mg daily for depression - further titration as tolerated, beneficial  - continue lithium 750 mg ER, titrate as tolerated for mood disorder              Monitoring for lithium:   - next level due Monday 8/4 and is ordered for then              - Lithium level 7/31 while on Lithium er 600 mg = 0.56  - renal function, thyroid function within normal limits, reviewed EKG of may 19 2025 - no conduction anomaly noted  -consider addition of seroquel once lithium at steady state as mood adjunct     Literature supports lithium combination therapy - utilizing ssri as combo agent in lieu of antipsychotic seroquel but may add seroquel  adjunctively depending on response to lithium + zoloft.  Avoiding previously used snri effexor to minimize potential for switching.  Consider ECT - not available in this location.        2. Psychological -      Patient is encouraged to participate with the therapeutic milieu and program and group therapy        3. Sociological -      Patient encouraged to cooperate with social work staff on issues relevant to discharge planning     4. Patient discussion -      Plan discussed with patient who concurs with the assessment, treatment plan, and with the following informed consent for medication:   The benefits, risks, most common side effects, possible consequences of not taking the medication, and alternatives to the recommended treatment has been discussed at length.            Total time spent with patient encounter 25 minutes. This includes patient interview, assessment, extensive chart review, personal review of labs and images as noted above, and formulation of recommendations.     KOKI Jeffries-CNP, AGPCNP-BC, PMHNP-BC  Psychiatry, [facility]  1* contact: [employer] preferred

## 2025-08-03 VITALS
TEMPERATURE: 97.7 F | HEIGHT: 69 IN | DIASTOLIC BLOOD PRESSURE: 60 MMHG | HEART RATE: 54 BPM | WEIGHT: 175 LBS | SYSTOLIC BLOOD PRESSURE: 115 MMHG | BODY MASS INDEX: 25.92 KG/M2 | OXYGEN SATURATION: 98 % | RESPIRATION RATE: 16 BRPM

## 2025-08-03 PROCEDURE — 2500000002 HC RX 250 W HCPCS SELF ADMINISTERED DRUGS (ALT 637 FOR MEDICARE OP, ALT 636 FOR OP/ED): Performed by: INTERNAL MEDICINE

## 2025-08-03 PROCEDURE — 1140000001 HC PRIVATE PSYCH ROOM DAILY

## 2025-08-03 PROCEDURE — 2500000001 HC RX 250 WO HCPCS SELF ADMINISTERED DRUGS (ALT 637 FOR MEDICARE OP): Performed by: INTERNAL MEDICINE

## 2025-08-03 PROCEDURE — 2500000002 HC RX 250 W HCPCS SELF ADMINISTERED DRUGS (ALT 637 FOR MEDICARE OP, ALT 636 FOR OP/ED): Performed by: PSYCHIATRY & NEUROLOGY

## 2025-08-03 PROCEDURE — 99231 SBSQ HOSP IP/OBS SF/LOW 25: CPT | Performed by: NURSE PRACTITIONER

## 2025-08-03 PROCEDURE — 2500000001 HC RX 250 WO HCPCS SELF ADMINISTERED DRUGS (ALT 637 FOR MEDICARE OP)

## 2025-08-03 RX ADMIN — LITHIUM CARBONATE 750 MG: 450 TABLET, EXTENDED RELEASE ORAL at 20:54

## 2025-08-03 RX ADMIN — SERTRALINE 50 MG: 50 TABLET, FILM COATED ORAL at 08:18

## 2025-08-03 RX ADMIN — TAMSULOSIN HYDROCHLORIDE 0.4 MG: 0.4 CAPSULE ORAL at 08:18

## 2025-08-03 RX ADMIN — FOLIC ACID 1 MG: 1 TABLET ORAL at 08:18

## 2025-08-03 RX ADMIN — AMLODIPINE BESYLATE 5 MG: 5 TABLET ORAL at 08:18

## 2025-08-03 ASSESSMENT — PAIN SCALES - GENERAL: PAINLEVEL_OUTOF10: 0 - NO PAIN

## 2025-08-03 ASSESSMENT — PAIN - FUNCTIONAL ASSESSMENT: PAIN_FUNCTIONAL_ASSESSMENT: 0-10

## 2025-08-03 NOTE — GROUP NOTE
Group Topic: Team Building   Group Date: 8/3/2025  Start Time: 1330  End Time: 1430  Facilitators: PHILLIP FinneyS   Department: [facility] REHABTH VIRTUAL    Number of Participants: 7   Group Focus: communication, impulsivity, leisure skills, self-esteem, and social skills  Treatment Modality: Leisure Development and Other: Recreation Therapy   Interventions utilized were group exercise, leisure development, and mental fitness  Purpose: Patients engaged in group “Guess It” game. In this therapeutic group patients play a game which is developed to focus on listening skills, workings together as a team, following directions, promote self-esteem and increase appropriate social interactions    Name: Roman Ellis YOB: 1975   MR: [id number]      Facilitator: Recreational Therapist  Level of Participation: active  Quality of Participation: appropriate/pleasant, attentive, cooperative, and engaged  Interactions with others: appropriate  Mood/Affect: appropriate and brightens with interaction  Cognition: coherent/clear  Progress: Gaining insight or knowledge  Comments: Patient appeared reserved at the beginning but demonstrated increased engagement, excitement and confidence. Pt became more vocal and engaged as interactions went on. Smiles often.   Plan: continue with services

## 2025-08-03 NOTE — CARE PLAN
The patient's goals for the shift include No falls    The clinical goals for the shift include maintain safety    Over the shift, the patient did make progress toward the following goals.   Patient ambulates independently

## 2025-08-03 NOTE — GROUP NOTE
"Group Topic: Goals   Group Date: 8/3/2025  Start Time: 0730  End Time: 0800  Facilitators: Denisse Castillo   Department: [facility]    Number of Participants: 12   Group Focus: check in and goals  Treatment Modality: Other: Morning check in and goal setting  Interventions utilized were assignment  Purpose: feelings and other: Daily goal setting    Name: Roman Ellis YOB: 1975   MR: [id number]      Facilitator: [facility]  Level of Participation: moderate  Quality of Participation: appropriate/pleasant, attentive, cooperative, and engaged  Interactions with others: appropriate  Mood/Affect: appropriate  Cognition: coherent/clear  Progress: Moderate  Comments: Patient problem is bipolar. Patient was active in goals group. Patient stated he was \"feeling better\" and got a good night sleep.  Plan: continue with services      " Patient ID: Antoni Wright is a 63 y.o. male.    Chief Complaint: Follow-up (6 month f/u, labs done 4/10)      HPI:   Patient presents here today for above reason.     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  Labs actually look great he is on testosterone supplement.  His H&H is stable PSA is normal blood pressures are well-controlled with current therapy.  Otherwise doing well had a colonoscopy just a couple months ago no complaints at this time here for follow-up.    The patient's Health Maintenance was reviewed and the following appears to be due at this time:   Health Maintenance Due   Topic Date Due    Hepatitis C Screening  Never done    HIV Screening  Never done    TETANUS VACCINE  Never done    Pneumococcal Vaccines (Age 50+) (1 of 1 - PCV) Never done    RSV Vaccine (Age 60+ and Pregnant patients) (1 - Risk 60-74 years 1-dose series) Never done    COVID-19 Vaccine (3 - 2024-25 season) 09/01/2024        Past Medical History:  Past Medical History:   Diagnosis Date    Age-related nuclear cataract, bilateral 10/11/2024    GERD (gastroesophageal reflux disease) 10/11/2024    Hypertension     Mixed hyperlipidemia 10/11/2024    Myalgia due to statin 10/11/2024    Personal history of colonic polyps 11/09/2021    Dr Adonay Velez    Primary hypertension 10/11/2024    Primary insomnia 10/11/2024     Past Surgical History:   Procedure Laterality Date    COLONOSCOPY  11/09/2021    Dr Adonay Velez    FACIAL COSMETIC SURGERY      KNEE SURGERY      SINUS SURGERY       Review of patient's allergies indicates:  No Known Allergies  Medications Ordered Prior to Encounter[1]  Social History[2]  Family History   Problem Relation Name Age of Onset    No Known Problems Mother      Other (Deterioration of brain) Father      No Known Problems Sister      No Known Problems Brother         ROS:   Comprehensive review of systems was performed and is negative except as  "noted above    Vitals/PE:   /72 (BP Location: Left arm, Patient Position: Sitting)   Pulse 93   Resp 16   Ht 5' 7" (1.702 m)   Wt 99.8 kg (220 lb)   SpO2 95%   BMI 34.46 kg/m²   Physical Exam    General: Alert and oriented, No acute distress.   Eye: Normal conjunctiva without exudate.  HENMT: Normocephalic/AT, Normal hearing, Oral mucosa is moist and pink   Neck: No goiter visualized.   Respiratory: Lungs CTAB, Respirations are non-labored, Breath sounds are equal, Symmetrical chest wall expansion.  Cardiovascular: Normal rate, Regular rhythm, No murmur, No edema.   Gastrointestinal: Non-distended.   Genitourinary: Deferred.  Musculoskeletal: Normal ROM, Normal gait, No deformities or amputations.  Integumentary: Warm, Dry, Intact. No diaphoresis, or flushing.  Neurologic: No focal deficits, Cranial Nerves II-XII are grossly intact.   Psychiatric: Cooperative, Appropriate mood & affect, Normal judgment, Non-suicidal.    Assessment/Plan:       1. Primary hypertension    2. Mixed hyperlipidemia    3. Myalgia due to statin    4. Wellness examination    5. Statin myopathy         Plan:   1.  Blood pressure is well-controlled   Cholesterol is well-controlled on Repatha   Screening is up-to-date continue current therapy no complaints labs look great.    Education and counseling done face to face regarding medical conditions and plan. Contact office if new symptoms develop. Should any symptoms ever significantly worsen seek emergency medical attention/go to ER. Follow up at least yearly for wellness or sooner PRN. Nurse to call patient with any results. The patient is receptive, expresses understanding and is agreeable to plan. All questions have been answered.    No follow-ups on file.             [1]   Current Outpatient Medications on File Prior to Visit   Medication Sig Dispense Refill    anastrozole (ARIMIDEX) 1 mg Tab Take 1 mg by mouth every 7 days.      evolocumab (REPATHA SURECLICK) 140 mg/mL PnIj    "    hydroCHLOROthiazide (HYDRODIURIL) 25 MG tablet       omeprazole (PRILOSEC) 40 MG capsule Take 40 mg by mouth 2 (two) times daily.      sildenafiL (VIAGRA) 100 MG tablet Take 100 mg by mouth.      tadalafiL (CIALIS) 5 MG tablet Take 5 mg by mouth.      temazepam (RESTORIL) 30 mg capsule TAKE 1 CAPSULE BY MOUTH EVERY EVENING 30 capsule 2    testosterone cypionate (DEPOTESTOTERONE CYPIONATE) 200 mg/mL injection SMARTSI.75 Milliliter(s) IM Once a Week      valsartan (DIOVAN) 160 MG tablet Take 160 mg by mouth.      [DISCONTINUED] famotidine (PEPCID) 20 MG tablet Take 20 mg by mouth every evening. (Patient not taking: Reported on 2025)       No current facility-administered medications on file prior to visit.   [2]   Social History  Socioeconomic History    Marital status:    Tobacco Use    Smoking status: Never    Smokeless tobacco: Never   Substance and Sexual Activity    Alcohol use: Yes     Alcohol/week: 14.0 standard drinks of alcohol     Types: 7 Cans of beer, 7 Shots of liquor per week    Drug use: Never    Sexual activity: Yes     Partners: Female     Social Drivers of Health     Financial Resource Strain: Low Risk  (2025)    Overall Financial Resource Strain (CARDIA)     Difficulty of Paying Living Expenses: Not hard at all   Food Insecurity: No Food Insecurity (2025)    Hunger Vital Sign     Worried About Running Out of Food in the Last Year: Never true     Ran Out of Food in the Last Year: Never true   Transportation Needs: No Transportation Needs (2025)    PRAPARE - Transportation     Lack of Transportation (Medical): No     Lack of Transportation (Non-Medical): No   Physical Activity: Insufficiently Active (2025)    Exercise Vital Sign     Days of Exercise per Week: 1 day     Minutes of Exercise per Session: 10 min   Stress: No Stress Concern Present (2025)    [employer] - Occupational Stress Questionnaire     Feeling of Stress : Not at  all   Housing Stability: Low Risk  (4/14/2025)    Housing Stability Vital Sign     Unable to Pay for Housing in the Last Year: No     Number of Times Moved in the Last Year: 0     Homeless in the Last Year: No

## 2025-08-03 NOTE — PROGRESS NOTES
"Roman Ellis is a 50 y.o. male on day 7 of admission presenting with Severe depressed bipolar I disorder without psychotic features (Multi).      Subjective   Chart reviewed and case discussed with nursing.    No overnight concerns from staff. Participating w/groups however will keep to his room at other times. PO and sleep maintained. Looks forward to discharge.         Objective     Last Recorded Vitals  Blood pressure 119/74, pulse 50, temperature 36.6 °C (97.9 °F), temperature source Temporal, resp. rate 16, height 1.753 m (5' 9\"), weight 79.4 kg (175 lb), SpO2 99%.    Sleep Log  Estimated \"Sleeping\" Durations   Night Est. Hours   07/27 11.00-12.00   07/28 10.00-10.25   07/29 10.50-11.25   07/30 10.75-11.75   07/31 11.00-11.75   08/01 13.75-14.00   08/02 10.50-11.25        Review of Systems    Psychiatric ROS - Adult  Anxiety: Negative  Depression: anhedonia  Delirium: negative  Psychosis: negative. Denies AVH.  Pearl: negative  Safety Issues: Denies SI/HI when asked.  Psychiatric ROS Comment: n/a    Physical Exam  Constitutional:       Appearance: Normal appearance.   HENT:      Head: Normocephalic and atraumatic.     Eyes:      Comments:     Pulmonary:      Comments: Without increased respiratory effort    Neurological:      General: No focal deficit present.      Mental Status: He is alert.      Comments: Seen to be ambulating appropriately on unit     Mental Status Examination  General Appearance: Laying in bed, beard, tattoo on inner right arm.  Gait/Station: ambulating appropriately  Speech: appropriate rate, volume, prosody  Mood: \"better than I was before\"  Affect: Flat and constricted but reactive, congruent with mood.   Thought Process: Linear, goal directed  Thought Content: mood congruent, focusing on his boredom on the unit  Perception: No perceptual abnormalities noted  Level of Consciousness: Alert  Orientation: Alert and oriented to person, place, time and situation as evidenced by being aware " Physical Therapy Evaluation    Visit Type: Initial Evaluation  Visit: 1  Referring Provider: Mike Hansen CNP  Medical Diagnosis (from order): M76.31 - Iliotibial band syndrome of right side  N18.2 - Stage 2 chronic kidney disease   Treatment Diagnosis: right hip, lumbar - impaired strength, impaired range of motion, impaired joint play/mobility, increased pain/symptoms, impaired posture, impaired tissue mobility, impaired muscle length/flexibility and impaired activity tolerance.  Chart reviewed at time of initial evaluation (relevant co-morbidities, allergies, tests and medications listed):   Patient Active Problem List:     Type 2 diabetes mellitus without complication, without long-term current use of insulin  (CMD)     Right inguinal hernia     Left renal mass     Iliotibial band syndrome of right side     Stage 2 chronic kidney disease          SUBJECTIVE                                                                                                               The pain started after picking up a  and a day or 2 later the pain started.  Pain states in the right posterior hip that will radiate down the leg.      Pain / Symptoms  - Pain rating (out of 10): Current: 2 ; Best: 0; Worst: 6  - Location: Right outside of the leg   - Quality / Description: sore, ache  - Alleviating Factors: over-the-counter medication    Function:   Limitations / Exacerbation Factors:   - sleep disturbed  - Prolonged sitting, standing and riding in cars produce pain.   Prior Level of Function: declining function, therefore referred to therapy,    Patient Goals: decreased pain. To learn exercises to help improve my pain.     Prior treatment  - no therapies  - Discharged from hospital, home health, or skilled nursing facility in last 30 days: no  Home Environment   - Patient lives with: significant other  - Type of home: multiple level home  - Assistance available: consistent  - Denies 2 or more falls or an unexplained  fall with injury in the last year.  - Feel safe at home / work / school: yes      OBJECTIVE                                                                                                                     Range of Motion (ROM)   (degrees unless noted; active unless noted; norms in ( ); negative=lacking to 0, positive=beyond 0)  Hip:   - Internal Rotation (45-50):     - in sitting:         Left:  40          Right:  40    - External Rotation (45-50):     - in sitting:         Left:  45           Right:  50    Lumbar:    - Flexion (60-80):  70°  pain (right side)     - Extension (25):  30°  pain (pain on the right)     - Rotation (30-45):         Left:  50%          Right:  50%     - Side Bend (25-35):         Left:  20°  pain (pain stretching on the right side)          Right:  20°     Strength  (out of 5 unless noted, standard test position unless noted)   Hip:    - Flexion:         Left: 5         Right: 4+, pain    - Adduction:         Right: 4+, pain    - Internal Rotation:         Left: 5         Right: 4+, pain    - External Rotation:         Left: 5  Knee:    - Flexion:         Left: 5         Right: 5    - Extension:         Left: 5         Right: 5         Palpation  Right  - Gluteus Medius: tenderness  - Piriformis: tenderness  - Iliotibial Band: tenderness and trigger point    Muscle Length Tests  - 90/90 Hamstring at knee:  - Left:  50° - Right: 60°    Special Tests  Lumbar: Nerve Tests  - Straight Leg Raise:  Right: positive  - Slump Test: Right: positive                Outcome/Assessments  Outcome Measures:   OSWESTRY Total Scored: 13  OSWESTRY Total Possible Score: 45  OSWESTRY Score Calculated: 28.89 %  (0-20% = minimal disability; 20-40% = moderate disability; 40-60% = severe disability; 60-80% = crippled; % = bed bound) see flowsheet for additional documentation        Treatment     Therapeutic Exercise  Seated piriformis stretch (no hip hinge yet) 2x30 seconds  Seated hamstring stretch  of his hospitalization, time of day, and self  Attention and Concentration: appears to be intact due to ability to have full conversation without need to redirect patient  Insight: Fair, in regards to understanding mental health condition, expressing frustration about slow progress. However verbalizes acknowledging improvements and gains during this admission  Judgment: Fair, hoping the medications would work more quickly than they do, but expressing agreement with the plan and openness to the process of hospitalization      Psychiatric Risk Assessment  Violence Risk Assessment: acces to weapons, major mental illness, male,  history or weapons training, presence of firearms in home, substance abuse, and unemployment  Acute Risk of Harm to Others is Considered: low   Suicide Risk Assessment: access to weapons, , current psychiatric illness, feelings of hopelessness, history of trauma or abuse, male, presence of firearms in home, severe anxiety, substance abuse, and suicidal ideations  Protective Factors against Suicide: adherence to  treatment, child-related concerns/living with children at home < 18 yrs, marriage/partnership, and positive family relationships  Acute Risk of Harm to Self is Considered: moderate    Relevant Results  Results for orders placed or performed during the hospital encounter of 07/27/25 (from the past 96 hours)   Lithium level   Result Value Ref Range    Lithium 0.56 (L) 0.60 - 1.20 mmol/L         Assessment & Plan  Pure hypercholesterolemia    Hypertension    Tobacco use disorder    Severe depressed bipolar I disorder without psychotic features (Multi)    Bipolar depression f31.4     Roman Ellis is a 50 y.o. male with a past medical history of recently diagnosed bipolar disorder, past history of anxiety, depression, and substance use disorder here for SI and worsening depressive symptoms.      He has has multiple recent admissions complicated by drug and alcohol use,  2x30 seconds  Supine single knee to opposite shoulder 2x30 seconds  Supine piriformis stretch 2x30 seconds   Next: recumbent bike 5 minutes  Stair case hamstring stretch   Dynamic hamstring stretch      Manual Therapy   Gentle release to right Iliotibial band and gluteals in side lying     Neuromuscular Re-Education  Side lying clams with core engagement x10  Side lying hip abduction with core engagement x10  Bridge x10    Next: tandem balance  Leg fallout green band with core engagement   Core march   Squats       Skilled input: verbal instruction/cues, tactile instruction/cues, posture correction, facilitation, inhibition, as detailed above and demonstration    Writer verbally educated and received verbal consent for hand placement, positioning of patient, and techniques to be performed today from patient for clothing adjustments for techniques, therapist position for techniques, modality application and hand placement and palpation for techniques as described above and how they are pertinent to the patient's plan of care.      ASSESSMENT                                                                                                          78 year old patient has reported functional limitations listed above impacted by signs and symptoms consistent with treatment diagnosis below.  Treatment Diagnosis:   - Involved: right hip, lumbar.  - Symptoms/impairments: impaired strength, impaired range of motion, impaired joint play/mobility, increased pain/symptoms, impaired posture, impaired tissue mobility, impaired muscle length/flexibility and impaired activity tolerance.    Maldonado presents to physical therapy with right lower extremity and lumbar pain that began after lifting a .  Patient found the targeted stretches and gentle strengthening helpful and demonstrated understanding in clinic.  Plan to progress program in upcoming sessions to assist Maldonado with reducing his pain and improving his level of  however most recently with a diagnosis of bipolar disorder. Per patient, he has been feeling increasingly depressed over the past month, much worse over the past two weeks. It is likely that his current clinical picture is an acute depressive episode in the context of his bipolar disorder. He will require medication adjustments to best address his current clinical picture. He otherwise denies current suicidality, but exhibited concerning symptoms for suicidality outpatient and remains at moderate to high risk of suicide outpatient without additional treatment. He requires inpatient admission for medication adjustments and close clinical monitoring.      SBIRT - Patient was screened for substances of abuse and had a positive screen.  Brief intervention then held with patient discussing the impact of substance abuse/use disorder on bio/psycho/social functioning and patient's readiness for change.  On discharge, patient will be referred to treatment for substance use disorder.     8/2- patient reports feeling better than prior to admission. He states that he is still bored, but seeing progress. Subjective mood improvement reported.  Patient is participating in groups and is seen walking around the unit.       Plan  Biological -      - continue zoloft 50 mg daily for depression - further titration as tolerated, beneficial  - continue lithium 750 mg ER, titrate as tolerated for mood disorder              Monitoring for lithium:   - next level due Monday 8/4 and is ordered for then              - Lithium level 7/31 while on Lithium er 600 mg = 0.56  - renal function, thyroid function within normal limits, reviewed EKG of may 19 2025 - no conduction anomaly noted  - consider addition of seroquel once lithium at steady state as mood adjunct     Literature supports lithium combination therapy - utilizing ssri as combo agent in lieu of antipsychotic seroquel but may add seroquel adjunctively depending on response to lithium +  function.     Prognosis: patient will benefit from skilled therapy  Rehabilitative: good.  Predicted patient presentation: Moderate (evolving) - Patient comorbidities and complexities, as defined above, may have varying impact on steady progress for prescribed plan of care.  Education:   - Present and ready to learn: patient  - Results of above outlined education: Verbalizes understanding and Demonstrates understanding    PLAN                                                                                                                         The following skilled interventions to be implemented to achieve goals listed below:  Neuromuscular Re-Education (12930)  Therapeutic Exercise (85191)  Therapeutic Activity (30910)  Manual Therapy (47310)  Electrical Stimulation Unattended (78376 or )  Heat/Cold (84347)  Electrical Stimulation Attended (40083)    Frequency / Duration  2 times per week tapering as patient progresses for 8 weeks for an estimated total of 8 visits    Patient involved in and agreed to plan of care and goals.    Suggestions for next session as indicated: Progress per plan of care    Goals  Long Term Goals: to be met by end of plan of care  1. Patient will report tolerating sitting for 45 minutes with patient reported manageable/tolerable difficulty for watching a program/movie with friends/family (interpersonal relationships) and assist with riding in the car to help with caring for his grandchildren.   2. Patient will report tolerating standing for 45 minutes without reported difficulty for completion of household tasks such as cooking.  3. Oswestry: Patient will score 16.89% or lower on The Oswestry Disability Index to indicate a decreased level of impairment related to back or leg symptoms. (minimal clinically important difference: 12% of calculated score)  4. Patient will be independent with progressed and modified home exercise program.      Therapy procedure time and total treatment  zoloft.  Avoiding previously used snri effexor to minimize potential for switching.  Consider ECT - not available in this location.        2. Psychological -      Patient is encouraged to participate with the therapeutic milieu and program and group therapy        3. Sociological -      Patient encouraged to cooperate with social work staff on issues relevant to discharge planning     4. Patient discussion -      Plan discussed with patient who concurs with the assessment, treatment plan, and with the following informed consent for medication:   The benefits, risks, most common side effects, possible consequences of not taking the medication, and alternatives to the recommended treatment has been discussed at length.            Total time spent with patient encounter 25 minutes. This includes patient interview, assessment, extensive chart review, personal review of labs and images as noted above, and formulation of recommendations.     KOKI Jeffries-CNP, AGPCNP-BC, PMHNP-BC  Psychiatry, [facility]  1* contact: [employer] preferred     time can be found documented on the Time Entry flowsheet

## 2025-08-03 NOTE — NURSING NOTE
Assumed care of patient. Patient here for SI and increased depression. Recently diagnosed with bipolar disorder after cannabis induced bobo. There was a period of starting new meds and adjusting old meds. Patient then became depressed and felt he was no longer to care for himself. Patient cooperative but isolative. Eats 100% of meals. Denies SI. Will continue to monitor.

## 2025-08-03 NOTE — NURSING NOTE
VANNESSA NOTE     Problem:  Depression     Behavior:  Patient is in patient’s room laying on the bed awake. Patient is pleasant and calm. Patient’s affect is flat. Patient is a little talkative. Patient maintains good eye contact.    Group Participation: N/A  Appetite/Meals: N/A       Interventions:  This nurse completed a shift assessment and administered patient's scheduled night time medication.    Response:  Patient remained pleasant and calm and was cooperative throughout this shift assessment and medication administration.     Plan:  Continue to monitor for depression. Continue to monitor for patient safety.

## 2025-08-03 NOTE — CARE PLAN
The patient's goals for the shift include No falls    The clinical goals for the shift include maintain safety    Over the shift, the patient is in the process of making progress toward the goals of Maintain Safety and No falls. Barriers to progression include Lack of understanding. Recommendations to address these barriers include Education and Safety rounds.

## 2025-08-03 NOTE — GROUP NOTE
"Group Topic: Excercise/Physical    Group Date: 8/3/2025  Start Time: 1015  End Time: 1130  Facilitators: Dioni CHAN CTRS   Department: [facility] REHABTH VIRTUAL    Number of Participants: 9   Group Focus: communication, self-esteem, and social skills  Treatment Modality: Leisure Development and Other: Recreation Therapy  Interventions utilized were exploration, group exercise, leisure development, reminiscence, and support  Purpose: In this group patients were prompted to engage together in a therapeutic activity which aims to promote increased socialization, motivation, physical activity level, communication and following directions along utilizing balance, ROM, motor skills and increase physical movement. CTRS integrated music to this activity to enhance mood and promote memory stimulation.    Name: Roman Ellis YOB: 1975   MR: [id number]      Facilitator: Recreational Therapist  Level of Participation: active  Quality of Participation: appropriate/pleasant, attentive, cooperative, and engaged  Interactions with others: appropriate  Mood/Affect: appropriate and brightens with interaction  Cognition: coherent/clear  Progress: Gaining insight or knowledge  Comments: demonstrates appropriate behaviors. Increased engagement with peers. Brighter affect at times. Shares with group his proudest accomplish ment is \"being the father to his two boys\".   Plan: continue with services      "

## 2025-08-03 NOTE — GROUP NOTE
Group Topic: Goals   Group Date: 8/2/2025  Start Time: 2004  End Time: 2020  Facilitators: Valeria Boone   Department: [facility] Geriatric     Number of Participants: 9   Group Focus: goals  Treatment Modality: Other: PCA  Interventions utilized were reminiscence  Purpose: feelings and communication skills    Name: Roman Ellis YOB: 1975   MR: [id number]      Facilitator: Mental Health PCNA  Level of Participation: did not attend  Quality of Participation: did not attend  Interactions with others: did not attend  Mood/Affect: did not attend  Triggers (if applicable): N/A  Cognition: did not attend  Progress: Other  Comments: Pt problem is depression. Pt declined the invitation to attend group.   Plan: continue with services

## 2025-08-04 LAB — LITHIUM SERPL-SCNC: 0.61 MMOL/L (ref 0.6–1.2)

## 2025-08-04 PROCEDURE — 2500000001 HC RX 250 WO HCPCS SELF ADMINISTERED DRUGS (ALT 637 FOR MEDICARE OP): Performed by: INTERNAL MEDICINE

## 2025-08-04 PROCEDURE — 2500000002 HC RX 250 W HCPCS SELF ADMINISTERED DRUGS (ALT 637 FOR MEDICARE OP, ALT 636 FOR OP/ED): Performed by: INTERNAL MEDICINE

## 2025-08-04 PROCEDURE — 1140000001 HC PRIVATE PSYCH ROOM DAILY

## 2025-08-04 PROCEDURE — 36415 COLL VENOUS BLD VENIPUNCTURE: CPT | Performed by: PSYCHIATRY & NEUROLOGY

## 2025-08-04 PROCEDURE — 99232 SBSQ HOSP IP/OBS MODERATE 35: CPT | Performed by: INTERNAL MEDICINE

## 2025-08-04 PROCEDURE — 2500000001 HC RX 250 WO HCPCS SELF ADMINISTERED DRUGS (ALT 637 FOR MEDICARE OP): Performed by: PSYCHIATRY & NEUROLOGY

## 2025-08-04 PROCEDURE — 2500000002 HC RX 250 W HCPCS SELF ADMINISTERED DRUGS (ALT 637 FOR MEDICARE OP, ALT 636 FOR OP/ED): Performed by: PSYCHIATRY & NEUROLOGY

## 2025-08-04 PROCEDURE — 80178 ASSAY OF LITHIUM: CPT | Performed by: PSYCHIATRY & NEUROLOGY

## 2025-08-04 PROCEDURE — 99232 SBSQ HOSP IP/OBS MODERATE 35: CPT | Performed by: PSYCHIATRY & NEUROLOGY

## 2025-08-04 RX ORDER — LITHIUM CARBONATE 450 MG/1
900 TABLET ORAL NIGHTLY
Status: DISCONTINUED | OUTPATIENT
Start: 2025-08-04 | End: 2025-08-07 | Stop reason: HOSPADM

## 2025-08-04 RX ADMIN — TAMSULOSIN HYDROCHLORIDE 0.4 MG: 0.4 CAPSULE ORAL at 08:05

## 2025-08-04 RX ADMIN — LITHIUM CARBONATE 900 MG: 450 TABLET, EXTENDED RELEASE ORAL at 20:23

## 2025-08-04 RX ADMIN — AMLODIPINE BESYLATE 5 MG: 5 TABLET ORAL at 08:04

## 2025-08-04 RX ADMIN — SERTRALINE 50 MG: 50 TABLET, FILM COATED ORAL at 08:05

## 2025-08-04 RX ADMIN — FOLIC ACID 1 MG: 1 TABLET ORAL at 08:04

## 2025-08-04 ASSESSMENT — PAIN - FUNCTIONAL ASSESSMENT
PAIN_FUNCTIONAL_ASSESSMENT: 0-10
PAIN_FUNCTIONAL_ASSESSMENT: 0-10

## 2025-08-04 ASSESSMENT — PAIN SCALES - GENERAL
PAINLEVEL_OUTOF10: 0 - NO PAIN
PAINLEVEL_OUTOF10: 0 - NO PAIN

## 2025-08-04 NOTE — PROGRESS NOTES
"Roman Ellis is a 50 y.o. male on day 8 of admission presenting with Severe depressed bipolar I disorder without psychotic features (Multi).      Subjective   Chart reviewed and case discussed with multidisciplinary treatment team.     Per chart review, pt has been attending group and shows increased engagement with peers.       Talked to pt today and he presented as more engaged. He states that he is more hopeful and not as \"bummed out\" by the prospect of taking pills\" to help his condition. When asked about his job that he was supposed to start today, he stated that he understands why he is still here and is optimistic to find another job when he is eventually discharged. He is currently reading [place] to pass the time. He is sleeping fairly well, but has trouble sometimes because he can hear the other pts that are in rooms adjacent to his. He also states that he is sleeping a bit more than usual but attributes that to medications. In terms of appetite, he feels that his appetite is slowly coming back and eating doesn't feel like a chore anymore. He also complains of dry skin around his chin and chest area, wondered if it might be a side effect of the medications. He denies hallucinations, racing and bothering thoughts, and pain. He also states that is less anxious as he previously stated that he was anxious about being away from his family to care and provide for them. When questioned about his access to firearms, he claims that his brother-in-law has the gun now and is not accessible to him anymore.    On provider approach in the afternoon, patient was found in bed reading a book.  He and allowed for provider to enter his room and discuss.  Provider shared that the lithium level returned slightly above the minimum recommended for therapeutic dosing.  We discussed titrating lithium and Zoloft - patient consented.  Patient is presenting as more optimistic and hopeful with increased affect mobilization " "during encounter, and observed with increased demonstration of daytime activity.  Phone call discussion between this provider and wife yielding her opinion that he does appear to be showing some improvements.  We will set a family meeting for Wednesday afternoon after which we may consider discharge.  In direct discussion with patient regarding gun manipulation prior to admission, patient continues to deny that this was a suicidal act and describes an intrusive thought that he had not cleaned his weapons in a prolonged period of time and at that moment he wanted to do so.  There is some concern for minimization related to gun manipulation thus far.  Guns have been confirmed by wife's removed from the home as of last Wednesday evening.         Objective     Last Recorded Vitals  Blood pressure 120/80, pulse 55, temperature 36.6 °C (97.9 °F), temperature source Temporal, resp. rate 16, height 1.753 m (5' 9\"), weight 79.4 kg (175 lb), SpO2 99%.    Sleep Log  Estimated \"Sleeping\" Durations   Night Est. Hours   07/27 11.00-12.00   07/28 10.00-10.25   07/29 10.50-11.25   07/30 10.75-11.75   07/31 11.00-11.75   08/01 13.75-14.00   08/02 10.50-11.25   08/03 10.50-10.75        Review of Systems    Psychiatric ROS - Adult  Anxiety: Negative  Depression: anhedonia  Delirium: negative  Psychosis: negative  Pearl: negative  Safety Issues: Denies SI/ AVH  Psychiatric ROS Comment: n/a    Physical Exam  Constitutional:       Appearance: Normal appearance.   HENT:      Head: Normocephalic and atraumatic.     Eyes:      Comments: Conjunctiva non-erythematous, non-icteric   Pulmonary:      Comments: Without increased respiratory effort    Neurological:      General: No focal deficit present.      Mental Status: He is alert.      Comments: Seen to be ambulating appropriately on unit     Mental Status Examination  General Appearance: Laying in bed, beard, tattoo on inner right arm.  Gait/Station: ambulating independently without " "assistance required  Speech: conversational volume, overall with minimal spontaneous production  Mood: \"better than earlier this week\"  Affect: Constricted and  increasingly reactive, congruent with mood.   Thought Process: Linear, goal directed  Thought Content: mood congruent, focusing on his boredom on the unit minimally contributed content related to gains thus far until directly asked  Perception: No perceptual abnormalities noted  Level of Consciousness: Alert  Orientation: Alert and oriented to person, place, time and situation as evidenced by being aware of his hospitalization, time of day, and self. Understood it is Monday and today was when he was supposed to start his job  Attention and Concentration: appears to be intact due to ability to have full conversation without need to redirect patient  Insight: Fair, in regards to understanding mental health condition; Feels that his condition is manageable given the adherence to treatment plan  Judgment: Fair, expresses agreement with the plan and openness to the process of hospitalization; Understands the continuity of his diagnosis and willingness to continue medications past his discharge       Psychiatric Risk Assessment  Violence Risk Assessment: acces to weapons, major mental illness, male,  history or weapons training, presence of firearms in home, substance abuse, and unemployment  Acute Risk of Harm to Others is Considered: low   Suicide Risk Assessment: access to weapons, , current psychiatric illness, feelings of hopelessness, history of trauma or abuse, male, presence of firearms in home, severe anxiety, substance abuse, and suicidal ideations  Protective Factors against Suicide: adherence to  treatment, child-related concerns/living with children at home < 18 yrs, marriage/partnership, and positive family relationships  Acute Risk of Harm to Self is Considered: moderate    Relevant Results  No results found for this or any previous " visit (from the past 96 hours).        Assessment & Plan  Pure hypercholesterolemia    Hypertension    Tobacco use disorder    Severe depressed bipolar I disorder without psychotic features (Multi)    Bipolar depression f31.4     Roman Ellis is a 50 y.o. male with a past medical history of recently diagnosed bipolar disorder, past history of anxiety, depression, and substance use disorder here for SI and worsening depressive symptoms.      He has has multiple recent admissions complicated by drug and alcohol use, however most recently with a diagnosis of bipolar disorder. Per patient, he has been feeling increasingly depressed over the past month, much worse over the past two weeks. It is likely that his current clinical picture is an acute depressive episode in the context of his bipolar disorder. He will require medication adjustments to best address his current clinical picture. He otherwise denies current suicidality, but exhibited concerning symptoms for suicidality outpatient and remains at moderate to high risk of suicide outpatient without additional treatment. He requires inpatient admission for medication adjustments and close clinical monitoring.      SBIRT - Patient was screened for substances of abuse and had a positive screen.  Brief intervention then held with patient discussing the impact of substance abuse/use disorder on bio/psycho/social functioning and patient's readiness for change.  On discharge, patient will be referred to treatment for substance use disorder.     8/1- patient reports that he is feeling better today. He states that he is still bored, but seeing progress. Subjective mood improvement reported.  Objective findings not yet tracking however. Patient is participating in groups and is seen walking around the unit.    8/4- patient reports he is feeling more hopeful and slightly less bored. He is optimistic of progress. Patient is more engaged in groups and seen walking around the  unit.      Plan  Biological -      - titrate zoloft to 75 mg daily for depression - further titration as tolerated, beneficial  - titrate lithium to 900 mg ER, titrate as tolerated for mood disorder              Monitoring for lithium:              - Lithium level on er 7500 = 0.61, on Lithium er 600 mg = 0.56  - renal function, thyroid function within normal limits, reviewed EKG of may 19 2025 - no conduction anomaly noted  -consider addition of seroquel once lithium at steady state as mood adjunct     Literature supports lithium combination therapy - utilizing ssri as combo agent in lieu of antipsychotic seroquel but may add seroquel adjunctively depending on response to lithium + zoloft.  Avoiding previously used snri effexor to minimize potential for switching.  Consider ECT - not available in this location.        2. Psychological -      Patient is encouraged to participate with the therapeutic milieu and program and group therapy        3. Sociological -      Patient encouraged to cooperate with social work staff on issues relevant to discharge planning  Family meeting Wednesday afternoon     4. Patient discussion -      Plan discussed with patient who concurs with the assessment, treatment plan, and with the following informed consent for medication:   The benefits, risks, most common side effects, possible consequences of not taking the medication, and alternatives to the recommended treatment has been discussed at length.                          KARENA MOHAN

## 2025-08-04 NOTE — NURSING NOTE
VANNESSA NOTE     Problem:  Depression     Behavior:  Patient is in patient’s room laying on the bed sleeping. Upon being woken up by this nurse, patient is pleasant and calm. Patient’s affect is blunted. Patient is talkative. Patient maintains good eye contact.    Group Participation: N/A  Appetite/Meals: N/A       Interventions:  This nurse completed a shift assessment and administered patient's scheduled night time medications.    Response:  Patient remained pleasant and calm and was cooperative throughout this shift assessment and medication administration.     Plan:  Continue to monitor for depression. Continue to monitor for patient safety.

## 2025-08-04 NOTE — GROUP NOTE
Group Topic: Reminiscence   Group Date: 8/4/2025  Start Time: 1330  End Time: 1430  Facilitators: BOSSMAN Robledo   Department: [facility] REHABTH VIRTUAL    Number of Participants: 9   Group Focus: other Storywise  Treatment Modality: Other: Recreation Therapy  Interventions utilized were exploration, group exercise, reminiscence, and story telling  Purpose: other: fun, elevate mood, increase socialization, stimulate memory    Name: Roman Ellis YOB: 1975   MR: [id number]      Facilitator: Recreational Therapist  Level of Participation: active  Quality of Participation: appropriate/pleasant, attentive, and cooperative  Interactions with others: appropriate and gave feedback  Mood/Affect: appropriate and brightens with interaction  Triggers (if applicable): n/a  Cognition: coherent/clear  Progress: Significant  Comments: pt problem is depressed mood.  Pt continues to display appropriate and active participation.   Plan: continue with services

## 2025-08-04 NOTE — PROGRESS NOTES
LSW met with pt to discuss disposition and discharge plan. Pt reports that he is doing better than the last encounter last week. Pt feels that he is almost back to his usual self. Pt is looking forward to going home but is not discharge focused. Pt is following the treatment plan and wants to get better. Pt does hope to be home before Friday for his son's birthday. Pt will follow up with  Dr. Thornton for psychiatry. Pt has met one time with therapist from [facility] and would be agreeable to returning but is open to other options as well. Pt prefers in person and if possible to fit his work scheduled, which at this time he is off work on Friday's. Pt was pleasant and cooperative and with brighter affect. LSW and provider discussed with pt about having a family meeting with pt's wife prior to discharge. Tentative discharge for Wednesday or Thursday. Pt's wife planning to come for visitation and LSW will follow up at that time.     NIXON RossW

## 2025-08-04 NOTE — GROUP NOTE
Group Topic: Goals   Group Date: 8/3/2025  Start Time: 2010  End Time: 2025  Facilitators: Mandy Vela   Department: [facility] Geriatric     Number of Participants: 11   Group Focus: affirmation and check in  Treatment Modality: Patient-Centered Therapy  Interventions utilized were mental fitness and reminiscence  Purpose: feelings and trigger / craving management    Name: Roman Ellis YOB: 1975   MR: [id number]      Facilitator: Mental Health PCNA  Level of Participation: did not attend  Quality of Participation: did not attend, was invited  Interactions with others: appropriate  Mood/Affect: depressed and flat  Triggers (if applicable): none  Cognition: coherent/clear  Progress: Minimal  Comments: patient presented with sever depressed bipolar 1 disorder  Plan: continue with services

## 2025-08-04 NOTE — GROUP NOTE
Group Topic: Goals   Group Date: 8/4/2025  Start Time: 0730  End Time: 0800  Facilitators: Kyle Ellis   Department: [facility] Geriatric     Number of Participants: 12   Group Focus: goals  Treatment Modality: Patient-Centered Therapy  Interventions utilized were assignment  Purpose: self-care    Name: Roman Ellis YOB: 1975   MR: [id number]      Facilitator: Mental Health PCNA  Level of Participation: moderate  Quality of Participation: appropriate/pleasant  Interactions with others: appropriate  Mood/Affect: appropriate  Triggers (if applicable): n/a  Cognition: coherent/clear  Progress: Moderate  Comments: pt attended group and worked on assignment  Plan: continue with services

## 2025-08-04 NOTE — GROUP NOTE
Group Topic: Medication Education   Group Date: 8/4/2025  Start Time: 1000  End Time: 1100  Facilitators: Mary Andrea PharmD   Department: [facility]    Number of Participants: 11   Group Focus: daily focus and other General Medication Education  Treatment Modality: Patient-Centered Therapy  Interventions utilized were group exercise, other BINGO game, and patient education  Purpose: insight or knowledge and other: improved medication compliance    Name: Roman Ellis YOB: 1975   MR: [id number]      Facilitator: Pharmacist  Level of Participation: active  Quality of Participation: appropriate/pleasant, attentive, cooperative, and engaged  Interactions with others: appropriate  Mood/Affect: appropriate  Triggers (if applicable): n/a  Cognition: coherent/clear  Progress: Gaining insight or knowledge  Comments: Roman Ellis attended the general medication education group today. We played Snappy shuttle.  Roman Ellis participated in the game by covering the topics discussed on the Snappy shuttle board and answering questions when asked of the group as a whole.  Roman Ellis was quiet but seemed fully engaged in group.     Plan: continue with services

## 2025-08-04 NOTE — PROGRESS NOTES
[facility]: [facility]  PROGRESS NOTE      Roman Ellis is a 50 y.o. male that is being seen  today for follow-up [facility]..  Subjective   Patient is being seen for follow-up at [facility].  Patient is on amlodipine and blood pressure has been fairly controlled.  Patient also is on Flomax and denies any urinary complaints.  Patient has been participating in activities.  Denies any suicidal ideations.  Patient is a lab work reviewed.  Patient lithium levels are normal now       ROS  Negative for fever or chills  Negative for sore throat, ear pain, nasal discharge  Negative for cough, shortness of breath or wheezing  Negative for chest pain, palpitations, swelling of legs  Negative for abdominal pain, constipation, diarrhea, blood in the stools  Negative for urinary complaints  Negative for headache, dizziness, weakness or numbness  Negative for joint pain  Positive for depression or anxiety  All other systems reviewed and were negative   Vitals:    08/04/25 0700   BP: 120/80   Pulse: 55   Resp: 16   Temp: 36.6 °C (97.9 °F)   SpO2: 99%      Vitals:    07/27/25 1357   Weight: 79.4 kg (175 lb)     Body mass index is 25.84 kg/m².  Physical Exam  Constitutional: Patient does not appear to be in any acute distress  Head and Face: NCAT  Eyes: Normal external exam, EOMI  ENT: Normal external inspection of ears and nose. Oropharynx normal.  Cardiovascular: RRR, S1/S2, no murmurs, rubs, or gallops, radial pulses +2, no edema of extremities  Pulmonary: CTAB, no respiratory distress.  Abdomen: +BS, soft, non-tender, nondistended, no guarding or rebound, no masses noted  MSK: No joint swelling, normal movements of all extremities. Range of motion- normal.  Skin- No lesions, contusions, or erythema.  Peripheral puslses palpable bilaterally 2+  Neuro: AAO X3, Cranial nerves 2-12 grossly intact,DTR 2+ in all 4 limbs       LABS   [unfilled]  Lab Results   Component Value Date    GLUCOSE 109 (H) 07/26/2025     CALCIUM 9.6 07/26/2025     07/26/2025    K 4.2 07/26/2025    CO2 27 07/26/2025     07/26/2025    BUN 15 07/26/2025    CREATININE 1.06 07/26/2025     Lab Results   Component Value Date    ALT 17 07/26/2025    AST 12 07/26/2025    ALKPHOS 39 07/26/2025    BILITOT 0.6 07/26/2025     Lab Results   Component Value Date    WBC 11.0 07/26/2025    HGB 16.0 07/26/2025    HCT 46.9 07/26/2025    MCV 89 07/26/2025     07/26/2025     Lab Results   Component Value Date    CHOL 164 07/28/2025    CHOL 209 (H) 05/02/2025    CHOL 162 12/14/2024     Lab Results   Component Value Date    HDL 36.1 07/28/2025    HDL 89.0 05/02/2025    HDL 41.0 12/14/2024     Lab Results   Component Value Date    LDLCALC 109 (H) 07/28/2025    LDLCALC 111 (H) 05/02/2025    LDLCALC 105 (H) 12/14/2024     Lab Results   Component Value Date    TRIG 93 07/28/2025    TRIG 45 05/02/2025    TRIG 81 12/14/2024     Lab Results   Component Value Date    HGBA1C 6.3 (H) 12/14/2024     Other labs not included in the list above were reviewed either before or during this encounter.    History    Medical History[1]  Surgical History[2]  Family History[3]  Allergies[4]  Medications Ordered Prior to Encounter[5]  Immunization History   Administered Date(s) Administered    Flu vaccine (IIV4), preservative free *Check age/dose* 10/05/2020, 12/06/2023    Influenza, injectable, quadrivalent 11/14/2018    Influenza, seasonal, injectable 11/16/2012    Ariella SARS-CoV-2 Vaccination 06/06/2021    Tdap vaccine, age 7 year and older (BOOSTRIX, ADACEL) 08/10/2012     Patient's medical history was reviewed and updated either before or during this encounter.  ASSESSMENT / PLAN:  Active Hospital Problems    *Severe depressed bipolar I disorder without psychotic features (Multi)      Tobacco use disorder      Pure hypercholesterolemia      Hypertension       Patient is being seen for follow-up visit at [facility].  Patient has history of hypertension and is on  amlodipine and blood pressure has been fairly controlled.  Will continue with amlodipine.  Patient has been active smoker.  Patient is on nicotine patch.  Patient also has history of benign prostate hyperplasia and is on tamsulosin and symptoms have been well-controlled.  Will continue with the same medications.  Patient lithium levels are normal.  Patient is being seen by geropsych team.      Judah Elder MD                [1]   Past Medical History:  Diagnosis Date    Fever 02/12/2025    History of laparoscopic appendectomy 02/12/2025   [2] History reviewed. No pertinent surgical history.  [3]   Family History  Problem Relation Name Age of Onset    Diabetes Mother      Diabetes Father      Hypertension Father      Stroke Maternal Grandfather     [4] No Known Allergies  [5]   No current facility-administered medications on file prior to encounter.     Current Outpatient Medications on File Prior to Encounter   Medication Sig Dispense Refill    amLODIPine (Norvasc) 5 mg tablet Take 1 tablet (5 mg) by mouth once daily. 90 tablet 1    folic acid (Folvite) 1 mg tablet Take 1 tablet (1 mg) by mouth once daily. 90 tablet 1    gabapentin (Neurontin) 300 mg capsule Take 1 capsule (300 mg) by mouth 3 times a day. 90 capsule 0    lisinopril 10 mg tablet Take 1 tablet (10 mg) by mouth once daily. 90 tablet 1    lurasidone (Latuda) 40 mg tablet Take 1 tablet (40 mg) by mouth once daily in the evening. Take with meals. 30 tablet 1    multivitamin with minerals tablet Take 1 tablet by mouth once daily. 30 tablet 11    naltrexone (Depade) 50 mg tablet Take 1 tablet (50 mg) by mouth once daily at bedtime. 90 tablet 1    propranolol (Inderal) 10 mg tablet Take 2 tablets (20 mg) by mouth 2 times a day. 120 tablet 0    tamsulosin (Flomax) 0.4 mg 24 hr capsule Take 1 capsule (0.4 mg) by mouth once daily. 90 capsule 1    nicotine (Nicoderm CQ) 14 mg/24 hr patch Place 1 patch over 24 hours on the skin once daily. 30 patch 0     nicotine polacrilex (Nicorette) 2 mg gum Chew 1 each (2 mg) every 2 hours if needed for smoking cessation (nicotine craving, urge to smoke). 100 each 0    thiamine (Vitamin B-1) 100 mg tablet Take 1 tablet (100 mg) by mouth once daily. (Patient not taking: Reported on 7/27/2025) 30 tablet 11    venlafaxine XR (Effexor-XR) 37.5 mg 24 hr capsule Take 1 capsule (37.5 mg) by mouth once daily. 7 capsule 0

## 2025-08-04 NOTE — GROUP NOTE
Group Topic: Personal Responsibility   Group Date: 8/4/2025  Start Time: 1110  End Time: 1150  Facilitators: BOSSMAN Robledo   Department: [facility] REHAB[facility] VIRTUAL    Number of Participants: 9   Group Focus: other Accepting Personal Responsibility  Treatment Modality: Other: Recreation Therapy  Interventions utilized were exploration, group exercise, patient education, problem solving, and support  Purpose: coping skills, maladaptive thinking, feelings, irrational fears, communication skills, insight or knowledge, self-worth, self-care, relapse prevention strategies, and trigger / craving management    Name: Roman Ellis YOB: 1975   MR: [id number]      Facilitator: Recreational Therapist  Level of Participation: active  Quality of Participation: appropriate/pleasant, attentive, and cooperative  Interactions with others: appropriate and gave feedback  Mood/Affect: appropriate and brightens with interaction  Triggers (if applicable): n/a  Cognition: coherent/clear  Progress: Significant  Comments: pt problem is depressed mood.  Pt joins group with little encouragement.  Engages easily and openly during group.    Plan: continue with services

## 2025-08-04 NOTE — CARE PLAN
The patient's goals for the shift include go to groups    The clinical goals for the shift include maintain safety    Over the shift, the patient did not make progress toward the following goals. Barriers to progression include isolative at times. Recommendations to address these barriers include encourage pt to go to groups and engage with patients.

## 2025-08-05 PROCEDURE — 2500000002 HC RX 250 W HCPCS SELF ADMINISTERED DRUGS (ALT 637 FOR MEDICARE OP, ALT 636 FOR OP/ED): Performed by: PSYCHIATRY & NEUROLOGY

## 2025-08-05 PROCEDURE — 1140000001 HC PRIVATE PSYCH ROOM DAILY

## 2025-08-05 PROCEDURE — 2500000001 HC RX 250 WO HCPCS SELF ADMINISTERED DRUGS (ALT 637 FOR MEDICARE OP): Performed by: INTERNAL MEDICINE

## 2025-08-05 PROCEDURE — 99232 SBSQ HOSP IP/OBS MODERATE 35: CPT | Performed by: PSYCHIATRY & NEUROLOGY

## 2025-08-05 PROCEDURE — 2500000001 HC RX 250 WO HCPCS SELF ADMINISTERED DRUGS (ALT 637 FOR MEDICARE OP): Performed by: PSYCHIATRY & NEUROLOGY

## 2025-08-05 PROCEDURE — 2500000002 HC RX 250 W HCPCS SELF ADMINISTERED DRUGS (ALT 637 FOR MEDICARE OP, ALT 636 FOR OP/ED): Performed by: INTERNAL MEDICINE

## 2025-08-05 RX ADMIN — TAMSULOSIN HYDROCHLORIDE 0.4 MG: 0.4 CAPSULE ORAL at 08:08

## 2025-08-05 RX ADMIN — LITHIUM CARBONATE 900 MG: 450 TABLET, EXTENDED RELEASE ORAL at 20:42

## 2025-08-05 RX ADMIN — FOLIC ACID 1 MG: 1 TABLET ORAL at 08:08

## 2025-08-05 RX ADMIN — AMLODIPINE BESYLATE 5 MG: 5 TABLET ORAL at 08:08

## 2025-08-05 RX ADMIN — SERTRALINE 75 MG: 50 TABLET, FILM COATED ORAL at 08:08

## 2025-08-05 ASSESSMENT — PAIN - FUNCTIONAL ASSESSMENT
PAIN_FUNCTIONAL_ASSESSMENT: 0-10
PAIN_FUNCTIONAL_ASSESSMENT: 0-10

## 2025-08-05 ASSESSMENT — PAIN SCALES - GENERAL
PAINLEVEL_OUTOF10: 0 - NO PAIN
PAINLEVEL_OUTOF10: 0 - NO PAIN

## 2025-08-05 NOTE — PROGRESS NOTES
LSW coordinated family meeting for tomorrow at 2pm. LSW inquired with  about counseling services and there is a 1-2 month waiting list. LSW contacted [facility] and there is approximately two weeks time frame to obtain counseling services. As well as the Dual IOP program would be available within a week for pt to participate. All will be discussed with pt tomorrow at the family meeting with tentative discharge for Thursday.     LSW contacted [facility] to inquire about their service delivery time. LSW was instructed that they no longer have a waiting list for services. As well as they do offer PHP and IOP services with evening times available. Pt will have to complete intake in order to participate in services.     LSW spoke with pt's wife during visitation about the possibility of pt taking some time off from working to focus on recovery. Pt's wife states that financially they would be able to do so, however pt is used to working and does not know if this would be something that pt would be agreeable with. Further discussion to take place at family meeting tomorrow.     NIXON Ross

## 2025-08-05 NOTE — PROGRESS NOTES
"Roman Ellis is a 50 y.o. male on day 9 of admission presenting with Severe depressed bipolar I disorder without psychotic features (Multi).      Subjective   Chart reviewed and case discussed with multidisciplinary treatment team.     Per chart review, pt has been attending group and shows increased engagement with peers.     Pt was in bed reading [place] on room entry. He presented as more open to talking stating he is steadily feeling better. He reports that he is sleeping a bit more than he would like to and thinks it will a small challenge to deal with after he discharged to keep up with whatever demands his potential job may present with. Reports appetite is still up from last week and he doesn't feel like eating is a chore anymore. When asked why he feels better, he stated similar phrases from previous days such as: \"I feel more hopeful\" and \"turned a corner.\" He stated he spoke with his wife yesterday and reports that his outlook on the diagnosis is more optimistic and hopeful the medications will work. When asked about his substance use and how that might look like after his discharge, he reports he has \"zero interest\" and didn't use any substances for more than a month before his admission. When trying to broach the subject of why he went for his gun prior to his admission, he stated \"I shouldn't know have gone for it, I don't know why I went for it.\" \"I wish I could say it was more of a coincidence than anything.\" Reports was trying to make sure he could \"say it right\" and wasn't misconstrued when talking about the gun issue.     Provider met individually with patient and wife today to discuss his progress.  Plan is for potential discharge Thursday evening.  This will allow for another lithium level on Thursday morning.  There is a family meeting scheduled for tomorrow afternoon at 2:00 with  provider patient and wife where we will discuss discharge recommended treatment level " "including intensive outpatient versus partial hospitalization.  There is some concern that patient will prioritize work over outpatient treatment and so we will meet with the patient as a group to discuss the benefit of accepting this level of treatment at discharge.  Patient continues to deny that gun manipulation prior to admission was suicidal in origin.  Wife remains concerned that this may have been why he was going for now removed from home guns.         Objective     Last Recorded Vitals  Blood pressure 134/80, pulse 55, temperature 36.6 °C (97.9 °F), temperature source Temporal, resp. rate 16, height 1.753 m (5' 9\"), weight 78.1 kg (172 lb 3.2 oz), SpO2 98%.    Sleep Log  Estimated \"Sleeping\" Durations   Night Est. Hours   07/27 11.00-12.00   07/28 10.00-10.25   07/29 10.50-11.25   07/30 10.75-11.75   07/31 11.00-11.75   08/01 13.75-14.00   08/02 10.50-11.25   08/03 10.50-10.75   08/04 9.25-10.50   08/05 0.00        Review of Systems    Psychiatric ROS - Adult  Anxiety: Negative  Depression: anhedonia  Delirium: negative  Psychosis: negative  Pearl: negative  Safety Issues: Denies SI/ AVH  Psychiatric ROS Comment: n/a    Physical Exam  Constitutional:       Appearance: Normal appearance.   HENT:      Head: Normocephalic and atraumatic.     Eyes:      Comments: Conjunctiva non-erythematous, non-icteric   Pulmonary:      Comments: Without increased respiratory effort    Neurological:      General: No focal deficit present.      Mental Status: He is alert.      Comments: Seen to be ambulating appropriately on unit     Mental Status Examination  General Appearance: Laying in bed, beard, tattoo on inner right arm.   Gait/Station: ambulating independently without assistance required  Speech: conversational volume, overall with minimal spontaneous production  Mood: \"better than earlier this week\"  Affect: Constricted and  increasingly reactive, congruent with mood.   Thought Process: Linear, goal directed  Thought " Content: mood congruent, focusing on his boredom on the unit minimally contributed content related to gains thus far until directly asked  Perception: No perceptual abnormalities noted  Level of Consciousness: Alert  Orientation: Alert and oriented to person, place, time and situation as evidenced by being aware of his hospitalization, time of day, and self. Understood it is Monday and today was when he was supposed to start his job  Attention and Concentration: appears to be intact due to ability to have full conversation without need to redirect patient  Insight: Fair, in regards to understanding mental health condition; Feels that his condition is manageable given the adherence to treatment plan  Judgment: Fair, expresses agreement with the plan and openness to the process of hospitalization; Understands the continuity of his diagnosis and willingness to continue medications past his discharge       Psychiatric Risk Assessment  Violence Risk Assessment: acces to weapons, major mental illness, male,  history or weapons training, presence of firearms in home, substance abuse, and unemployment  Acute Risk of Harm to Others is Considered: low   Suicide Risk Assessment: access to weapons, , current psychiatric illness, feelings of hopelessness, history of trauma or abuse, male, presence of firearms in home, severe anxiety, substance abuse, and suicidal ideations  Protective Factors against Suicide: adherence to  treatment, child-related concerns/living with children at home < 18 yrs, marriage/partnership, and positive family relationships  Acute Risk of Harm to Self is Considered: moderate    Relevant Results  Results for orders placed or performed during the hospital encounter of 07/27/25 (from the past 96 hours)   Lithium level   Result Value Ref Range    Lithium 0.61 0.60 - 1.20 mmol/L           Assessment & Plan  Pure hypercholesterolemia    Hypertension    Tobacco use disorder    Severe depressed  bipolar I disorder without psychotic features (Multi)    Bipolar depression f31.4     Roman Ellis is a 50 y.o. male with a past medical history of recently diagnosed bipolar disorder, past history of anxiety, depression, and substance use disorder here for SI and worsening depressive symptoms.      He has has multiple recent admissions complicated by drug and alcohol use, however most recently with a diagnosis of bipolar disorder. Per patient, he has been feeling increasingly depressed over the past month, much worse over the past two weeks. It is likely that his current clinical picture is an acute depressive episode in the context of his bipolar disorder. He will require medication adjustments to best address his current clinical picture. He otherwise denies current suicidality, but exhibited concerning symptoms for suicidality outpatient and remains at moderate to high risk of suicide outpatient without additional treatment. He requires inpatient admission for medication adjustments and close clinical monitoring.      SBIRT - Patient was screened for substances of abuse and had a positive screen.  Brief intervention then held with patient discussing the impact of substance abuse/use disorder on bio/psycho/social functioning and patient's readiness for change.  On discharge, patient will be referred to treatment for substance use disorder.     8/1- patient reports that he is feeling better today. He states that he is still bored, but seeing progress. Subjective mood improvement reported.  Objective findings not yet tracking however. Patient is participating in groups and is seen walking around the unit.    8/4- patient reports he is feeling more hopeful and slightly less bored. He is optimistic of progress. Patient is more engaged in groups and seen walking around the unit.    8/5- limited change from day prior.  Affect remains constricted and evidence is able to mobilize.  Family meeting tomorrow, lithium level  Thursday morning and presently planned for then discharge later in the day Thursday.      Plan  Biological -      - continue zoloft 75 mg daily for depression - further titration as tolerated, beneficial, to 100 mg at discharge  - continue lithium 900 mg ER, titrate as tolerated for mood disorder              Monitoring for lithium:              - Lithium level on er 7500 = 0.61, on Lithium er 600 mg = 0.56, next level Thursday AM (ordered)  - renal function, thyroid function within normal limits, reviewed EKG of may 19 2025 - no conduction anomaly noted  -consider addition of seroquel once lithium at steady state as mood adjunct     Literature supports lithium combination therapy - utilizing ssri as combo agent in lieu of antipsychotic seroquel but may add seroquel adjunctively depending on response to lithium + zoloft.  Avoiding previously used snri effexor to minimize potential for switching.  Consider ECT - not available in this location.        2. Psychological -      Patient is encouraged to participate with the therapeutic milieu and program and group therapy        3. Sociological -      Patient encouraged to cooperate with social work staff on issues relevant to discharge planning  Family meeting Wednesday afternoon     4. Patient discussion -      Plan discussed with patient who concurs with the assessment, treatment plan, and with the following informed consent for medication:   The benefits, risks, most common side effects, possible consequences of not taking the medication, and alternatives to the recommended treatment has been discussed at length.                          KARENA MOHAN

## 2025-08-05 NOTE — GROUP NOTE
Group Topic: Cognitive Focus   Group Date: 8/5/2025  Start Time: 1500  End Time: 1600  Facilitators: KOKI Lo-CNS   Department: [facility] PROVIDER VIRTUAL    Number of Participants: 4   Group Focus: acceptance, anger management, anxiety, clarity of thought, concentration, and daily focus  Treatment Modality: Cognitive Behavioral Therapy  Interventions utilized were exploration, mental fitness, and reality testing  Purpose: coping skills, insight or knowledge, relapse prevention strategies, and trigger / craving management    Name: Roman Ellis YOB: 1975   MR: [id number]      Facilitator: Nurse Practitioner  Level of Participation: did not attend  Plan: patient will be encouraged to attend group

## 2025-08-05 NOTE — NURSING NOTE
VANNESSA NOTE     Problem:  Depression     Behavior:  Pt isolative to room this evening. Pleasant upon approach. Increased engagement and improved eye contact noted vs prior shifts. Compliant with meds, cooperative with care.  Group Participation: n/a  Appetite/Meals: Declined HS snack     Interventions:  1:1 provided. Evening meds administered per orders. Encouraged pt to make needs known.     Response:  Pt appears to be resting in bed at this time.     Plan:  Will continue to monitor behaviors and effectiveness of interventions while maintaining pt safety.

## 2025-08-05 NOTE — GROUP NOTE
Group Topic: Goals   Group Date: 8/4/2025  Start Time: 2010  End Time: 2025  Facilitators: Mandy Vela   Department: [facility] Geriatric     Number of Participants: 12   Group Focus: affirmation  Treatment Modality: Interpersonal Therapy  Interventions utilized were reminiscence and support  Purpose: feelings and self-care    Name: Roman Ellis YOB: 1975   MR: [id number]      Facilitator: Mental Health PCNA  Level of Participation: did not attend  Quality of Participation: was invited did not attend group  Interactions with others: isolative but very appropriate  Mood/Affect: closed / guarded and depressed  Triggers (if applicable): none  Cognition: coherent/clear  Progress: Minimal  Comments: patient presented with severe depressed bipolar 1 disorder  Plan: continue with services

## 2025-08-05 NOTE — CARE PLAN
The patient's goals for the shift include possibly discharge after family meeting tomorrow    The clinical goals for the shift include maintain safety    Over the shift, the patient did not make progress toward the following goals. Barriers to progression include none. Recommendations to address these barriers include continue current care plan.

## 2025-08-05 NOTE — GROUP NOTE
Group Topic: Goals   Group Date: 8/5/2025  Start Time: 0730  End Time: 0800  Facilitators: Raissa Little LPN   Department: [facility]    Number of Participants: 8   Group Focus: goals  Treatment Modality: Patient-Centered Therapy  Interventions utilized were support  Purpose: insight or knowledge    Name: Roman Ellis YOB: 1975   MR: [id number]      Facilitator: Mental Health PCNA  Level of Participation: did not attend  Quality of Participation: was invited did not attend group  Interactions with others: isolative but very appropriate  Mood/Affect: closed / guarded and depressed  Triggers (if applicable): none  Cognition: coherent/clear  Progress: Minimal  Comments: patient presented with severe depressed bipolar 1 disorder  Plan: continue with services

## 2025-08-05 NOTE — GROUP NOTE
Group Topic: Feeling Awareness/Expression   Group Date: 8/5/2025  Start Time: 1015  End Time: 1105  Facilitators: BOSSMAN Robledo   Department: [facility] REHABTH VIRTUAL    Number of Participants: 8   Group Focus: other Cognitive Distortions  Treatment Modality: Other: Recreation Therapy  Interventions utilized were exploration, group exercise, patient education, problem solving, and support  Purpose: coping skills, maladaptive thinking, feelings, irrational fears, communication skills, insight or knowledge, self-worth, self-care, relapse prevention strategies, and trigger / craving management    Name: Roman Ellis YOB: 1975   MR: [id number]      Facilitator: Recreational Therapist  Level of Participation: active  Quality of Participation: appropriate/pleasant, attentive, and cooperative  Interactions with others: appropriate and gave feedback  Mood/Affect: appropriate  Triggers (if applicable): n/a  Cognition: coherent/clear  Progress: Significant  Comments: pt problem is depressed mood.  Pt joins group with little encouragement.  Engages easily and appropriately during group.  Plan: continue with services

## 2025-08-05 NOTE — CONSULTS
"Nutrition Assessement Note    Nutrition Assessment    Reason for Assessment: Length of stay    Reason for Hospital Admission:  Roman Ellis is a 50 y.o. male who is admitted for bipolar depression. Plan for discharge this week.     Malnutrition Screening Tool (MST)  Have you recently lost weight without trying?: No  If yes, how much weight have you lost?: Unsure  Weight Loss Score: 0  Have you been eating poorly because of a decreased appetite?: No  Malnutrition Score: 0  Nutrition Screen  Stage 3 or 4 Pressure Injury or Multiple Non-Healing Wounds: No  Home Tube Feeding or Total Parenteral Nutrition (TPN): No  Dietitian Consult Needed: No    Medical History[1]   Surgical History[2]    Nutrition History:  Energy Intake: Good > 75 %  Food and Nutrient History: consistently eating 100% of meals    Anthropometrics:  Ht: 175.3 cm (5' 9\"), Wt: 78.1 kg (172 lb 3.2 oz), BMI: 25.42  IBW/kg (Dietitian Calculated): 72.73 kg  Percent of IBW: 108 %    Weight Change:  Daily Weight  08/04/25 : 78.1 kg (172 lb 3.2 oz)  07/26/25 : 79.4 kg (175 lb)  06/27/25 : 80.3 kg (177 lb)  06/04/25 : 78 kg (172 lb)  06/03/25 : 78 kg (172 lb)  05/19/25 : 77.1 kg (170 lb)  05/15/25 : 79.4 kg (175 lb)  05/01/25 : 78.6 kg (173 lb 4.5 oz)  04/30/25 : 86.2 kg (190 lb)  02/12/25 : 81.6 kg (180 lb)  12/18/24 : 84.8 kg (187 lb)  12/06/23 : 84.8 kg (187 lb)  08/04/23 : 80.7 kg (178 lb)  03/22/23 : 85.7 kg (189 lb)  02/02/23 : 83.9 kg (185 lb)    Weight History / % Weight Change: wt stable for the past ~3 months    Nutrition Focused Physical Exam Findings: defer: not available    Nutrition Significant Labs:  Lab Results   Component Value Date    WBC 11.0 07/26/2025    HGB 16.0 07/26/2025    HCT 46.9 07/26/2025     07/26/2025    CHOL 164 07/28/2025    TRIG 93 07/28/2025    HDL 36.1 07/28/2025    ALT 17 07/26/2025    AST 12 07/26/2025     07/26/2025    K 4.2 07/26/2025     07/26/2025    CREATININE 1.06 07/26/2025    BUN 15 07/26/2025    " CO2 27 07/26/2025    TSH 0.65 07/29/2025    INR 1.0 05/16/2025    GLUF 122 (H) 07/28/2025    HGBA1C 6.3 (H) 12/14/2024     Nutrition Specific Medications:  Scheduled Medications[3]  Continuous Medications[4]    Dietary Orders (From admission, onward)       Start     Ordered    07/27/25 1322  Adult diet Regular  Diet effective now        Question:  Diet type  Answer:  Regular    07/27/25 1324                  Estimated Needs:   Estimated Energy Needs  Total Energy Estimated Needs in 24 hours (kCal): 1818 kCal  Energy Estimated Needs per kg Body Weight in 24 hours (kCal/kg): 25 kCal/kg  Method for Estimating Needs: IBW    Estimated Protein Needs  Total Protein Estimated Needs in 24 Hours (g): 73 g  Protein Estimated Needs per kg Body Weight in 24 Hours (g/kg): 1 g/kg  Method for Estimating 24 Hour Protein Needs: IBW    Estimated Fluid Needs  Method for Estimating 24 Hour Fluid Needs: 1 ml/kcal or per MD       Nutrition Diagnosis   Nutrition Diagnosis:  Malnutrition Diagnosis  Patient has Malnutrition Diagnosis: No    Nutrition Diagnosis  Patient has Nutrition Diagnosis: No       Nutrition Interventions/Recommendations   Nutrition Interventions and Recommendations:  Nutrition Prescription: Nutrition prescription for oral nutrition    Nutrition Recommendations:  Individualized Nutrition Prescription Provided for : continue regular diet. consider CCD 75g diet if BGs are monitored and above 150 mg/dL    Nutrition Interventions/Goals:   Food and/or Nutrient Delivery Interventions  Interventions: No interventions at this time    Education Documentation  No documentation found.              Nutrition Monitoring and Evaluation   Monitoring/Evaluation:   Food/Nutrient Related History Monitoring  Monitoring and Evaluation Plan: Estimated Energy Intake  Estimated Energy Intake: Energy intake greater or equal to 75% of estimated energy needs    Goal Status: New goal(s) identified    Follow Up  Time Spent (min): 20 minutes  Last  Date of Nutrition Visit: 08/05/25  Nutrition Follow-Up Needed?: 7-10 days  Follow up Comment: 8/15/25          [1]   Past Medical History:  Diagnosis Date    Fever 02/12/2025    History of laparoscopic appendectomy 02/12/2025   [2] History reviewed. No pertinent surgical history.  [3] amLODIPine, 5 mg, oral, Daily  folic acid, 1 mg, oral, Daily  lithium ER, 900 mg, oral, Nightly  sertraline, 75 mg, oral, Daily  tamsulosin, 0.4 mg, oral, Daily     [4]

## 2025-08-05 NOTE — GROUP NOTE
Group Topic: Other   Group Date: 8/5/2025  Start Time: 1330  End Time: 1430  Facilitators: BOSSMAN Robledo   Department: [facility] REHABTH VIRTUAL    Number of Participants: 8   Group Focus: other Mind Joggers  Treatment Modality: Other: Recreation Therapy  Interventions utilized were mental fitness and reminiscence  Purpose: other: fun, elevate mood, increase socialization, stimulate memory    Name: Roman Ellis YOB: 1975   MR: [id number]      Facilitator: Recreational Therapist  Level of Participation: active  Quality of Participation: appropriate/pleasant, attentive, and cooperative  Interactions with others: appropriate and gave feedback  Mood/Affect: appropriate and brightens with interaction  Triggers (if applicable): n/a  Cognition: coherent/clear  Progress: Significant  Comments: pt problem is depressed mood.  Pt remains focused, cooperative, appropriate and active with participation during group game.  Plan: continue with services

## 2025-08-05 NOTE — CARE PLAN
The clinical goals for the shift include maintain safety, promote rest.    Over the shift, the patient did make progress toward the following goals.     Problem: Mental health issues  Goal: Stabilize adverse mental health factors affecting plan of care  Outcome: Progressing

## 2025-08-06 PROCEDURE — 2500000002 HC RX 250 W HCPCS SELF ADMINISTERED DRUGS (ALT 637 FOR MEDICARE OP, ALT 636 FOR OP/ED): Performed by: PSYCHIATRY & NEUROLOGY

## 2025-08-06 PROCEDURE — 99232 SBSQ HOSP IP/OBS MODERATE 35: CPT | Performed by: INTERNAL MEDICINE

## 2025-08-06 PROCEDURE — 2500000002 HC RX 250 W HCPCS SELF ADMINISTERED DRUGS (ALT 637 FOR MEDICARE OP, ALT 636 FOR OP/ED): Performed by: INTERNAL MEDICINE

## 2025-08-06 PROCEDURE — 99233 SBSQ HOSP IP/OBS HIGH 50: CPT | Performed by: PSYCHIATRY & NEUROLOGY

## 2025-08-06 PROCEDURE — 2500000001 HC RX 250 WO HCPCS SELF ADMINISTERED DRUGS (ALT 637 FOR MEDICARE OP): Performed by: INTERNAL MEDICINE

## 2025-08-06 PROCEDURE — 2500000001 HC RX 250 WO HCPCS SELF ADMINISTERED DRUGS (ALT 637 FOR MEDICARE OP): Performed by: PSYCHIATRY & NEUROLOGY

## 2025-08-06 PROCEDURE — 1140000001 HC PRIVATE PSYCH ROOM DAILY

## 2025-08-06 RX ADMIN — SERTRALINE 75 MG: 50 TABLET, FILM COATED ORAL at 08:01

## 2025-08-06 RX ADMIN — LITHIUM CARBONATE 900 MG: 450 TABLET, EXTENDED RELEASE ORAL at 20:52

## 2025-08-06 RX ADMIN — AMLODIPINE BESYLATE 5 MG: 5 TABLET ORAL at 08:02

## 2025-08-06 RX ADMIN — FOLIC ACID 1 MG: 1 TABLET ORAL at 08:02

## 2025-08-06 RX ADMIN — TAMSULOSIN HYDROCHLORIDE 0.4 MG: 0.4 CAPSULE ORAL at 08:02

## 2025-08-06 ASSESSMENT — PAIN - FUNCTIONAL ASSESSMENT: PAIN_FUNCTIONAL_ASSESSMENT: 0-10

## 2025-08-06 ASSESSMENT — PAIN SCALES - GENERAL
PAINLEVEL_OUTOF10: 0 - NO PAIN
PAINLEVEL_OUTOF10: 0 - NO PAIN

## 2025-08-06 NOTE — GROUP NOTE
Group Topic: Goals   Group Date: 8/5/2025  Start Time: 2010  End Time: 2025  Facilitators: Mandy Vela   Department: [facility] Geriatric     Number of Participants: 9   Group Focus: check in  Treatment Modality: Interpersonal Therapy  Interventions utilized were reminiscence and story telling  Purpose: feelings    Name: Roman Ellis YOB: 1975   MR: [id number]      Facilitator: Mental Health PCNA  Level of Participation: did not attend  Quality of Participation: patient declined group  Interactions with others: unknown   Mood/Affect: closed / guarded, depressed, and flat  Triggers (if applicable): none  Cognition: coherent/clear  Progress: Minimal  Comments: patient presented with severe depressed bipolar 1 disorder  Plan: continue with services

## 2025-08-06 NOTE — GROUP NOTE
Group Topic: Goals   Group Date: 8/6/2025  Start Time: 0730  End Time: 0800  Facilitators: Raissa Little LPN   Department: [facility]    Number of Participants: 11   Group Focus: goals  Treatment Modality: Interpersonal Therapy  Interventions utilized were support  Purpose: feelings    Name: Roman Ellis YOB: 1975   MR: [id number]      Facilitator: [facility]  Level of Participation: did not attend  Quality of Participation: was invited did not attend group  Interactions with others: isolative but very appropriate  Mood/Affect: closed / guarded and depressed  Triggers (if applicable): none  Cognition: coherent/clear  Progress: Minimal  Comments: patient presented with severe depressed bipolar 1 disorder  Plan: continue with services

## 2025-08-06 NOTE — PROGRESS NOTES
[facility]: [facility]  PROGRESS NOTE      Roman Ellis is a 50 y.o. male that is being seen  today for follow-up [facility]..  Subjective   Patient is being seen for follow-up at [facility].  Patient is on amlodipine and blood pressure has been fairly controlled.  Patient also is on Flomax and denies any urinary complaints.  Patient has been participating in activities.  Denies any suicidal ideations.  Patient is a lab work reviewed.  Patient lithium levels are normal now       ROS  Negative for fever or chills  Negative for sore throat, ear pain, nasal discharge  Negative for cough, shortness of breath or wheezing  Negative for chest pain, palpitations, swelling of legs  Negative for abdominal pain, constipation, diarrhea, blood in the stools  Negative for urinary complaints  Negative for headache, dizziness, weakness or numbness  Negative for joint pain  Positive for depression or anxiety  All other systems reviewed and were negative   Vitals:    08/06/25 0700   BP: 127/76   Pulse: 53   Resp: 16   Temp: 36.6 °C (97.9 °F)   SpO2: 98%      Vitals:    08/04/25 0831   Weight: 78.1 kg (172 lb 3.2 oz)     Body mass index is 25.43 kg/m².  Physical Exam  Constitutional: Patient does not appear to be in any acute distress  Head and Face: NCAT  Eyes: Normal external exam, EOMI  ENT: Normal external inspection of ears and nose. Oropharynx normal.  Cardiovascular: RRR, S1/S2, no murmurs, rubs, or gallops, radial pulses +2, no edema of extremities  Pulmonary: CTAB, no respiratory distress.  Abdomen: +BS, soft, non-tender, nondistended, no guarding or rebound, no masses noted  MSK: No joint swelling, normal movements of all extremities. Range of motion- normal.  Skin- No lesions, contusions, or erythema.  Peripheral puslses palpable bilaterally 2+  Neuro: AAO X3, Cranial nerves 2-12 grossly intact,DTR 2+ in all 4 limbs       LABS   [unfilled]  Lab Results   Component Value Date    GLUCOSE 109 (H)  07/26/2025    CALCIUM 9.6 07/26/2025     07/26/2025    K 4.2 07/26/2025    CO2 27 07/26/2025     07/26/2025    BUN 15 07/26/2025    CREATININE 1.06 07/26/2025     Lab Results   Component Value Date    ALT 17 07/26/2025    AST 12 07/26/2025    ALKPHOS 39 07/26/2025    BILITOT 0.6 07/26/2025     Lab Results   Component Value Date    WBC 11.0 07/26/2025    HGB 16.0 07/26/2025    HCT 46.9 07/26/2025    MCV 89 07/26/2025     07/26/2025     Lab Results   Component Value Date    CHOL 164 07/28/2025    CHOL 209 (H) 05/02/2025    CHOL 162 12/14/2024     Lab Results   Component Value Date    HDL 36.1 07/28/2025    HDL 89.0 05/02/2025    HDL 41.0 12/14/2024     Lab Results   Component Value Date    LDLCALC 109 (H) 07/28/2025    LDLCALC 111 (H) 05/02/2025    LDLCALC 105 (H) 12/14/2024     Lab Results   Component Value Date    TRIG 93 07/28/2025    TRIG 45 05/02/2025    TRIG 81 12/14/2024     Lab Results   Component Value Date    HGBA1C 6.3 (H) 12/14/2024     Other labs not included in the list above were reviewed either before or during this encounter.    History    Medical History[1]  Surgical History[2]  Family History[3]  Allergies[4]  Medications Ordered Prior to Encounter[5]  Immunization History   Administered Date(s) Administered    Flu vaccine (IIV4), preservative free *Check age/dose* 10/05/2020, 12/06/2023    Influenza, injectable, quadrivalent 11/14/2018    Influenza, seasonal, injectable 11/16/2012    Ariella SARS-CoV-2 Vaccination 06/06/2021    Tdap vaccine, age 7 year and older (BOOSTRIX, ADACEL) 08/10/2012     Patient's medical history was reviewed and updated either before or during this encounter.  ASSESSMENT / PLAN:  Active Hospital Problems    *Severe depressed bipolar I disorder without psychotic features (Multi)      Tobacco use disorder      Pure hypercholesterolemia      Hypertension       Patient is being seen for follow-up visit at [facility].  Patient has history of hypertension and  is on amlodipine and blood pressure has been fairly controlled.  Will continue with amlodipine.  Patient has been active smoker.  Patient is on nicotine patch.  Patient also has history of benign prostate hyperplasia and is on tamsulosin and symptoms have been well-controlled.  Will continue with the same medications.  Patient lithium levels are normal.  Patient is being seen by geropsych team.      Judah Elder MD                  [1]   Past Medical History:  Diagnosis Date    Fever 02/12/2025    History of laparoscopic appendectomy 02/12/2025   [2] History reviewed. No pertinent surgical history.  [3]   Family History  Problem Relation Name Age of Onset    Diabetes Mother      Diabetes Father      Hypertension Father      Stroke Maternal Grandfather     [4] No Known Allergies  [5]   No current facility-administered medications on file prior to encounter.     Current Outpatient Medications on File Prior to Encounter   Medication Sig Dispense Refill    amLODIPine (Norvasc) 5 mg tablet Take 1 tablet (5 mg) by mouth once daily. 90 tablet 1    folic acid (Folvite) 1 mg tablet Take 1 tablet (1 mg) by mouth once daily. 90 tablet 1    gabapentin (Neurontin) 300 mg capsule Take 1 capsule (300 mg) by mouth 3 times a day. 90 capsule 0    lisinopril 10 mg tablet Take 1 tablet (10 mg) by mouth once daily. 90 tablet 1    lurasidone (Latuda) 40 mg tablet Take 1 tablet (40 mg) by mouth once daily in the evening. Take with meals. 30 tablet 1    multivitamin with minerals tablet Take 1 tablet by mouth once daily. 30 tablet 11    naltrexone (Depade) 50 mg tablet Take 1 tablet (50 mg) by mouth once daily at bedtime. 90 tablet 1    propranolol (Inderal) 10 mg tablet Take 2 tablets (20 mg) by mouth 2 times a day. 120 tablet 0    tamsulosin (Flomax) 0.4 mg 24 hr capsule Take 1 capsule (0.4 mg) by mouth once daily. 90 capsule 1    nicotine (Nicoderm CQ) 14 mg/24 hr patch Place 1 patch over 24 hours on the skin once daily. 30 patch  0    nicotine polacrilex (Nicorette) 2 mg gum Chew 1 each (2 mg) every 2 hours if needed for smoking cessation (nicotine craving, urge to smoke). 100 each 0    thiamine (Vitamin B-1) 100 mg tablet Take 1 tablet (100 mg) by mouth once daily. (Patient not taking: Reported on 7/27/2025) 30 tablet 11    venlafaxine XR (Effexor-XR) 37.5 mg 24 hr capsule Take 1 capsule (37.5 mg) by mouth once daily. 7 capsule 0

## 2025-08-06 NOTE — NURSING NOTE
BIRP NOTE     Problem:  Depression      Behavior:  Pt presents calm at time of assessment. Pt Denies Auditory and Visual , and Denies suicidal ideation with plan, and voices no homicidal ideation. Pt voices 0/10 pain at this time during assessment. Pt educated on medications and has no other new concerns. Pt compliant with medication at this time. Pt has not been given PRN medications. Pt has no other new concerns with nutritional needs at this time. Pt encouraged to attend unit programing throughout shift to and to adopt positive coping skills. Pt also encouraged to wear slip resistance foot wear to reduce and minimize falls while ambulating around the hospital unit. Pt encouraged to be social while on unit with other peers.     Group Participation: Yes active  Appetite/Meals: Good    [unfilled]      Interventions:  Pt. Was offered groups and their scheduled medications per MAR orders.    PRN Medications:  N/A     Response:  Pt. Has been compliant w/ meds, tolerated them well; and did attend the majority of group sessions w/ good results.     Plan:  Pt. will continue to be monitored Q 15 minutes  for changes in mental status & safety on the unit.    Ele De La O RN  8/5/2025

## 2025-08-06 NOTE — CARE PLAN
The patient's goals for the shift include go home    The clinical goals for the shift include Medication compliance    Over the shift, the patient did make progress toward the following goals. Barriers to progression include small amounts of depression reside. Recommendations to address these barriers include encouraging outpatient treatment compliance.      Problem: Discharge Planning  Goal: Discharge to home or other facility with appropriate resources  Outcome: Progressing

## 2025-08-06 NOTE — GROUP NOTE
Group Topic: Self-Care/Wellness   Group Date: 8/6/2025  Start Time: 1000  End Time: 1045  Facilitators: BOSSMAN Robledo   Department: [facility]    Number of Participants: 9   Group Focus: other Self Care Tips  Treatment Modality: Other: Recreation Therapy  Interventions utilized were exploration, group exercise, patient education, problem solving, and support  Purpose: coping skills, maladaptive thinking, feelings, irrational fears, communication skills, insight or knowledge, self-worth, self-care, relapse prevention strategies, and trigger / craving management    Name: Roman Ellis YOB: 1975   MR: [id number]      Facilitator: Recreational Therapist  Level of Participation: active  Quality of Participation: appropriate/pleasant and attentive  Interactions with others: appropriate and gave feedback  Mood/Affect: appropriate  Triggers (if applicable): n/a  Cognition: coherent/clear  Progress: Significant  Comments: pt problem is depressed mood.  Pt joins group with little encouragement.  Engages easily and appropriately during group.  Plan: continue with services

## 2025-08-06 NOTE — PROGRESS NOTES
LSW, provider and pt's wife met with pt for a family meeting. Discussion of pt not returning to work and attending IOP was had. Pt was agreeable with this plan and is interested in the  virtual IOP. Pt would benefit from taking some time off work to engage in treatment as well as spend time with family. Pt will return to work after completion of IOP which likely will be 6 weeks. LSW discussed individual counseling options for pt such as [facility] and [facility]. Pt is agreeable to services through [facility] as it is closer to home. LSW will set up DA for pt to establish services. Pt has with stained from nicotine and plays to do so upon discharge. Pt will continue to attend AA and abstain from THC. Pt will discharge tomorrow home with pt's wife who will pick him up at 7pm when she gets off work. Pt will be discharged home with medications in hand. Pt and pt's wife have no safety concerns with pt returning home.     LSW emailed  IOP program to refer pt. LSW contacted [facility] and schedule intake assessment for pt for Tuesday 8/12/25 at 3:30pm.     NIXON RossW

## 2025-08-06 NOTE — NURSING NOTE
VANNESSA NOTE     Problem:  Pt. Is continuing their journey on working to improve their anxiety & depressive Sx.     Behavior:  Pt. Is cooperative w/ Tx. Plan and has been agreeable to talking w/ staff & peers alike. Pt. Has been able to maintain safety. Pt. Eager for discharge tomorrow night.  Appetite/Meals: good        Interventions:  Pt. Was offered groups and their scheduled medications.     Response:  Pt. Has been compliant w/ meds, tolerated them well; and did attend the majority of group sessions w/ good results.     Plan:  Pt. will continue to be monitored for changes in mental status & safety on the unit.

## 2025-08-06 NOTE — CARE PLAN
The patient's goals for the shift include to be discharged    The clinical goals for the shift include Medication compliance    Recommendations to address these barriers include educating pt to utilize safety crisis plan and positive coping skills while in hospital setting and at home to maintain and control thoughts of self harm.

## 2025-08-06 NOTE — PROGRESS NOTES
"Roman Ellis is a 50 y.o. male on day 10 of admission presenting with Severe depressed bipolar I disorder without psychotic features (Multi).    Level 3 note - 75 minutes total time    Subjective   Chart reviewed and case discussed with multidisciplinary treatment team.     Per chart review, pt has been attending group and shows increased engagement with peers.     Pt was walking around when provider approached him to talk for a few minutes. He seemed to more open to talking than before. He states that he was a \"little bummed\" for not getting discharged today but did not seem too bothered by it. However, he states he has been feeling stable and doesn't feel empty or down anymore, and better from last week. He continues to have a good appetite and reports no sleep issues. He does not endorse any symptoms of bobo or obsessive-compulsive thoughts. He does endorse slight anxiety towards getting discharged and getting adjusted to life outside of the hospital. He is also anxious about finding out whether he will still have a job or not which he will know by Friday. Regardless of the anxiety, he is more enthused to be back with his family and be there for his son's birthday.     Had a short conversation about his favorite books to read. He finished [employer] in 3 days and stated that he is usually a fast reader, possibly indicating a renewed interests in his hobbies. He is currently reading [employer].      At 2:00 this afternoon, provider, , patient, patient's wife met to discuss progress and discharge planning elements.  Patient continues to report feeling significantly better than when came to the facility.  More hopeful, less depressed, and not suicidal.  He is willing to go to intensive outpatient services at discharge thus delaying his return to work.  We discussed with patient another lithium level tomorrow morning after which time home-going medications will be procured for him and his " "wife will come to the facility at 7:00 in the evening and pick him up to take him home.  Social work is coordinating the discharge follow-up appointments.  We also discussed with patient nicotine replacement and patient reports he quit nicotine 'cold turkey' by being admitted and is willing to accept a nicotine replacement prescription at discharge.  We also discussed with the patient the prescriptions for lithium and Zoloft and their doses as well as his medical medications for discharge.  Extensive discussion was held regarding naltrexone and its uses for patient.  There was discussed concern related to endogenous opiates being stifled by the use of naltrexone thus mimicking depression potentially.  Patient will delay resumption of naltrexone and thus when offered it was declined and this will be noted on the after visit summary.  Patient may consider use of naltrexone in the community after discharge with the outpatient provider after showing greater mood stability in the interim.  Remains absent of expressed desire to harm others and continues to remain absent of psychotic and manic features during the admission.       Objective     Last Recorded Vitals  Blood pressure 127/76, pulse 53, temperature 36.6 °C (97.9 °F), temperature source Temporal, resp. rate 16, height 1.753 m (5' 9\"), weight 78.1 kg (172 lb 3.2 oz), SpO2 98%.    Sleep Log  Estimated \"Sleeping\" Durations   Night Est. Hours   07/27 11.00-12.00   07/28 10.00-10.25   07/29 10.50-11.25   07/30 10.75-11.75   07/31 11.00-11.75   08/01 13.75-14.00   08/02 10.50-11.25   08/03 10.50-10.75   08/04 9.25-10.50   08/05 12.00-12.75        Review of Systems    Psychiatric ROS - Adult  Anxiety: Negative  Depression: mild anhedonia   Delirium: negative  Psychosis: negative  Pearl: negative  Safety Issues: Denies SI/ AVH  Psychiatric ROS Comment: n/a    Physical Exam  Constitutional:       Appearance: Normal appearance.   HENT:      Head: Normocephalic and " "atraumatic.     Eyes:      Comments: Conjunctiva non-erythematous, non-icteric   Pulmonary:      Comments: Without increased respiratory effort    Neurological:      General: No focal deficit present.      Mental Status: He is alert.      Comments: Seen to be ambulating appropriately on unit     Mental Status Examination  General Appearance: standing, beard, tattoo on inner right arm.   Gait/Station: ambulating independently without assistance required  Speech: conversational volume, overall with minimal spontaneous production  Mood: \"better than last week\" feels \"more motivated\"  Affect: less Constricted and  increasingly reactive, congruent with mood.   Thought Process: Linear, goal directed  Thought Content: mood congruent, focusing on his boredom on the unit minimally contributed content related to gains thus far until directly asked; denies thoughts of harming self, others when asked  Perception: No perceptual abnormalities noted  Level of Consciousness: Alert  Orientation: Alert and oriented to person, place, time and situation as evidenced by being aware of his hospitalization, time of day, and self. Understood he will not be discharged today and possibly tomorrow  Attention and Concentration: appears to be intact due to ability to have full conversation without need to redirect patient  Insight: Fair, in regards to understanding mental health condition; Feels that his condition is manageable given the adherence to treatment plan  Judgment: Fair, expresses agreement with the plan and openness to the process of hospitalization; Understands the continuity of his diagnosis and willingness to continue medications past his discharge       Psychiatric Risk Assessment  Violence Risk Assessment: acces to weapons, major mental illness, male,  history or weapons training, presence of firearms in home, substance abuse, and unemployment  Acute Risk of Harm to Others is Considered: low   Suicide Risk Assessment: " access to weapons, , current psychiatric illness, feelings of hopelessness, history of trauma or abuse, male, presence of firearms in home, severe anxiety, substance abuse, and suicidal ideations  Protective Factors against Suicide: adherence to  treatment, child-related concerns/living with children at home < 18 yrs, marriage/partnership, and positive family relationships  Acute Risk of Harm to Self is Considered: moderate    Relevant Results  Results for orders placed or performed during the hospital encounter of 07/27/25 (from the past 96 hours)   Lithium level   Result Value Ref Range    Lithium 0.61 0.60 - 1.20 mmol/L           Assessment & Plan  Pure hypercholesterolemia    Hypertension    Tobacco use disorder    Severe depressed bipolar I disorder without psychotic features (Multi)    Bipolar depression f31.4     Roman Ellis is a 50 y.o. male with a past medical history of recently diagnosed bipolar disorder, past history of anxiety, depression, and substance use disorder here for SI and worsening depressive symptoms.      He has has multiple recent admissions complicated by drug and alcohol use, however most recently with a diagnosis of bipolar disorder. Per patient, he has been feeling increasingly depressed over the past month, much worse over the past two weeks. It is likely that his current clinical picture is an acute depressive episode in the context of his bipolar disorder. He will require medication adjustments to best address his current clinical picture. He otherwise denies current suicidality, but exhibited concerning symptoms for suicidality outpatient and remains at moderate to high risk of suicide outpatient without additional treatment. He requires inpatient admission for medication adjustments and close clinical monitoring.      SBIRT - Patient was screened for substances of abuse and had a positive screen.  Brief intervention then held with patient discussing the impact of  substance abuse/use disorder on bio/psycho/social functioning and patient's readiness for change.  On discharge, patient will be referred to treatment for substance use disorder.     8/1- patient reports that he is feeling better today. He states that he is still bored, but seeing progress. Subjective mood improvement reported.  Objective findings not yet tracking however. Patient is participating in groups and is seen walking around the unit.    8/4- patient reports he is feeling more hopeful and slightly less bored. He is optimistic of progress. Patient is more engaged in groups and seen walking around the unit.    8/5- limited change from day prior.  Affect remains constricted and evidence is able to mobilize.  Family meeting tomorrow, lithium level Thursday morning and presently planned for then discharge later in the day Thursday.    8/6- Affect less constricted with further improvement. Family meeting today lithium level Thursday morning and presently planned for then discharge later in the day Thursday.      Plan  Biological -      - continue zoloft 75 mg daily for depression - further titration as tolerated, beneficial, to 100 mg at discharge  - continue lithium 900 mg ER, titrate as tolerated for mood disorder              Monitoring for lithium:              - Lithium level on er 7500 = 0.61, on Lithium er 600 mg = 0.56, next level Thursday AM (ordered)  - renal function, thyroid function within normal limits, reviewed EKG of may 19 2025 - no conduction anomaly noted  -consider addition of seroquel once lithium at steady state as mood adjunct     Literature supports lithium combination therapy - utilizing ssri as combo agent in lieu of antipsychotic seroquel but may add seroquel adjunctively depending on response to lithium + zoloft.  Avoiding previously used snri effexor to minimize potential for switching.  Consider ECT - not available in this location.        2. Psychological -      Patient is  encouraged to participate with the therapeutic milieu and program and group therapy        3. Sociological -      Patient encouraged to cooperate with social work staff on issues relevant to discharge planning  Family meeting Wednesday afternoon     4. Patient discussion -      Plan discussed with patient who concurs with the assessment, treatment plan, and with the following informed consent for medication:   The benefits, risks, most common side effects, possible consequences of not taking the medication, and alternatives to the recommended treatment has been discussed at length.                          KARENA MOHAN

## 2025-08-06 NOTE — GROUP NOTE
Group Topic: Music Therapy   Group Date: 8/6/2025  Start Time: 1100  End Time: 1200  Facilitators: Amy Garcia   Department: [facility]    Number of Participants: 11   Group Focus: expressive outlet, feeling awareness/expression, and self-esteem/task mastery  Treatment Modality: Music Therapy  Interventions Utilized were: active music engagement, empathic listening/validating emotions, exploration, and passive music engagement    Pts were invited to share their musical background, interests, and given the choice of whether they'd like to make group music or have an introspective intervention. Pts decided together to sing and play songs as a group with uplifting and resilient messages.      Name: Roman Ellis YOB: 1975   MR: [id number]      Level of Participation: active  Quality of Participation: attentive and engaged  Interactions with others: appropriate  Mood/Affect: flat  Cognition, Pre Treatment: attentive  Cognition, Post Treatment: attentive  Progress: Moderate  Plan: continue with services    Pt offered lyrics for songwriting intervention and showed camaraderie with peers throughout session.

## 2025-08-06 NOTE — GROUP NOTE
Group Topic: Reminiscence   Group Date: 8/6/2025  Start Time: 1330  End Time: 1430  Facilitators: BOSSMAN Robledo   Department: [facility]    Number of Participants: 8   Group Focus: other Let's Talk  Treatment Modality: Other: Recreation Therapy  Interventions utilized were exploration, group exercise, reminiscence, and story telling  Purpose: other: fun, elevate mood, increase socialization, enhance self esteem    Name: Roman Ellis YOB: 1975   MR: [id number]      Facilitator: Recreational Therapist  Level of Participation: active  Quality of Participation: appropriate/pleasant, attentive, and cooperative  Interactions with others: appropriate and gave feedback  Mood/Affect: appropriate  Triggers (if applicable): n/a  Cognition: coherent/clear  Progress: Significant  Comments: pt problem is depressed mood.  Pt displays appropriate and active participation in group unit family meeting.  Plan: continue with services

## 2025-08-07 ENCOUNTER — PHARMACY VISIT (OUTPATIENT)
Dept: PHARMACY | Facility: CLINIC | Age: 50
End: 2025-08-07
Payer: COMMERCIAL

## 2025-08-07 VITALS
HEART RATE: 63 BPM | WEIGHT: 172.2 LBS | SYSTOLIC BLOOD PRESSURE: 114 MMHG | RESPIRATION RATE: 17 BRPM | TEMPERATURE: 97.7 F | HEIGHT: 69 IN | OXYGEN SATURATION: 99 % | BODY MASS INDEX: 25.51 KG/M2 | DIASTOLIC BLOOD PRESSURE: 79 MMHG

## 2025-08-07 LAB — LITHIUM SERPL-SCNC: 0.68 MMOL/L (ref 0.6–1.2)

## 2025-08-07 PROCEDURE — 2500000002 HC RX 250 W HCPCS SELF ADMINISTERED DRUGS (ALT 637 FOR MEDICARE OP, ALT 636 FOR OP/ED): Performed by: PSYCHIATRY & NEUROLOGY

## 2025-08-07 PROCEDURE — 2500000001 HC RX 250 WO HCPCS SELF ADMINISTERED DRUGS (ALT 637 FOR MEDICARE OP): Performed by: INTERNAL MEDICINE

## 2025-08-07 PROCEDURE — 36415 COLL VENOUS BLD VENIPUNCTURE: CPT | Performed by: PSYCHIATRY & NEUROLOGY

## 2025-08-07 PROCEDURE — 80178 ASSAY OF LITHIUM: CPT | Performed by: PSYCHIATRY & NEUROLOGY

## 2025-08-07 PROCEDURE — RXMED WILLOW AMBULATORY MEDICATION CHARGE

## 2025-08-07 PROCEDURE — 99239 HOSP IP/OBS DSCHRG MGMT >30: CPT | Performed by: PSYCHIATRY & NEUROLOGY

## 2025-08-07 PROCEDURE — 2500000002 HC RX 250 W HCPCS SELF ADMINISTERED DRUGS (ALT 637 FOR MEDICARE OP, ALT 636 FOR OP/ED): Performed by: INTERNAL MEDICINE

## 2025-08-07 RX ORDER — MICONAZOLE NITRATE 2 %
2 CREAM (GRAM) TOPICAL EVERY 2 HOUR PRN
Qty: 100 EACH | Refills: 0 | Status: SHIPPED | OUTPATIENT
Start: 2025-08-07 | End: 2025-08-15 | Stop reason: ALTCHOICE

## 2025-08-07 RX ORDER — AMLODIPINE BESYLATE 5 MG/1
5 TABLET ORAL DAILY
Qty: 30 TABLET | Refills: 0 | Status: SHIPPED | OUTPATIENT
Start: 2025-08-07 | End: 2025-08-15 | Stop reason: SDUPTHER

## 2025-08-07 RX ORDER — MULTIVIT-MIN/IRON FUM/FOLIC AC 7.5 MG-4
1 TABLET ORAL DAILY
Qty: 130 TABLET | Refills: 0 | Status: SHIPPED | OUTPATIENT
Start: 2025-08-07

## 2025-08-07 RX ORDER — TAMSULOSIN HYDROCHLORIDE 0.4 MG/1
0.4 CAPSULE ORAL DAILY
Qty: 30 CAPSULE | Refills: 0 | Status: SHIPPED | OUTPATIENT
Start: 2025-08-07 | End: 2025-08-15 | Stop reason: SDUPTHER

## 2025-08-07 RX ORDER — SERTRALINE HYDROCHLORIDE 100 MG/1
100 TABLET, FILM COATED ORAL DAILY
Qty: 30 TABLET | Refills: 0 | Status: SHIPPED | OUTPATIENT
Start: 2025-08-07 | End: 2025-08-15 | Stop reason: ALTCHOICE

## 2025-08-07 RX ORDER — FOLIC ACID 1 MG/1
1 TABLET ORAL DAILY
Qty: 30 TABLET | Refills: 0 | Status: SHIPPED | OUTPATIENT
Start: 2025-08-07 | End: 2025-08-15 | Stop reason: ALTCHOICE

## 2025-08-07 RX ORDER — LITHIUM CARBONATE 450 MG/1
900 TABLET ORAL NIGHTLY
Qty: 60 TABLET | Refills: 0 | Status: SHIPPED | OUTPATIENT
Start: 2025-08-07

## 2025-08-07 RX ADMIN — AMLODIPINE BESYLATE 5 MG: 5 TABLET ORAL at 08:11

## 2025-08-07 RX ADMIN — FOLIC ACID 1 MG: 1 TABLET ORAL at 08:11

## 2025-08-07 RX ADMIN — TAMSULOSIN HYDROCHLORIDE 0.4 MG: 0.4 CAPSULE ORAL at 08:11

## 2025-08-07 RX ADMIN — SERTRALINE 75 MG: 50 TABLET, FILM COATED ORAL at 08:11

## 2025-08-07 ASSESSMENT — PAIN - FUNCTIONAL ASSESSMENT: PAIN_FUNCTIONAL_ASSESSMENT: 0-10

## 2025-08-07 ASSESSMENT — PAIN SCALES - GENERAL: PAINLEVEL_OUTOF10: 0 - NO PAIN

## 2025-08-07 NOTE — GROUP NOTE
Group Topic: Symptom Management   Group Date: 8/7/2025  Start Time: 1030  End Time: 1130  Facilitators: PHILLIP FinneyS   Department: [facility]    Number of Participants: 12   Group Focus: coping skills, feeling awareness/expression, personal responsibility, and self-awareness  Treatment Modality: Other: Recreation Therapy, Education  Interventions utilized were exploration, patient education, and support  Purpose: Patients engaged in a group session aimed to increase knowledge focused on stressors/triggers, warning signs/symptoms and personal responsibilities. Pts also encouraged to create a coping skills toolbox for those times when in a distressed situation/s. This group also aims to help foster use of prosocial behaviors and become aware of and handle negative behaviors in a more effective manner.     Name: Roman Ellis YOB: 1975   MR: [id number]      Facilitator: Recreational Therapist  Level of Participation: active  Quality of Participation: appropriate/pleasant, attentive, cooperative, and engaged  Interactions with others: appropriate  Mood/Affect: appropriate  Cognition: coherent/clear  Progress: Significant  Comments: engages in group discussion. Appropriate and insightful responses. Displays good attention to tasks.   Plan: continue with services

## 2025-08-07 NOTE — DISCHARGE SUMMARY
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 substance abuse/use disorder on bio/psycho/social functioning and patient's readiness for change.  On discharge, patient is referred to treatment for substance use disorder. Patient offered resumption of FDA approved naltrexone and declined to resume on discharge, and he will confer with outpatient team after discharge about continued use (held on discharge med rec as such).  Nicotine dispensed as replacement for tobacco use at discharge.  There is no FDA approved medication indicated for treatment of cannabis use to offer patient at discharge and thus none offered.    Discharge Admission Goal Reconciliation    Admission goals met during stay include reconciliation of medications, treatment of target symptoms, gathering of collateral supportive of discharge, and linking with appropriate level of care in the community.      At discharge, patient encouraged to participate in activity as tolerated, go to all follow up appointments, take all medications as directed, outreach social supports, and utilize crisis safety resources when appropriate.      Risk Assessment at Discharge  Psychiatric:  Patient's psychiatric condition is of lower risk than on admission given the accumulated and maintained gains as described herein.  Functioning now closer to if not at baseline, and patient is further able to  identify appropriate and positive coping skills to manage further or recurrent symptoms.      Physical:  Patients physical condition at discharge is reasonable given ability to complete ADLs.      Social:  Social functioning is improved with patient identifying protective factors of family while also demonstrating increased social interactions on the unit and finally demonstrating appropriate problem solving skills for attending aftercare appointments.    Labs related to admission:  Component      Latest Ref Rng 7/26/2025 7/28/2025   WBC      4.4 - 11.3 x10*3/uL 11.0     nRBC      0.0 - 0.0 /100 WBCs 0.0     RBC      4.50 - 5.90 x10*6/uL 5.28     HEMOGLOBIN      13.5 - 17.5 g/dL 16.0     HEMATOCRIT      41.0 - 52.0 % 46.9     MCV      80 - 100 fL 89     MCH      26.0 - 34.0 pg 30.3     MCHC      32.0 - 36.0 g/dL 34.1     RED CELL DISTRIBUTION WIDTH      11.5 - 14.5 % 12.4     Platelets      150 - 450 x10*3/uL 279     Neutrophils %      40.0 - 80.0 % 75.7     Immature Granulocytes %, Automated      0.0 - 0.9 % 0.3     Lymphocytes %      13.0 - 44.0 % 14.2     Monocytes %      2.0 - 10.0 % 7.4     Eosinophils %      0.0 - 6.0 % 1.9     Basophils %      0.0 - 2.0 % 0.5     Neutrophils Absolute      1.20 - 7.70 x10*3/uL 8.34 (H)     Immature Granulocytes Absolute, Automated      0.00 - 0.70 x10*3/uL 0.03     Lymphocytes Absolute      1.20 - 4.80 x10*3/uL 1.56     Monocytes Absolute      0.10 - 1.00 x10*3/uL 0.81     Eosinophils Absolute      0.00 - 0.70 x10*3/uL 0.21     Basophils Absolute      0.00 - 0.10 x10*3/uL 0.06     GLUCOSE      74 - 99 mg/dL 109 (H)     SODIUM      136 - 145 mmol/L 136     POTASSIUM      3.5 - 5.3 mmol/L 4.2     CHLORIDE      98 - 107 mmol/L 101     Bicarbonate      21 - 32 mmol/L 27     Anion Gap      10 - 20 mmol/L 12     Blood Urea Nitrogen      6 - 23 mg/dL 15     Creatinine      0.50 - 1.30 mg/dL 1.06     EGFR      >60 mL/min/1.73m*2 85     Calcium      8.6 - 10.3 mg/dL 9.6     Albumin       3.4 - 5.0 g/dL 4.5     Alkaline Phosphatase      33 - 120 U/L 39     Total Protein      6.4 - 8.2 g/dL 7.4     AST      9 - 39 U/L 12     Bilirubin Total      0.0 - 1.2 mg/dL 0.6     ALT      10 - 52 U/L 17     Amphetamine Screen, Urine      Presumptive Negative  Presumptive Negative     Barbiturate Screen, Urine      Presumptive Negative  Presumptive Negative     Benzodiazepines Screen, Urine      Presumptive Negative  Presumptive Negative     Cannabinoid Screen, Urine      Presumptive Negative  Presumptive Negative     Cocaine Metabolite Screen, Urine      Presumptive Negative  Presumptive Negative     Fentanyl Screen, Urine      Presumptive Negative  Presumptive Negative     Opiate Screen, Urine      Presumptive Negative  Presumptive Negative     Oxycodone Screen, Urine      Presumptive Negative  Presumptive Negative     PCP Screen, Urine      Presumptive Negative  Presumptive Negative     Methadone Screen, Urine      Presumptive Negative  Presumptive Negative     CHOLESTEROL      0 - 199 mg/dL  164    HDL CHOLESTEROL      mg/dL  36.1    Cholesterol/HDL Ratio  4.5    LDL Calculated      <=99 mg/dL  109 (H)    VLDL      0 - 40 mg/dL  19    TRIGLYCERIDES      0 - 149 mg/dL  93    Non HDL Cholesterol      0 - 149 mg/dL  128    Acetaminophen      10.0 - 30.0 ug/mL <10.0     Salicylate       4 - 20 mg/dL <3     Alcohol      <=10 mg/dL <10     Glucose, Fasting      74 - 99 mg/dL  122 (H)    Thyroid Stimulating Hormone      0.44 - 3.98 mIU/L     Lithium      0.60 - 1.20 mmol/L       Component      Latest Ref Rn 7/29/2025 7/31/2025 8/4/2025   WBC      4.4 - 11.3 x10*3/uL      nRBC      0.0 - 0.0 /100 WBCs      RBC      4.50 - 5.90 x10*6/uL      HEMOGLOBIN      13.5 - 17.5 g/dL      HEMATOCRIT      41.0 - 52.0 %      MCV      80 - 100 fL      MCH      26.0 - 34.0 pg      MCHC      32.0 - 36.0 g/dL      RED CELL DISTRIBUTION WIDTH      11.5 - 14.5 %      Platelets      150 - 450 x10*3/uL      Neutrophils %      40.0 -  80.0 %      Immature Granulocytes %, Automated      0.0 - 0.9 %      Lymphocytes %      13.0 - 44.0 %      Monocytes %      2.0 - 10.0 %      Eosinophils %      0.0 - 6.0 %      Basophils %      0.0 - 2.0 %      Neutrophils Absolute      1.20 - 7.70 x10*3/uL      Immature Granulocytes Absolute, Automated      0.00 - 0.70 x10*3/uL      Lymphocytes Absolute      1.20 - 4.80 x10*3/uL      Monocytes Absolute      0.10 - 1.00 x10*3/uL      Eosinophils Absolute      0.00 - 0.70 x10*3/uL      Basophils Absolute      0.00 - 0.10 x10*3/uL      GLUCOSE      74 - 99 mg/dL      SODIUM      136 - 145 mmol/L      POTASSIUM      3.5 - 5.3 mmol/L      CHLORIDE      98 - 107 mmol/L      Bicarbonate      21 - 32 mmol/L      Anion Gap      10 - 20 mmol/L      Blood Urea Nitrogen      6 - 23 mg/dL      Creatinine      0.50 - 1.30 mg/dL      EGFR      >60 mL/min/1.73m*2      Calcium      8.6 - 10.3 mg/dL      Albumin      3.4 - 5.0 g/dL      Alkaline Phosphatase      33 - 120 U/L      Total Protein      6.4 - 8.2 g/dL      AST      9 - 39 U/L      Bilirubin Total      0.0 - 1.2 mg/dL      ALT      10 - 52 U/L      Amphetamine Screen, Urine      Presumptive Negative       Barbiturate Screen, Urine      Presumptive Negative       Benzodiazepines Screen, Urine      Presumptive Negative       Cannabinoid Screen, Urine      Presumptive Negative       Cocaine Metabolite Screen, Urine      Presumptive Negative       Fentanyl Screen, Urine      Presumptive Negative       Opiate Screen, Urine      Presumptive Negative       Oxycodone Screen, Urine      Presumptive Negative       PCP Screen, Urine      Presumptive Negative       Methadone Screen, Urine      Presumptive Negative       CHOLESTEROL      0 - 199 mg/dL      HDL CHOLESTEROL      mg/dL      Cholesterol/HDL Ratio      LDL Calculated      <=99 mg/dL      VLDL      0 - 40 mg/dL      TRIGLYCERIDES      0 - 149 mg/dL      Non HDL Cholesterol      0 - 149 mg/dL      Acetaminophen      10.0 -  30.0 ug/mL      Salicylate       4 - 20 mg/dL      Alcohol      <=10 mg/dL      Glucose, Fasting      74 - 99 mg/dL      Thyroid Stimulating Hormone      0.44 - 3.98 mIU/L 0.65      Lithium      0.60 - 1.20 mmol/L  0.56 (L)  0.61      Component      Latest Ref [facility] 8/7/2025   WBC      4.4 - 11.3 x10*3/uL    nRBC      0.0 - 0.0 /100 WBCs    RBC      4.50 - 5.90 x10*6/uL    HEMOGLOBIN      13.5 - 17.5 g/dL    HEMATOCRIT      41.0 - 52.0 %    MCV      80 - 100 fL    MCH      26.0 - 34.0 pg    MCHC      32.0 - 36.0 g/dL    RED CELL DISTRIBUTION WIDTH      11.5 - 14.5 %    Platelets      150 - 450 x10*3/uL    Neutrophils %      40.0 - 80.0 %    Immature Granulocytes %, Automated      0.0 - 0.9 %    Lymphocytes %      13.0 - 44.0 %    Monocytes %      2.0 - 10.0 %    Eosinophils %      0.0 - 6.0 %    Basophils %      0.0 - 2.0 %    Neutrophils Absolute      1.20 - 7.70 x10*3/uL    Immature Granulocytes Absolute, Automated      0.00 - 0.70 x10*3/uL    Lymphocytes Absolute      1.20 - 4.80 x10*3/uL    Monocytes Absolute      0.10 - 1.00 x10*3/uL    Eosinophils Absolute      0.00 - 0.70 x10*3/uL    Basophils Absolute      0.00 - 0.10 x10*3/uL    GLUCOSE      74 - 99 mg/dL    SODIUM      136 - 145 mmol/L    POTASSIUM      3.5 - 5.3 mmol/L    CHLORIDE      98 - 107 mmol/L    Bicarbonate      21 - 32 mmol/L    Anion Gap      10 - 20 mmol/L    Blood Urea Nitrogen      6 - 23 mg/dL    Creatinine      0.50 - 1.30 mg/dL    EGFR      >60 mL/min/1.73m*2    Calcium      8.6 - 10.3 mg/dL    Albumin      3.4 - 5.0 g/dL    Alkaline Phosphatase      33 - 120 U/L    Total Protein      6.4 - 8.2 g/dL    AST      9 - 39 U/L    Bilirubin Total      0.0 - 1.2 mg/dL    ALT      10 - 52 U/L    Amphetamine Screen, Urine      Presumptive Negative     Barbiturate Screen, Urine      Presumptive Negative     Benzodiazepines Screen, Urine      Presumptive Negative     Cannabinoid Screen, Urine      Presumptive Negative     Cocaine Metabolite Screen,  Urine      Presumptive Negative     Fentanyl Screen, Urine      Presumptive Negative     Opiate Screen, Urine      Presumptive Negative     Oxycodone Screen, Urine      Presumptive Negative     PCP Screen, Urine      Presumptive Negative     Methadone Screen, Urine      Presumptive Negative     CHOLESTEROL      0 - 199 mg/dL    HDL CHOLESTEROL      mg/dL    Cholesterol/HDL Ratio    LDL Calculated      <=99 mg/dL    VLDL      0 - 40 mg/dL    TRIGLYCERIDES      0 - 149 mg/dL    Non HDL Cholesterol      0 - 149 mg/dL    Acetaminophen      10.0 - 30.0 ug/mL    Salicylate       4 - 20 mg/dL    Alcohol      <=10 mg/dL    Glucose, Fasting      74 - 99 mg/dL    Thyroid Stimulating Hormone      0.44 - 3.98 mIU/L    Lithium      0.60 - 1.20 mmol/L 0.68       Legend:  (H) High  (L) Low    Scheduled medications    naltrexone 50 mg tablet  Wait to take this until your doctor or other care provider tells you to start again.  Commonly known as: Depade  Take 1 tablet (50 mg) by mouth once daily at bedtime. Wait to take this until your doctor or other care provider tells you to start again.     TAKE these medications  TAKE these medications    Morning Around Noon Evening Bedtime As Needed    amLODIPine 5 mg tablet  Commonly known as: Norvasc  Take 1 tablet (5 mg) by mouth once daily.  Last time this was given: August 7, 2025  8:11 AM 1 tablet        Century  mg-mcg tablet  Take 1 tablet by mouth once daily.  Generic drug: multivitamin with minerals 1 tablet        folic acid 1 mg tablet  Commonly known as: Folvite  Take 1 tablet (1 mg) by mouth once daily.  Last time this was given: August 7, 2025  8:11 AM 1 tablet        lithium  mg 12 hr tablet  Commonly known as: Eskalith  Take 2 tablets (900 mg) by mouth once daily at bedtime. Do not crush, chew, or split.  Last time this was given: August 6, 2025  8:52 PM    2 tablets     nicotine polacrilex 2 mg gum  Commonly known as: Nicorette  Chew 1 each (2 mg) every 2 hours if  "needed for smoking cessation (nicotine craving, urge to smoke).     1 each    sertraline 100 mg tablet  Commonly known as: Zoloft  Take 1 tablet (100 mg) by mouth once daily.  Last time this was given: August 7, 2025  8:11 AM 1 tablet        tamsulosin 0.4 mg 24 hr capsule  Commonly known as: Flomax  Take 1 capsule (0.4 mg) by mouth once daily. Do not crush, chew, or split.  Last time this was given: August 7, 2025  8:11 AM  What changed: additional instructions 1 capsule                    Pertinent Physical Exam At Time of Discharge  Physical Exam    Mental Status Exam  General Appearance: standing, beard, tattoo on inner right arm.   Gait/Station: ambulating independently without assistance required  Speech: conversational volume, overall with minimal spontaneous production  Mood: \"better than last week\" feels \"more motivated\"  Affect: less Constricted and  increasingly reactive, congruent with mood.   Thought Process: Linear, goal directed  Thought Content: mood congruent, focusing on his boredom on the unit minimally contributed content related to gains thus far until directly asked; denies thoughts of harming self, others when asked  Perception: No perceptual abnormalities noted  Level of Consciousness: Alert  Orientation: Alert and oriented to person, place, time and situation as evidenced by being aware of his hospitalization, time of day, and self. Understood he will not be discharged today and possibly tomorrow  Attention and Concentration: appears to be intact due to ability to have full conversation without need to redirect patient  Insight: Fair, in regards to understanding mental health condition; Feels that his condition is manageable given the adherence to treatment plan  Judgment: Fair, expresses agreement with the plan and openness to the process of hospitalization; Understands the continuity of his diagnosis and willingness to continue medications past his discharge              Outpatient " Appointments/Follow-Ups  Future Appointments   Date Time Provider Department Center   8/13/2025  1:00 PM Shey Gil [facility] [id number] [facility]   8/15/2025  8:30 AM Antoine Zarate DO [id number] [facility]   8/22/2025  9:30 AM Roverto Thornton MD [id number] [facility]   12/19/2025  8:00 AM DO DARCY [id number] [facility]     Behavioral Health Assessment  [address] [place] [id number] [phone]  Fax [phone]  Go on 8/12/2025  Complete intake assessment at 3:30pm in person to establish services, such for Individual Counseling, for ongoing assistance and support of mental health and substance needs. Please bring ID, Insurance Card, proof of residency such as piece of mail, and proof of household income such as tax return.    IOP (Intensive Outpatient Programming)  [facility]  [phone]  Go on 8/13/2025  Complete Intake Assessment at 1pm virtually, to establish services for additional assistance and support of mental health and substance use needs. Please complete intake questionaire and paperwork sent by email prior to this appointment.    Tobacco Cessation  [facility]   [phone]  Call on 8/8/2025  Follow up at 11am for assistance and support.    AA (Alcoholics Anonymous)  www.aa.org  or   www.54.org  Go to  Resume meetings weekly. For information and where to find additional meetings in your area, visit the provided websites.    Medication Management  Dr. Roverto Thornton   [facility]   [address]   [address]   [facility] [address] [phone]  Go on 8/22/2025  Complete initial appointment with psychiatric provider at 9:30am virtually, regarding ongoing Medication Managment for assistance and support of mental health and substance needs.          Parts of this chart have been completed using voice recognition software.  Please excuse any errors of transcription.  Despite the medical decision making time stamp, my medical decision making has taken place  during the patient's entire visit.  Thought process and reason for plan has been formulated from the time that I saw the patient until the time of disposition and is not specific to one specific moment during their visit and furthermore the medical decision making encompasses the entire chart and not only that represented in this note.       I personally spent 35 minutes today providing care for this patient, including multidisciplinary team discussion, preparation, face to face time, documentation and other services such as review of medical records, diagnostic result, patient education, counseling, coordination of care as specified in the encounter.        Jim Martinez, DO

## 2025-08-07 NOTE — GROUP NOTE
Group Topic: Goals   Group Date: 8/7/2025  Start Time: 0730  End Time: 0800  Facilitators: Denisse Castillo   Department: [facility]     Number of Participants: 12   Group Focus: daily focus and goals  Treatment Modality: Other: Daily goal setting  Interventions utilized were assignment  Purpose: other: Daily goal setting    Name: Roman Ellis YOB: 1975   MR: [id number]      Facilitator: [facility]  Level of Participation: minimal  Quality of Participation: appropriate/pleasant, attentive, cooperative, and engaged  Interactions with others: appropriate  Mood/Affect: appropriate  Cognition: coherent/clear  Progress: Minimal  Comments: Patient problem is bipolar. Patient attended group and stated his only goal was to go home today.  Plan: continue with services

## 2025-08-07 NOTE — GROUP NOTE
Group Topic: Goals   Group Date: 8/6/2025  Start Time: 2000  End Time: 2030  Facilitators: Jaycee Albright   Department: [facility]     Number of Participants: 12   Group Focus: goals  Treatment Modality: Individual Therapy and Patient-Centered Therapy  Interventions utilized were problem solving  Purpose: communication skills, self-worth, and self-care    Name: Roman Ellis YOB: 1975   MR: [id number]      Facilitator: [facility]  Level of Participation: did not attend  Quality of Participation: did not attend  Interactions with others: did not attend  Mood/Affect: did not attend  Triggers (if applicable): did not attend  Cognition: did not attend  Progress: did not attend  Comments: did not attend  Plan: continue with services

## 2025-08-07 NOTE — NURSING NOTE
"Discharge Nursing Note    Discharge date: 8/7/25  Discharge time: 1915    Discharged to:  home    Transportation provided by:  wife    Responsible person notified of discharge/transfer?  Include name of person if answering \"YES\"  No    Patient left with all belongings:  Yes    Patient left with discharge medication list:  Yes    Patient left with discharge instructions:  Yes    AVS/Continuing Care Plan discussed with patient and copy given to either patient or handed to medical transport for discharges to facilities:  Yes    Other concerns at discharge:  No concerns, went over discharge paperwork and medications with patient and wife.    Presentation on discharge:  Pt reports his mood is better and he denies suicidal ideation.   "

## 2025-08-07 NOTE — GROUP NOTE
Group Topic: Other   Group Date: 8/7/2025  Start Time: 1330  End Time: 1430  Facilitators: PHILLIP FinneyS   Department: [facility] VIRT[facility]    Number of Participants: 11   Group Focus: leisure skills, reminiscence, self-esteem, and social skills  Treatment Modality: Leisure Development and Other: Recreation Therapy  Interventions utilized were group exercise, leisure development, mental fitness, and reminiscence  Purpose: In this Recreation Therapy group of “Trivia” patients were prompted to engage together in a game which aims to promote increased socialization, motivation, activity level, following directions, concentration along with utilizing mental fitness    Name: Roman Ellis YOB: 1975   MR: [id number]      Facilitator: Recreational Therapist  Level of Participation: active  Quality of Participation: appropriate/pleasant, attentive, cooperative, and engaged  Interactions with others: appropriate  Mood/Affect: appropriate and brightens with interaction  Cognition: coherent/clear  Progress: Significant  Comments: pt engages well with peers. Displays good attention to tasks with appropriate mood and behaviors.   Plan: continue with services

## 2025-08-08 ENCOUNTER — PATIENT OUTREACH (OUTPATIENT)
Dept: CARE COORDINATION | Facility: CLINIC | Age: 50
End: 2025-08-08
Payer: COMMERCIAL

## 2025-08-08 SDOH — ECONOMIC STABILITY: GENERAL: WOULD YOU LIKE HELP WITH ANY OF THE FOLLOWING NEEDS?: I DONT NEED HELP WITH ANY OF THESE

## 2025-08-08 SDOH — ECONOMIC STABILITY: FOOD INSECURITY
ARE ANY OF YOUR NEEDS URGENT? FOR EXAMPLE, UNCERTAINTY OF WHERE YOU WILL GET YOUR NEXT MEAL OR NOT HAVING THE MEDICATIONS YOU NEED TO TAKE TOMORROW.: NO

## 2025-08-08 NOTE — PROGRESS NOTES
Outreach call to Pt to assist with a smooth transition from most recent admission.   SW spoke to patient, reviewed discharge, meds, assessed social needs, and provided education about the importance of follow up apts with providers. Pt is connected to [facility] services at this time. Pt is stable at this time. No other needs discussed.     .Medications  Medications reviewed with patient/caregiver?: (S) Yes (Pt has meds from discharge) (8/8/2025  7:58 AM)  Is the patient having any side effects they believe may be caused by any medication additions or changes?: No (8/8/2025  7:58 AM)  Does the patient have all medications ordered at discharge?: Not applicable (8/8/2025  7:58 AM)  Care Management Interventions: No intervention needed (8/8/2025  7:58 AM)  Is the patient taking all medications as directed (includes completed medication regime)?: Yes (8/8/2025  7:58 AM)    Appointments  Does the patient have a primary care provider?: -- (Pt is working with [facility] from  Services) (8/8/2025  7:58 AM)    Patient Teaching  Does the patient have access to their discharge instructions?: Yes (8/8/2025  7:58 AM)  Care Management Interventions: (S) Reviewed instructions with patient (Pt has discharge paperwork) (8/8/2025  7:58 AM)  What is the patient's perception of their health status since discharge?: Improving (8/8/2025  7:58 AM)  Is the patient/caregiver able to teach back the hierarchy of who to call/visit for symptoms/problems? PCP, Specialist, Home Health nurse, Urgent Care, ED, 911: Yes (8/8/2025  7:58 AM)      [facility]   [phone]

## 2025-08-09 LAB
ATRIAL RATE: 47 BPM
P AXIS: 28 DEGREES
P OFFSET: 181 MS
P ONSET: 139 MS
PR INTERVAL: 152 MS
Q ONSET: 215 MS
QRS COUNT: 8 BEATS
QRS DURATION: 80 MS
QT INTERVAL: 444 MS
QTC CALCULATION(BAZETT): 392 MS
QTC FREDERICIA: 409 MS
R AXIS: 72 DEGREES
T AXIS: 56 DEGREES
T OFFSET: 437 MS
VENTRICULAR RATE: 47 BPM

## 2025-08-13 ENCOUNTER — TELEMEDICINE (OUTPATIENT)
Dept: BEHAVIORAL HEALTH | Facility: CLINIC | Age: 50
End: 2025-08-13
Payer: COMMERCIAL

## 2025-08-13 DIAGNOSIS — F41.9 ANXIETY: ICD-10-CM

## 2025-08-13 DIAGNOSIS — F19.21: ICD-10-CM

## 2025-08-13 DIAGNOSIS — F32.1 CURRENT MODERATE EPISODE OF MAJOR DEPRESSIVE DISORDER WITHOUT PRIOR EPISODE (MULTI): Primary | ICD-10-CM

## 2025-08-13 PROCEDURE — 90791 PSYCH DIAGNOSTIC EVALUATION: CPT | Mod: U2,95

## 2025-08-14 ENCOUNTER — TELEMEDICINE (OUTPATIENT)
Dept: BEHAVIORAL HEALTH | Facility: CLINIC | Age: 50
End: 2025-08-14
Payer: COMMERCIAL

## 2025-08-14 DIAGNOSIS — F31.9 BIPOLAR 1 DISORDER (MULTI): ICD-10-CM

## 2025-08-14 PROCEDURE — 99215 OFFICE O/P EST HI 40 MIN: CPT

## 2025-08-15 ENCOUNTER — APPOINTMENT (OUTPATIENT)
Dept: PRIMARY CARE | Facility: CLINIC | Age: 50
End: 2025-08-15
Payer: COMMERCIAL

## 2025-08-15 VITALS
HEIGHT: 69 IN | HEART RATE: 95 BPM | DIASTOLIC BLOOD PRESSURE: 80 MMHG | SYSTOLIC BLOOD PRESSURE: 130 MMHG | BODY MASS INDEX: 25.33 KG/M2 | WEIGHT: 171 LBS | OXYGEN SATURATION: 98 %

## 2025-08-15 DIAGNOSIS — N40.0 BENIGN PROSTATIC HYPERPLASIA WITHOUT LOWER URINARY TRACT SYMPTOMS: ICD-10-CM

## 2025-08-15 DIAGNOSIS — N52.9 ERECTILE DYSFUNCTION, UNSPECIFIED ERECTILE DYSFUNCTION TYPE: Primary | ICD-10-CM

## 2025-08-15 DIAGNOSIS — G47.33 OBSTRUCTIVE SLEEP APNEA: ICD-10-CM

## 2025-08-15 DIAGNOSIS — I10 PRIMARY HYPERTENSION: ICD-10-CM

## 2025-08-15 PROCEDURE — 3079F DIAST BP 80-89 MM HG: CPT | Performed by: STUDENT IN AN ORGANIZED HEALTH CARE EDUCATION/TRAINING PROGRAM

## 2025-08-15 PROCEDURE — 99214 OFFICE O/P EST MOD 30 MIN: CPT | Performed by: STUDENT IN AN ORGANIZED HEALTH CARE EDUCATION/TRAINING PROGRAM

## 2025-08-15 PROCEDURE — 3075F SYST BP GE 130 - 139MM HG: CPT | Performed by: STUDENT IN AN ORGANIZED HEALTH CARE EDUCATION/TRAINING PROGRAM

## 2025-08-15 PROCEDURE — 3008F BODY MASS INDEX DOCD: CPT | Performed by: STUDENT IN AN ORGANIZED HEALTH CARE EDUCATION/TRAINING PROGRAM

## 2025-08-15 RX ORDER — TAMSULOSIN HYDROCHLORIDE 0.4 MG/1
0.4 CAPSULE ORAL DAILY
Qty: 90 CAPSULE | Refills: 1 | Status: SHIPPED | OUTPATIENT
Start: 2025-08-15

## 2025-08-15 RX ORDER — AMLODIPINE BESYLATE 5 MG/1
5 TABLET ORAL DAILY
Qty: 90 TABLET | Refills: 1 | Status: SHIPPED | OUTPATIENT
Start: 2025-08-15

## 2025-08-15 RX ORDER — TADALAFIL 5 MG/1
5 TABLET ORAL DAILY
Qty: 30 TABLET | Refills: 11 | Status: SHIPPED | OUTPATIENT
Start: 2025-08-15 | End: 2026-08-15

## 2025-08-15 ASSESSMENT — PAIN SCALES - GENERAL: PAINLEVEL_OUTOF10: 0-NO PAIN

## 2025-08-15 ASSESSMENT — ENCOUNTER SYMPTOMS
NERVOUS/ANXIOUS: 0
DYSPHORIC MOOD: 1

## 2025-08-15 ASSESSMENT — COLUMBIA-SUICIDE SEVERITY RATING SCALE - C-SSRS: 1. IN THE PAST MONTH, HAVE YOU WISHED YOU WERE DEAD OR WISHED YOU COULD GO TO SLEEP AND NOT WAKE UP?: NO

## 2025-08-18 ENCOUNTER — CLINICAL SUPPORT (OUTPATIENT)
Dept: BEHAVIORAL HEALTH | Facility: CLINIC | Age: 50
End: 2025-08-18
Payer: COMMERCIAL

## 2025-08-18 DIAGNOSIS — F19.21: ICD-10-CM

## 2025-08-18 DIAGNOSIS — F32.1 CURRENT MODERATE EPISODE OF MAJOR DEPRESSIVE DISORDER WITHOUT PRIOR EPISODE (MULTI): ICD-10-CM

## 2025-08-18 DIAGNOSIS — F41.9 ANXIETY: ICD-10-CM

## 2025-08-18 PROCEDURE — 90853 GROUP PSYCHOTHERAPY: CPT | Mod: U2,95

## 2025-08-19 ENCOUNTER — CLINICAL SUPPORT (OUTPATIENT)
Dept: BEHAVIORAL HEALTH | Facility: CLINIC | Age: 50
End: 2025-08-19
Payer: COMMERCIAL

## 2025-08-19 DIAGNOSIS — F41.9 ANXIETY: ICD-10-CM

## 2025-08-19 DIAGNOSIS — F19.21: ICD-10-CM

## 2025-08-19 DIAGNOSIS — F32.1 CURRENT MODERATE EPISODE OF MAJOR DEPRESSIVE DISORDER WITHOUT PRIOR EPISODE (MULTI): Primary | ICD-10-CM

## 2025-08-19 PROCEDURE — 90853 GROUP PSYCHOTHERAPY: CPT | Mod: U2,95

## 2025-08-20 ENCOUNTER — CLINICAL SUPPORT (OUTPATIENT)
Dept: BEHAVIORAL HEALTH | Facility: CLINIC | Age: 50
End: 2025-08-20
Payer: COMMERCIAL

## 2025-08-20 ENCOUNTER — DOCUMENTATION (OUTPATIENT)
Dept: BEHAVIORAL HEALTH | Facility: CLINIC | Age: 50
End: 2025-08-20
Payer: COMMERCIAL

## 2025-08-20 DIAGNOSIS — F41.9 ANXIETY: ICD-10-CM

## 2025-08-20 DIAGNOSIS — F32.1 CURRENT MODERATE EPISODE OF MAJOR DEPRESSIVE DISORDER WITHOUT PRIOR EPISODE (MULTI): Primary | ICD-10-CM

## 2025-08-20 DIAGNOSIS — F19.21: ICD-10-CM

## 2025-08-20 PROCEDURE — 90853 GROUP PSYCHOTHERAPY: CPT | Mod: U2,95

## 2025-08-21 ENCOUNTER — CLINICAL SUPPORT (OUTPATIENT)
Dept: BEHAVIORAL HEALTH | Facility: CLINIC | Age: 50
End: 2025-08-21
Payer: COMMERCIAL

## 2025-08-21 ENCOUNTER — DOCUMENTATION (OUTPATIENT)
Dept: BEHAVIORAL HEALTH | Facility: CLINIC | Age: 50
End: 2025-08-21
Payer: COMMERCIAL

## 2025-08-21 DIAGNOSIS — F19.21: ICD-10-CM

## 2025-08-21 DIAGNOSIS — F41.9 ANXIETY: ICD-10-CM

## 2025-08-21 DIAGNOSIS — F32.1 CURRENT MODERATE EPISODE OF MAJOR DEPRESSIVE DISORDER WITHOUT PRIOR EPISODE (MULTI): Primary | ICD-10-CM

## 2025-08-21 PROCEDURE — 90853 GROUP PSYCHOTHERAPY: CPT | Mod: U2,95

## 2025-08-22 ENCOUNTER — APPOINTMENT (OUTPATIENT)
Dept: BEHAVIORAL HEALTH | Facility: CLINIC | Age: 50
End: 2025-08-22
Payer: COMMERCIAL

## 2025-08-25 ENCOUNTER — CLINICAL SUPPORT (OUTPATIENT)
Dept: BEHAVIORAL HEALTH | Facility: CLINIC | Age: 50
End: 2025-08-25
Payer: COMMERCIAL

## 2025-08-25 DIAGNOSIS — F19.21: ICD-10-CM

## 2025-08-25 DIAGNOSIS — F41.9 ANXIETY: ICD-10-CM

## 2025-08-25 DIAGNOSIS — F32.1 CURRENT MODERATE EPISODE OF MAJOR DEPRESSIVE DISORDER WITHOUT PRIOR EPISODE (MULTI): ICD-10-CM

## 2025-08-25 PROCEDURE — 90853 GROUP PSYCHOTHERAPY: CPT | Mod: U2,95

## 2025-08-26 ENCOUNTER — CLINICAL SUPPORT (OUTPATIENT)
Dept: BEHAVIORAL HEALTH | Facility: CLINIC | Age: 50
End: 2025-08-26
Payer: COMMERCIAL

## 2025-08-26 DIAGNOSIS — F41.9 ANXIETY: ICD-10-CM

## 2025-08-26 DIAGNOSIS — F32.1 CURRENT MODERATE EPISODE OF MAJOR DEPRESSIVE DISORDER WITHOUT PRIOR EPISODE (MULTI): ICD-10-CM

## 2025-08-26 DIAGNOSIS — F19.21: ICD-10-CM

## 2025-08-26 PROCEDURE — 90853 GROUP PSYCHOTHERAPY: CPT | Mod: U2,95

## 2025-08-27 ENCOUNTER — CLINICAL SUPPORT (OUTPATIENT)
Dept: BEHAVIORAL HEALTH | Facility: CLINIC | Age: 50
End: 2025-08-27
Payer: COMMERCIAL

## 2025-08-27 DIAGNOSIS — F41.9 ANXIETY: ICD-10-CM

## 2025-08-27 DIAGNOSIS — F19.21: ICD-10-CM

## 2025-08-27 DIAGNOSIS — F32.1 CURRENT MODERATE EPISODE OF MAJOR DEPRESSIVE DISORDER WITHOUT PRIOR EPISODE (MULTI): Primary | ICD-10-CM

## 2025-08-27 PROCEDURE — 90853 GROUP PSYCHOTHERAPY: CPT | Mod: U2,95

## 2025-08-28 ENCOUNTER — APPOINTMENT (OUTPATIENT)
Dept: BEHAVIORAL HEALTH | Facility: CLINIC | Age: 50
End: 2025-08-28
Payer: COMMERCIAL

## 2025-08-29 ENCOUNTER — DOCUMENTATION (OUTPATIENT)
Dept: BEHAVIORAL HEALTH | Facility: CLINIC | Age: 50
End: 2025-08-29
Payer: COMMERCIAL

## 2025-09-02 ENCOUNTER — CLINICAL SUPPORT (OUTPATIENT)
Dept: BEHAVIORAL HEALTH | Facility: CLINIC | Age: 50
End: 2025-09-02
Payer: COMMERCIAL

## 2025-09-02 DIAGNOSIS — F32.1 CURRENT MODERATE EPISODE OF MAJOR DEPRESSIVE DISORDER WITHOUT PRIOR EPISODE (MULTI): ICD-10-CM

## 2025-09-02 DIAGNOSIS — F19.21: ICD-10-CM

## 2025-09-02 DIAGNOSIS — F41.9 ANXIETY: ICD-10-CM

## 2025-09-02 PROCEDURE — 90853 GROUP PSYCHOTHERAPY: CPT | Mod: U2,95

## 2025-09-03 ENCOUNTER — CLINICAL SUPPORT (OUTPATIENT)
Dept: BEHAVIORAL HEALTH | Facility: CLINIC | Age: 50
End: 2025-09-03
Payer: COMMERCIAL

## 2025-09-03 DIAGNOSIS — F32.1 CURRENT MODERATE EPISODE OF MAJOR DEPRESSIVE DISORDER WITHOUT PRIOR EPISODE (MULTI): ICD-10-CM

## 2025-09-03 DIAGNOSIS — F19.21: ICD-10-CM

## 2025-09-03 DIAGNOSIS — F41.9 ANXIETY: ICD-10-CM

## 2025-09-03 PROCEDURE — 90853 GROUP PSYCHOTHERAPY: CPT | Mod: U2,95

## 2025-09-04 ENCOUNTER — DOCUMENTATION (OUTPATIENT)
Dept: BEHAVIORAL HEALTH | Facility: CLINIC | Age: 50
End: 2025-09-04
Payer: COMMERCIAL

## 2025-09-04 ENCOUNTER — CLINICAL SUPPORT (OUTPATIENT)
Dept: BEHAVIORAL HEALTH | Facility: CLINIC | Age: 50
End: 2025-09-04
Payer: COMMERCIAL

## 2025-09-04 DIAGNOSIS — F32.1 CURRENT MODERATE EPISODE OF MAJOR DEPRESSIVE DISORDER WITHOUT PRIOR EPISODE (MULTI): Primary | ICD-10-CM

## 2025-09-04 DIAGNOSIS — F19.21: ICD-10-CM

## 2025-09-04 DIAGNOSIS — F41.9 ANXIETY: ICD-10-CM

## 2025-09-04 PROCEDURE — 90853 GROUP PSYCHOTHERAPY: CPT | Mod: U2,95

## 2025-09-05 ENCOUNTER — APPOINTMENT (OUTPATIENT)
Dept: BEHAVIORAL HEALTH | Facility: CLINIC | Age: 50
End: 2025-09-05
Payer: COMMERCIAL

## 2025-10-03 ENCOUNTER — APPOINTMENT (OUTPATIENT)
Dept: BEHAVIORAL HEALTH | Facility: CLINIC | Age: 50
End: 2025-10-03
Payer: COMMERCIAL

## 2025-12-19 ENCOUNTER — APPOINTMENT (OUTPATIENT)
Dept: PRIMARY CARE | Facility: CLINIC | Age: 50
End: 2025-12-19
Payer: COMMERCIAL